# Patient Record
Sex: FEMALE | Race: BLACK OR AFRICAN AMERICAN | Employment: OTHER | URBAN - METROPOLITAN AREA
[De-identification: names, ages, dates, MRNs, and addresses within clinical notes are randomized per-mention and may not be internally consistent; named-entity substitution may affect disease eponyms.]

---

## 2018-12-20 ENCOUNTER — ANESTHESIA EVENT (OUTPATIENT)
Dept: SURGERY | Age: 53
DRG: 253 | End: 2018-12-20
Payer: MEDICARE

## 2018-12-20 ENCOUNTER — HOSPITAL ENCOUNTER (OUTPATIENT)
Dept: PREADMISSION TESTING | Age: 53
Discharge: HOME | DRG: 253 | End: 2018-12-20
Payer: MEDICARE

## 2018-12-20 ENCOUNTER — HOSPITAL ENCOUNTER (INPATIENT)
Age: 53
LOS: 12 days | Discharge: HOME HEALTH | DRG: 253 | End: 2019-01-01
Attending: EMERGENCY MEDICINE | Admitting: FAMILY MEDICINE
Payer: MEDICARE

## 2018-12-20 DIAGNOSIS — Z01.818 PREOP TESTING: ICD-10-CM

## 2018-12-20 DIAGNOSIS — T82.898A OCCLUSION OF FEMOROPOPLITEAL BYPASS GRAFT, INITIAL ENCOUNTER (HCC): Primary | ICD-10-CM

## 2018-12-20 PROBLEM — F32.A DEPRESSION: Chronic | Status: ACTIVE | Noted: 2018-12-20

## 2018-12-20 PROBLEM — I25.10 CAD (CORONARY ARTERY DISEASE): Chronic | Status: ACTIVE | Noted: 2018-12-20

## 2018-12-20 PROBLEM — I70.219 ATHEROSCLEROTIC PVD WITH INTERMITTENT CLAUDICATION (HCC): Status: ACTIVE | Noted: 2018-12-20

## 2018-12-20 PROBLEM — F17.200 CURRENT SMOKER: Status: ACTIVE | Noted: 2018-12-20

## 2018-12-20 PROBLEM — E11.9 DM (DIABETES MELLITUS) (HCC): Chronic | Status: ACTIVE | Noted: 2018-12-20

## 2018-12-20 PROBLEM — E87.6 HYPOKALEMIA: Status: ACTIVE | Noted: 2018-12-20

## 2018-12-20 PROBLEM — I10 HTN (HYPERTENSION): Chronic | Status: ACTIVE | Noted: 2018-12-20

## 2018-12-20 LAB
ALBUMIN SERPL-MCNC: 3.4 G/DL (ref 3.4–5)
ALBUMIN/GLOB SERPL: 0.7 {RATIO} (ref 0.8–1.7)
ALP SERPL-CCNC: 66 U/L (ref 45–117)
ALT SERPL-CCNC: 26 U/L (ref 13–56)
ANION GAP SERPL CALC-SCNC: 5 MMOL/L (ref 3–18)
ANION GAP SERPL CALC-SCNC: 8 MMOL/L (ref 3–18)
APTT PPP: 57.3 SEC (ref 23–36.4)
AST SERPL-CCNC: 37 U/L (ref 15–37)
BASOPHILS # BLD: 0 K/UL (ref 0–0.1)
BASOPHILS NFR BLD: 0 % (ref 0–2)
BILIRUB SERPL-MCNC: 0.8 MG/DL (ref 0.2–1)
BUN SERPL-MCNC: 10 MG/DL (ref 7–18)
BUN SERPL-MCNC: 10 MG/DL (ref 7–18)
BUN/CREAT SERPL: 13 (ref 12–20)
BUN/CREAT SERPL: 14 (ref 12–20)
CALCIUM SERPL-MCNC: 8.3 MG/DL (ref 8.5–10.1)
CALCIUM SERPL-MCNC: 8.4 MG/DL (ref 8.5–10.1)
CHLORIDE SERPL-SCNC: 101 MMOL/L (ref 100–108)
CHLORIDE SERPL-SCNC: 99 MMOL/L (ref 100–108)
CO2 SERPL-SCNC: 28 MMOL/L (ref 21–32)
CO2 SERPL-SCNC: 30 MMOL/L (ref 21–32)
CREAT SERPL-MCNC: 0.73 MG/DL (ref 0.6–1.3)
CREAT SERPL-MCNC: 0.75 MG/DL (ref 0.6–1.3)
DIFFERENTIAL METHOD BLD: ABNORMAL
EOSINOPHIL # BLD: 0.1 K/UL (ref 0–0.4)
EOSINOPHIL NFR BLD: 2 % (ref 0–5)
ERYTHROCYTE [DISTWIDTH] IN BLOOD BY AUTOMATED COUNT: 14.5 % (ref 11.6–14.5)
GLOBULIN SER CALC-MCNC: 5 G/DL (ref 2–4)
GLUCOSE BLD STRIP.AUTO-MCNC: 159 MG/DL (ref 70–110)
GLUCOSE SERPL-MCNC: 87 MG/DL (ref 74–99)
GLUCOSE SERPL-MCNC: 91 MG/DL (ref 74–99)
HCT VFR BLD AUTO: 37.9 % (ref 35–45)
HCT VFR BLD AUTO: 38.1 % (ref 35–45)
HGB BLD-MCNC: 11.7 G/DL (ref 12–16)
HGB BLD-MCNC: 11.7 G/DL (ref 12–16)
INR PPP: 1 (ref 0.8–1.2)
LYMPHOCYTES # BLD: 1.5 K/UL (ref 0.9–3.6)
LYMPHOCYTES NFR BLD: 24 % (ref 21–52)
MCH RBC QN AUTO: 21.4 PG (ref 24–34)
MCHC RBC AUTO-ENTMCNC: 30.9 G/DL (ref 31–37)
MCV RBC AUTO: 69.3 FL (ref 74–97)
MONOCYTES # BLD: 0.5 K/UL (ref 0.05–1.2)
MONOCYTES NFR BLD: 9 % (ref 3–10)
NEUTS SEG # BLD: 4 K/UL (ref 1.8–8)
NEUTS SEG NFR BLD: 65 % (ref 40–73)
PLATELET # BLD AUTO: 198 K/UL (ref 135–420)
PMV BLD AUTO: 11.2 FL (ref 9.2–11.8)
POTASSIUM SERPL-SCNC: 3.2 MMOL/L (ref 3.5–5.5)
POTASSIUM SERPL-SCNC: 3.4 MMOL/L (ref 3.5–5.5)
PROT SERPL-MCNC: 8.4 G/DL (ref 6.4–8.2)
PROTHROMBIN TIME: 13.2 SEC (ref 11.5–15.2)
RBC # BLD AUTO: 5.47 M/UL (ref 4.2–5.3)
SODIUM SERPL-SCNC: 135 MMOL/L (ref 136–145)
SODIUM SERPL-SCNC: 136 MMOL/L (ref 136–145)
WBC # BLD AUTO: 6.1 K/UL (ref 4.6–13.2)

## 2018-12-20 PROCEDURE — 85014 HEMATOCRIT: CPT

## 2018-12-20 PROCEDURE — 86923 COMPATIBILITY TEST ELECTRIC: CPT

## 2018-12-20 PROCEDURE — 74011250636 HC RX REV CODE- 250/636: Performed by: EMERGENCY MEDICINE

## 2018-12-20 PROCEDURE — 96365 THER/PROPH/DIAG IV INF INIT: CPT

## 2018-12-20 PROCEDURE — 74011250637 HC RX REV CODE- 250/637: Performed by: EMERGENCY MEDICINE

## 2018-12-20 PROCEDURE — 74011250637 HC RX REV CODE- 250/637: Performed by: FAMILY MEDICINE

## 2018-12-20 PROCEDURE — 65270000029 HC RM PRIVATE

## 2018-12-20 PROCEDURE — 74011250636 HC RX REV CODE- 250/636: Performed by: FAMILY MEDICINE

## 2018-12-20 PROCEDURE — 80053 COMPREHEN METABOLIC PANEL: CPT

## 2018-12-20 PROCEDURE — 36415 COLL VENOUS BLD VENIPUNCTURE: CPT

## 2018-12-20 PROCEDURE — 96375 TX/PRO/DX INJ NEW DRUG ADDON: CPT

## 2018-12-20 PROCEDURE — 74011636637 HC RX REV CODE- 636/637: Performed by: NURSE ANESTHETIST, CERTIFIED REGISTERED

## 2018-12-20 PROCEDURE — 82962 GLUCOSE BLOOD TEST: CPT

## 2018-12-20 PROCEDURE — 74011250637 HC RX REV CODE- 250/637: Performed by: NURSE ANESTHETIST, CERTIFIED REGISTERED

## 2018-12-20 PROCEDURE — 85025 COMPLETE CBC W/AUTO DIFF WBC: CPT

## 2018-12-20 PROCEDURE — 86901 BLOOD TYPING SEROLOGIC RH(D): CPT

## 2018-12-20 PROCEDURE — 85610 PROTHROMBIN TIME: CPT

## 2018-12-20 PROCEDURE — 99285 EMERGENCY DEPT VISIT HI MDM: CPT

## 2018-12-20 PROCEDURE — 85730 THROMBOPLASTIN TIME PARTIAL: CPT

## 2018-12-20 PROCEDURE — 77030021352 HC CBL LD SYS DISP COVD -B

## 2018-12-20 RX ORDER — HEPARIN SODIUM 10000 [USP'U]/100ML
18-36 INJECTION, SOLUTION INTRAVENOUS
Status: DISCONTINUED | OUTPATIENT
Start: 2018-12-20 | End: 2018-12-21

## 2018-12-20 RX ORDER — NITROGLYCERIN 0.4 MG/1
0.4 TABLET SUBLINGUAL
COMMUNITY
Start: 2016-12-09 | End: 2019-09-24 | Stop reason: SDUPTHER

## 2018-12-20 RX ORDER — HEPARIN SODIUM 1000 [USP'U]/ML
40 INJECTION, SOLUTION INTRAVENOUS; SUBCUTANEOUS ONCE
Status: COMPLETED | OUTPATIENT
Start: 2018-12-20 | End: 2018-12-20

## 2018-12-20 RX ORDER — SODIUM CHLORIDE, SODIUM LACTATE, POTASSIUM CHLORIDE, CALCIUM CHLORIDE 600; 310; 30; 20 MG/100ML; MG/100ML; MG/100ML; MG/100ML
50 INJECTION, SOLUTION INTRAVENOUS CONTINUOUS
Status: DISCONTINUED | OUTPATIENT
Start: 2018-12-20 | End: 2018-12-21 | Stop reason: HOSPADM

## 2018-12-20 RX ORDER — NALOXONE HYDROCHLORIDE 0.4 MG/ML
0.4 INJECTION, SOLUTION INTRAMUSCULAR; INTRAVENOUS; SUBCUTANEOUS AS NEEDED
Status: DISCONTINUED | OUTPATIENT
Start: 2018-12-20 | End: 2019-01-01 | Stop reason: HOSPADM

## 2018-12-20 RX ORDER — ALBUTEROL SULFATE 90 UG/1
2 AEROSOL, METERED RESPIRATORY (INHALATION)
Status: DISCONTINUED | OUTPATIENT
Start: 2018-12-20 | End: 2018-12-20 | Stop reason: CLARIF

## 2018-12-20 RX ORDER — CLONAZEPAM 0.5 MG/1
1 TABLET ORAL 2 TIMES DAILY
Status: DISCONTINUED | OUTPATIENT
Start: 2018-12-20 | End: 2018-12-26

## 2018-12-20 RX ORDER — DEXTROSE MONOHYDRATE 25 G/50ML
25-50 INJECTION, SOLUTION INTRAVENOUS AS NEEDED
Status: DISCONTINUED | OUTPATIENT
Start: 2018-12-20 | End: 2019-01-01 | Stop reason: HOSPADM

## 2018-12-20 RX ORDER — AMLODIPINE BESYLATE 2.5 MG/1
2.5 TABLET ORAL DAILY
COMMUNITY
Start: 2017-04-22

## 2018-12-20 RX ORDER — ATORVASTATIN CALCIUM 20 MG/1
80 TABLET, FILM COATED ORAL DAILY
COMMUNITY
End: 2019-08-06

## 2018-12-20 RX ORDER — LOSARTAN POTASSIUM 50 MG/1
50 TABLET ORAL DAILY
Status: DISCONTINUED | OUTPATIENT
Start: 2018-12-21 | End: 2018-12-25

## 2018-12-20 RX ORDER — SODIUM CHLORIDE AND POTASSIUM CHLORIDE .9; .15 G/100ML; G/100ML
SOLUTION INTRAVENOUS CONTINUOUS
Status: DISCONTINUED | OUTPATIENT
Start: 2018-12-20 | End: 2018-12-22

## 2018-12-20 RX ORDER — ACETAMINOPHEN 500 MG
1000 TABLET ORAL ONCE
Status: COMPLETED | OUTPATIENT
Start: 2018-12-20 | End: 2018-12-20

## 2018-12-20 RX ORDER — LINAGLIPTIN 5 MG/1
5 TABLET, FILM COATED ORAL DAILY
COMMUNITY
Start: 2017-03-08

## 2018-12-20 RX ORDER — POTASSIUM CHLORIDE 20 MEQ/1
40 TABLET, EXTENDED RELEASE ORAL DAILY
Status: DISCONTINUED | OUTPATIENT
Start: 2018-12-20 | End: 2019-01-01 | Stop reason: HOSPADM

## 2018-12-20 RX ORDER — GUAIFENESIN 100 MG/5ML
81 LIQUID (ML) ORAL DAILY
Status: DISCONTINUED | OUTPATIENT
Start: 2018-12-21 | End: 2018-12-22

## 2018-12-20 RX ORDER — MORPHINE SULFATE 4 MG/ML
4 INJECTION INTRAVENOUS
Status: DISCONTINUED | OUTPATIENT
Start: 2018-12-20 | End: 2018-12-21 | Stop reason: CLARIF

## 2018-12-20 RX ORDER — GABAPENTIN 300 MG/1
300 CAPSULE ORAL 3 TIMES DAILY
Status: DISCONTINUED | OUTPATIENT
Start: 2018-12-20 | End: 2018-12-26

## 2018-12-20 RX ORDER — CLOPIDOGREL BISULFATE 75 MG/1
75 TABLET ORAL DAILY
Status: DISCONTINUED | OUTPATIENT
Start: 2018-12-21 | End: 2019-01-01 | Stop reason: HOSPADM

## 2018-12-20 RX ORDER — MORPHINE SULFATE 10 MG/ML
4 INJECTION, SOLUTION INTRAMUSCULAR; INTRAVENOUS
Status: DISCONTINUED | OUTPATIENT
Start: 2018-12-20 | End: 2018-12-20

## 2018-12-20 RX ORDER — CLONAZEPAM 1 MG/1
1 TABLET ORAL 2 TIMES DAILY
COMMUNITY
End: 2020-06-24 | Stop reason: ALTCHOICE

## 2018-12-20 RX ORDER — AMLODIPINE BESYLATE 5 MG/1
2.5 TABLET ORAL DAILY
Status: DISCONTINUED | OUTPATIENT
Start: 2018-12-21 | End: 2018-12-26

## 2018-12-20 RX ORDER — ALBUTEROL SULFATE 0.83 MG/ML
2.5 SOLUTION RESPIRATORY (INHALATION)
Status: DISCONTINUED | OUTPATIENT
Start: 2018-12-20 | End: 2019-01-01 | Stop reason: HOSPADM

## 2018-12-20 RX ORDER — HEPARIN SODIUM 1000 [USP'U]/ML
80 INJECTION, SOLUTION INTRAVENOUS; SUBCUTANEOUS ONCE
Status: COMPLETED | OUTPATIENT
Start: 2018-12-20 | End: 2018-12-20

## 2018-12-20 RX ORDER — DEXTROSE 50 % IN WATER (D50W) INTRAVENOUS SYRINGE
25-50 AS NEEDED
Status: DISCONTINUED | OUTPATIENT
Start: 2018-12-20 | End: 2018-12-20

## 2018-12-20 RX ORDER — GUAIFENESIN 100 MG/5ML
81 LIQUID (ML) ORAL DAILY
COMMUNITY
Start: 2016-07-02 | End: 2021-07-29

## 2018-12-20 RX ORDER — CEFAZOLIN SODIUM 2 G/50ML
2 SOLUTION INTRAVENOUS
Status: COMPLETED | OUTPATIENT
Start: 2018-12-21 | End: 2018-12-21

## 2018-12-20 RX ORDER — INSULIN LISPRO 100 [IU]/ML
INJECTION, SOLUTION INTRAVENOUS; SUBCUTANEOUS ONCE
Status: COMPLETED | OUTPATIENT
Start: 2018-12-20 | End: 2018-12-20

## 2018-12-20 RX ORDER — INSULIN LISPRO 100 [IU]/ML
INJECTION, SOLUTION INTRAVENOUS; SUBCUTANEOUS EVERY 6 HOURS
Status: DISCONTINUED | OUTPATIENT
Start: 2018-12-20 | End: 2018-12-23

## 2018-12-20 RX ORDER — ATORVASTATIN CALCIUM 20 MG/1
80 TABLET, FILM COATED ORAL DAILY
Status: DISCONTINUED | OUTPATIENT
Start: 2018-12-21 | End: 2019-01-01 | Stop reason: HOSPADM

## 2018-12-20 RX ORDER — LOSARTAN POTASSIUM 50 MG/1
TABLET ORAL DAILY
COMMUNITY
End: 2019-09-24 | Stop reason: SDUPTHER

## 2018-12-20 RX ORDER — QUETIAPINE 300 MG/1
300 TABLET, FILM COATED, EXTENDED RELEASE ORAL
Status: DISCONTINUED | OUTPATIENT
Start: 2018-12-20 | End: 2019-01-01 | Stop reason: HOSPADM

## 2018-12-20 RX ORDER — FAMOTIDINE 20 MG/1
20 TABLET, FILM COATED ORAL ONCE
Status: COMPLETED | OUTPATIENT
Start: 2018-12-20 | End: 2018-12-20

## 2018-12-20 RX ORDER — CLOPIDOGREL BISULFATE 75 MG/1
75 TABLET ORAL
COMMUNITY
Start: 2016-12-09 | End: 2019-01-01

## 2018-12-20 RX ORDER — KETOROLAC TROMETHAMINE 30 MG/ML
30 INJECTION, SOLUTION INTRAMUSCULAR; INTRAVENOUS ONCE
Status: COMPLETED | OUTPATIENT
Start: 2018-12-20 | End: 2018-12-20

## 2018-12-20 RX ORDER — GABAPENTIN 300 MG/1
300 CAPSULE ORAL 3 TIMES DAILY
COMMUNITY
End: 2020-06-24 | Stop reason: ALTCHOICE

## 2018-12-20 RX ORDER — OXYCODONE AND ACETAMINOPHEN 5; 325 MG/1; MG/1
1-2 TABLET ORAL
Status: DISCONTINUED | OUTPATIENT
Start: 2018-12-20 | End: 2019-01-01 | Stop reason: HOSPADM

## 2018-12-20 RX ORDER — MORPHINE SULFATE 2 MG/ML
4 INJECTION, SOLUTION INTRAMUSCULAR; INTRAVENOUS
Status: COMPLETED | OUTPATIENT
Start: 2018-12-20 | End: 2018-12-20

## 2018-12-20 RX ORDER — MAGNESIUM SULFATE 100 %
4 CRYSTALS MISCELLANEOUS AS NEEDED
Status: DISCONTINUED | OUTPATIENT
Start: 2018-12-20 | End: 2019-01-01 | Stop reason: HOSPADM

## 2018-12-20 RX ADMIN — ACETAMINOPHEN 1000 MG: 500 TABLET, FILM COATED ORAL at 16:39

## 2018-12-20 RX ADMIN — POTASSIUM CHLORIDE 40 MEQ: 20 TABLET, EXTENDED RELEASE ORAL at 18:01

## 2018-12-20 RX ADMIN — CLONAZEPAM 1 MG: 0.5 TABLET ORAL at 20:59

## 2018-12-20 RX ADMIN — QUETIAPINE FUMARATE 300 MG: 300 TABLET, EXTENDED RELEASE ORAL at 23:02

## 2018-12-20 RX ADMIN — GABAPENTIN 300 MG: 300 CAPSULE ORAL at 23:02

## 2018-12-20 RX ADMIN — INSULIN LISPRO 3 UNITS: 100 INJECTION, SOLUTION INTRAVENOUS; SUBCUTANEOUS at 18:35

## 2018-12-20 RX ADMIN — HEPARIN SODIUM 5980 UNITS: 1000 INJECTION INTRAVENOUS; SUBCUTANEOUS at 15:37

## 2018-12-20 RX ADMIN — SODIUM CHLORIDE AND POTASSIUM CHLORIDE: .9; .15 SOLUTION INTRAVENOUS at 18:01

## 2018-12-20 RX ADMIN — FAMOTIDINE 20 MG: 20 TABLET ORAL at 18:01

## 2018-12-20 RX ADMIN — HEPARIN SODIUM 2990 UNITS: 1000 INJECTION, SOLUTION INTRAVENOUS; SUBCUTANEOUS at 23:03

## 2018-12-20 RX ADMIN — KETOROLAC TROMETHAMINE 30 MG: 30 INJECTION, SOLUTION INTRAMUSCULAR at 16:39

## 2018-12-20 RX ADMIN — MORPHINE SULFATE 4 MG: 2 INJECTION, SOLUTION INTRAMUSCULAR; INTRAVENOUS at 16:39

## 2018-12-20 RX ADMIN — HEPARIN SODIUM AND DEXTROSE 18 UNITS/KG/HR: 10000; 5 INJECTION INTRAVENOUS at 15:38

## 2018-12-20 NOTE — H&P
History and Physical    Patient: Veronika Lance               Sex: female          DOA: 2018       YOB: 1965      Age:  48 y.o.        LOS:  LOS: 0 days        Chief Complaint   Patient presents with    Abnormal Lab Results         HPI:     Veronika Lance is a 48 y.o. female  With pmhx CAD, Cardiac stent x 2, DM, pvd, depression , hld , TIA who presents with leg pain onset 2 month ago. Patient has failed fempop. She was seen by vascular yesterday and had a balloon angioplasty yesterday. Patient was told to come to hospital to be admitted for femoral popliteal bypass scheduled for tomorrow. In the ER she was started on Heparin drip per vascular surgery recommendation. She also has associated numbness and says her leg feels cold and heavy. She is an active smoker. Claims her last cigarette was 2 days ago. Past Medical History:   Diagnosis Date    Arthritis     Asthma     Depression     Hypertension     Unspecified sleep apnea    . Past Surgical History:   Procedure Laterality Date    HX  SECTION      HX HYSTERECTOMY      HX ORTHOPAEDIC      \"plate in R arm\"    HX SHOULDER ARTHROSCOPY Left        No current facility-administered medications on file prior to encounter. Current Outpatient Medications on File Prior to Encounter   Medication Sig Dispense Refill    gabapentin (NEURONTIN) 300 mg capsule Take 300 mg by mouth three (3) times daily.  buPROPion (WELLBUTRIN) 100 mg tablet Take  by mouth.  losartan (COZAAR) 50 mg tablet Take  by mouth daily.  atorvastatin (LIPITOR) 20 mg tablet Take  by mouth daily.  aspirin 81 mg chewable tablet 81 mg.      clopidogrel (PLAVIX) 75 mg tab 75 mg.      linagliptin (TRADJENTA) 5 mg tablet 5 mg.  amLODIPine (NORVASC) 2.5 mg tablet 2.5 mg.      nitroglycerin (NITROSTAT) 0.4 mg SL tablet 0.4 mg.      clonazePAM (KLONOPIN) 1 mg tablet 1 mg.       vortioxetine (TRINTELLIX) 10 mg tablet 10 mg.     [facility] QUEtiapine SR (SEROQUEL XR) 300 mg sr tablet Take 300 mg by mouth nightly.  oxycodone-acetaminophen (PERCOCET) 5-325 mg per tablet Take 1-2 tablets by mouth every six (6) hours as needed for Pain. 25 tablet 0    zolpidem (AMBIEN) 10 mg tablet Take 10 mg by mouth nightly as needed.  albuterol (PROVENTIL HFA, VENTOLIN HFA) 90 mcg/actuation inhaler Take  by inhalation. Social History     Socioeconomic History    Marital status: LEGALLY      Spouse name: Not on file    Number of children: Not on file    Years of education: Not on file    Highest education level: Not on file   Social Needs    Financial resource strain: Not on file    Food insecurity - worry: Not on file    Food insecurity - inability: Not on file    Transportation needs - medical: Not on file   [place] needs - non-medical: Not on file   Occupational History    Not on file   Tobacco Use    Smoking status: Current Every Day Smoker     Packs/day: 0.25   Substance and Sexual Activity    Alcohol use: No    Drug use: No    Sexual activity: Not on file   Other Topics Concern    Not on file   Social History Narrative    Not on file         Prior to Admission Medications   Prescriptions Last Dose Informant Patient Reported? Taking? QUEtiapine SR (SEROQUEL XR) 300 mg sr tablet   Yes No   Sig: Take 300 mg by mouth nightly. albuterol (PROVENTIL HFA, VENTOLIN HFA) 90 mcg/actuation inhaler   Yes No   Sig: Take  by inhalation. hydrochlorothiazide (HYDRODIURIL) 25 mg tablet   Yes No   Sig: Take 25 mg by mouth daily. oxycodone-acetaminophen (PERCOCET) 5-325 mg per tablet   No No   Sig: Take 1-2 tablets by mouth every six (6) hours as needed for Pain. venlafaxine-SR (EFFEXOR XR) 37.5 mg capsule   Yes No   Sig: Take 37.5 mg by mouth daily. venlafaxine-SR (EFFEXOR XR) 75 mg capsule   Yes No   Sig: Take 150 mg by mouth daily. zolpidem (AMBIEN) 10 mg tablet   Yes No   Sig: Take 10 mg by mouth nightly as needed. Facility-Administered Medications: None       Family History   Problem Relation Age of Onset    Diabetes Mother     Hypertension Mother     Heart Disease Mother        No Known Allergies    Review of Systems  Review of Systems   Constitutional: Negative. HENT: Negative. Eyes: Negative. Respiratory: Negative. Cardiovascular: Negative. Endocrine: Negative. Genitourinary: Negative. Musculoskeletal:        Left Leg pain    Skin: Negative. Allergic/Immunologic: Negative. Neurological: Positive for numbness (left leg). Hematological: Negative. Psychiatric/Behavioral: Negative. Physical Exam:       Visit Vitals  /83 (BP 1 Location: Left arm, BP Patient Position: At rest)   Pulse 95   Temp 98 °F (36.7 °C)   Resp 16   Ht 5' 5\" (1.651 m)   Wt 74.8 kg (165 lb)   SpO2 96%   BMI 27.46 kg/m²         Physical Exam   Constitutional: She is oriented to person, place, and time. She appears well-developed and well-nourished. No distress. HENT:   Head: Normocephalic and atraumatic. Eyes: EOM are normal. Pupils are equal, round, and reactive to light. No scleral icterus. Neck: Neck supple. Cardiovascular: Normal rate, regular rhythm and normal heart sounds. No murmur heard. Pulmonary/Chest: Breath sounds normal. No respiratory distress. Abdominal: Soft. Bowel sounds are normal. She exhibits no distension. There is no tenderness. Musculoskeletal: She exhibits no edema. Cold , decreased pulse in LLE   Neurological: She is alert and oriented to person, place, and time. Skin: She is not diaphoretic. Relatively cool LLE   Psychiatric: She has a normal mood and affect. Ancillary Studies: All lab and imaging reviewed for the past 24 hours.         Assessment/Plan     Active Problems:    Atherosclerotic PVD with intermittent claudication ([facility] [address] (12/20/2018)      Hypokalemia (12/20/2018)      CAD (coronary artery disease) (12/20/2018)      DM (diabetes mellitus) ([facility] [address] (12/20/2018)      HTN (hypertension) (12/20/2018)      Depression (12/20/2018)      Current smoker (12/20/2018)      Neuropathy     CARE PLAN:    PVD with Claudication   - Continue Heparin drip   - pain control as needed   - Patient scheduled for femoral popliteal bypass tomorrow AM  - Vascular Surgery consulting     CAD  - Plavix  - Losartan   - aspirin   - Lipitor     DM  - SSI   - A1c    HTN  - losartan , Norvasc     Depression   - Wellbutrin , Seroquel, clonepin     Smoker   - advise cessation     Hypokalemia   - replace     DM Neuropathy  - Neurontin     DVT prophylaxis     Full code     Bahman Madrid MD MPH  12/20/2018  4:24 PM

## 2018-12-20 NOTE — ED NOTES
TRANSFER - ED to INPATIENT REPORT:    SBAR report made available to receiving floor on this patient being transferred to [facility] (2100)  for routine progression of care       Admitting diagnosis Atherosclerotic PVD with intermittent claudication ([address]    Information from the following report(s) SBAR, ED Summary, MAR, Recent Results and Med Rec Status was made available to receiving floor. Lines:   Peripheral IV 12/20/18 Right Antecubital (Active)   Site Assessment Clean, dry, & intact 12/20/2018  2:54 PM   Phlebitis Assessment 0 12/20/2018  2:54 PM   Infiltration Assessment 0 12/20/2018  2:54 PM   Dressing Status Clean, dry, & intact 12/20/2018  2:54 PM   Action Taken Blood drawn 12/20/2018  2:54 PM        Medication list confirmed with patient    Opportunity for questions and clarification was provided.       Patient is oriented to time, place, person and situation High alert med  Patient is  continent and ambulatory with assist     Valuables transported with patient     Patient transported with:   Registered Nurse      Vitals w/ MEWS Score (last day)     Date/Time MEWS Score Pulse Resp Temp BP Level of Consciousness SpO2    12/20/18 1452  1  95  16  98 °F (36.7 °C)  121/83  Alert  96 %

## 2018-12-20 NOTE — ED TRIAGE NOTES
Pt sent by Dr. Jim Armenta after failed fem-pop. LLE. Pt to be admitted.  Orders to start Heparin asap

## 2018-12-20 NOTE — ED PROVIDER NOTES
EMERGENCY DEPARTMENT HISTORY AND PHYSICAL EXAM    2:53 PM      Date: 12/20/2018  Patient Name: Axel Pyle    History of Presenting Illness     Chief Complaint   Patient presents with    Abnormal Lab Results         History Provided By: Patient      Additional History (Context): Axel Pyle is a 48 y.o. female with stent placed in the left leg by Dr. Jefferson Mariee (Cardiology) in April 2018 and MI who presents with worsening moderate left leg pain for 2 months. The patient states her pain returned to her left leg and her toes are cramping. The patient had a balloon angioplasty yesterday. The patient is to have a femoral popliteal bypass graft tomorrow by Dr. Gloria Jaimes (Vascular). Dr. Gloria Jaimes wants Heparin started. Currently on Plavix and Aspirin. Patient reports a cough. Denies fevers and chills. Former smoker of 4 days. Denies alcohol and drug use. PCP: Angelica Morris MD    Current Facility-Administered Medications   Medication Dose Route Frequency Provider Last Rate Last Dose    heparin 25,000 units in D5W 250 ml infusion  18-36 Units/kg/hr IntraVENous TITRATE Enrique Ramsey MD 13.5 mL/hr at 12/20/18 1538 18 Units/kg/hr at 12/20/18 1538     Current Outpatient Medications   Medication Sig Dispense Refill    gabapentin (NEURONTIN) 300 mg capsule Take 300 mg by mouth three (3) times daily.  buPROPion (WELLBUTRIN) 100 mg tablet Take  by mouth.  losartan (COZAAR) 50 mg tablet Take  by mouth daily.  atorvastatin (LIPITOR) 20 mg tablet Take  by mouth daily.  aspirin 81 mg chewable tablet 81 mg.      clopidogrel (PLAVIX) 75 mg tab 75 mg.      linagliptin (TRADJENTA) 5 mg tablet 5 mg.  amLODIPine (NORVASC) 2.5 mg tablet 2.5 mg.      nitroglycerin (NITROSTAT) 0.4 mg SL tablet 0.4 mg.      clonazePAM (KLONOPIN) 1 mg tablet 1 mg.  vortioxetine (TRINTELLIX) 10 mg tablet 10 mg.      QUEtiapine SR (SEROQUEL XR) 300 mg sr tablet Take 300 mg by mouth nightly.       oxycodone-acetaminophen (PERCOCET) 5-325 mg per tablet Take 1-2 tablets by mouth every six (6) hours as needed for Pain. 25 tablet 0    zolpidem (AMBIEN) 10 mg tablet Take 10 mg by mouth nightly as needed.  albuterol (PROVENTIL HFA, VENTOLIN HFA) 90 mcg/actuation inhaler Take  by inhalation. Past History     Past Medical History:  Past Medical History:   Diagnosis Date    Arthritis     Asthma     Depression     Hypertension     Unspecified sleep apnea        Past Surgical History:  Past Surgical History:   Procedure Laterality Date    HX  SECTION      HX HYSTERECTOMY      HX ORTHOPAEDIC      \"plate in R arm\"    HX SHOULDER ARTHROSCOPY Left        Family History:  Family History   Problem Relation Age of Onset    Diabetes Mother     Hypertension Mother     Heart Disease Mother        Social History:  Social History     Tobacco Use    Smoking status: Current Every Day Smoker     Packs/day: 0.25   Substance Use Topics    Alcohol use: No    Drug use: No       Allergies:  No Known Allergies      Review of Systems       Review of Systems   Constitutional: Negative for chills and fever. Respiratory: Positive for cough. Musculoskeletal:        Positive for left leg pain. All other systems reviewed and are negative. Physical Exam     Visit Vitals  /88   Pulse 94   Temp 98 °F (36.7 °C)   Resp 24   Ht 5' 5\" (1.651 m)   Wt 74.8 kg (165 lb)   SpO2 94%   BMI 27.46 kg/m²         Physical Exam   Constitutional: She is oriented to person, place, and time. She appears well-developed and well-nourished. HENT:   Head: Normocephalic and atraumatic. Eyes: EOM are normal. Pupils are equal, round, and reactive to light. Right eye exhibits no discharge. Left eye exhibits no discharge. Neck: Normal range of motion. Neck supple. No JVD present. No tracheal deviation present. Cardiovascular: Normal rate, regular rhythm and normal heart sounds. No murmur heard.   Pulses: Radial pulses are 2+ on the right side, and 2+ on the left side. Unable to palpate left DP and PT pulse. Pulmonary/Chest: Effort normal and breath sounds normal. No respiratory distress. She has no rales. Scattered wheezes. Abdominal: Soft. Bowel sounds are normal. She exhibits no distension. There is no tenderness. There is no rebound. Abdomen soft in all four quadrants. Musculoskeletal: Normal range of motion. She exhibits no tenderness or deformity. Left foot warm and well perfused. Pain in all direction of foot and knee. Neurological: She is alert and oriented to person, place, and time. No cranial nerve deficit. 5/5 strength UE/LE, 5/5 sensation UE/LE     Skin: Skin is warm and dry. No rash noted. No erythema. Capillary refill less than 3 seconds. No skin color changes. Psychiatric: She has a normal mood and affect.  Her behavior is normal.         Diagnostic Study Results     Labs -  Recent Results (from the past 12 hour(s))   METABOLIC PANEL, BASIC    Collection Time: 12/20/18  1:39 PM   Result Value Ref Range    Sodium 135 (L) 136 - 145 mmol/L    Potassium 3.4 (L) 3.5 - 5.5 mmol/L    Chloride 99 (L) 100 - 108 mmol/L    CO2 28 21 - 32 mmol/L    Anion gap 8 3.0 - 18 mmol/L    Glucose 87 74 - 99 mg/dL    BUN 10 7.0 - 18 MG/DL    Creatinine 0.73 0.6 - 1.3 MG/DL    BUN/Creatinine ratio 14 12 - 20      GFR est AA >60 >60 ml/min/1.73m2    GFR est non-AA >60 >60 ml/min/1.73m2    Calcium 8.4 (L) 8.5 - 10.1 MG/DL   HGB & HCT    Collection Time: 12/20/18  1:39 PM   Result Value Ref Range    HGB 11.7 (L) 12.0 - 16.0 g/dL    HCT 38.1 35.0 - 45.0 %   TYPE & SCREEN    Collection Time: 12/20/18  1:40 PM   Result Value Ref Range    Crossmatch Expiration 12/24/2018     ABO/Rh(D) B POSITIVE     Antibody screen NEG    CBC WITH AUTOMATED DIFF    Collection Time: 12/20/18  2:56 PM   Result Value Ref Range    WBC 6.1 4.6 - 13.2 K/uL    RBC 5.47 (H) 4.20 - 5.30 M/uL    HGB 11.7 (L) 12.0 - 16.0 g/dL    HCT 37.9 35.0 - 45.0 %    MCV 69.3 (L) 74.0 - 97.0 FL    MCH 21.4 (L) 24.0 - 34.0 PG    MCHC 30.9 (L) 31.0 - 37.0 g/dL    RDW 14.5 11.6 - 14.5 %    PLATELET 848 187 - 758 K/uL    MPV 11.2 9.2 - 11.8 FL    NEUTROPHILS 65 40 - 73 %    LYMPHOCYTES 24 21 - 52 %    MONOCYTES 9 3 - 10 %    EOSINOPHILS 2 0 - 5 %    BASOPHILS 0 0 - 2 %    ABS. NEUTROPHILS 4.0 1.8 - 8.0 K/UL    ABS. LYMPHOCYTES 1.5 0.9 - 3.6 K/UL    ABS. MONOCYTES 0.5 0.05 - 1.2 K/UL    ABS. EOSINOPHILS 0.1 0.0 - 0.4 K/UL    ABS. BASOPHILS 0.0 0.0 - 0.1 K/UL    DF AUTOMATED     METABOLIC PANEL, COMPREHENSIVE    Collection Time: 12/20/18  2:56 PM   Result Value Ref Range    Sodium 136 136 - 145 mmol/L    Potassium 3.2 (L) 3.5 - 5.5 mmol/L    Chloride 101 100 - 108 mmol/L    CO2 30 21 - 32 mmol/L    Anion gap 5 3.0 - 18 mmol/L    Glucose 91 74 - 99 mg/dL    BUN 10 7.0 - 18 MG/DL    Creatinine 0.75 0.6 - 1.3 MG/DL    BUN/Creatinine ratio 13 12 - 20      GFR est AA >60 >60 ml/min/1.73m2    GFR est non-AA >60 >60 ml/min/1.73m2    Calcium 8.3 (L) 8.5 - 10.1 MG/DL    Bilirubin, total 0.8 0.2 - 1.0 MG/DL    ALT (SGPT) 26 13 - 56 U/L    AST (SGOT) 37 15 - 37 U/L    Alk. phosphatase 66 45 - 117 U/L    Protein, total 8.4 (H) 6.4 - 8.2 g/dL    Albumin 3.4 3.4 - 5.0 g/dL    Globulin 5.0 (H) 2.0 - 4.0 g/dL    A-G Ratio 0.7 (L) 0.8 - 1.7     PROTHROMBIN TIME + INR    Collection Time: 12/20/18  2:56 PM   Result Value Ref Range    Prothrombin time 13.2 11.5 - 15.2 sec    INR 1.0 0.8 - 1.2         Radiologic Studies -   No orders to display         Medical Decision Making   I am the first provider for this patient. I reviewed the vital signs, available nursing notes, past medical history, past surgical history, family history and social history. Vital Signs-Reviewed the patient's vital signs.       Records Reviewed: Nursing Notes and Old Medical Records      Provider Notes (Medical Decision Making):     MDM  Number of Diagnoses or Management Options  Occlusion of femoropopliteal bypass graft, initial encounter [facility]):   Diagnosis management comments: Diff dx: fem/pop occlusion, dvt, thrombosis    Pt has known PVD, had fem pop stent placed 6mo ago, worsening claudication even at rest now, sent in by jose for admission/heparin/OR. Pt in mild discomfort, unable to obtain pulses but foot warm, no necrosis. Heparin started, basic labs sent, will admit to hospitalist    Stanford Whitlock MD            For Hospitalized Patients:    Hospitalization Decision Time:  The decision to hospitalize the patient was made by Darwin Ruggiero MD at 5:05 PM on 12/20/2018    Diagnosis     Clinical Impression:   1. Occlusion of femoropopliteal bypass graft, initial encounter ([facility] [address]        Disposition: admit    Follow-up Information    None             Medication List      CONTINUE taking these medications    albuterol 90 mcg/actuation inhaler  Commonly known as:  PROVENTIL HFA, VENTOLIN HFA, PROAIR HFA     AMBIEN 10 mg tablet  Generic drug:  zolpidem     oxyCODONE-acetaminophen 5-325 mg per tablet  Commonly known as:  PERCOCET  Take 1-2 tablets by mouth every six (6) hours as needed for Pain.      SEROquel  mg sr tablet  Generic drug:  QUEtiapine SR        ASK your doctor about these medications    amLODIPine 2.5 mg tablet  Commonly known as:  NORVASC     aspirin 81 mg chewable tablet     clonazePAM 1 mg tablet  Commonly known as:  KlonoPIN     clopidogrel 75 mg Tab  Commonly known as:  PLAVIX     LIPITOR 20 mg tablet  Generic drug:  atorvastatin     losartan 50 mg tablet  Commonly known as:  COZAAR     NEURONTIN 300 mg capsule  Generic drug:  gabapentin     nitroglycerin 0.4 mg SL tablet  Commonly known as:  NITROSTAT     TRADJENTA 5 mg tablet  Generic drug:  linagliptin     vortioxetine 10 mg tablet  Commonly known as:  TRINTELLIX     WELLBUTRIN 100 mg tablet  Generic drug:  buPROPion          _______________________________    Attestations:  Zain[place] [id number] Gallo Crawford acting as a scribe for and in the presence of Margie Bass MD      December 20, 2018 at 5:36 PM       Provider Attestation:      I personally performed the services described in the documentation, reviewed the documentation, as recorded by the scribe in my presence, and it accurately and completely records my words and actions.  December 20, 2018 at 5:36 PM - Margie Bass MD    _______________________________

## 2018-12-20 NOTE — PROGRESS NOTES
1830 pt arrived to floor, oriented x4, settled in room by Daniella Banuelos RN. Heparin gtt verified and running at 13.5ml/hr, no baseline aptt drawn in ED, blood sugar taken (159) and insulin given, tele applied (box 58, NSR). Call bell in reach, mother at bedside. 1930 Bedside and Verbal shift change report given to cam (oncoming nurse) by Loc Goldman RN   (offgoing nurse). Report included the following information Kardex, MAR and Recent Results.

## 2018-12-20 NOTE — ANESTHESIA PREPROCEDURE EVALUATION
Anesthetic History   No history of anesthetic complications            Review of Systems / Medical History  Patient summary reviewed and pertinent labs reviewed    Pulmonary        Sleep apnea: No treatment    Asthma : well controlled       Neuro/Psych         Psychiatric history     Cardiovascular    Hypertension          CAD, PAD and cardiac stents         GI/Hepatic/Renal  Within defined limits              Endo/Other    Diabetes: type 2    Arthritis     Other Findings              Physical Exam    Airway  Mallampati: II  TM Distance: 4 - 6 cm  Neck ROM: normal range of motion   Mouth opening: Normal     Cardiovascular  Regular rate and rhythm,  S1 and S2 normal,  no murmur, click, rub, or gallop             Dental  No notable dental hx       Pulmonary  Breath sounds clear to auscultation               Abdominal  Abdominal exam normal       Other Findings            Anesthetic Plan    ASA: 3  Anesthesia type: general    Monitoring Plan: Arterial line      Induction: Intravenous  Anesthetic plan and risks discussed with: Patient

## 2018-12-21 ENCOUNTER — ANESTHESIA (OUTPATIENT)
Dept: SURGERY | Age: 53
DRG: 253 | End: 2018-12-21
Payer: MEDICARE

## 2018-12-21 LAB
ACT BLD: 186 SECS (ref 79–138)
ACT BLD: 202 SECS (ref 79–138)
ACT BLD: 213 SECS (ref 79–138)
ANION GAP SERPL CALC-SCNC: 8 MMOL/L (ref 3–18)
APTT PPP: 106.6 SEC (ref 23–36.4)
APTT PPP: 25.2 SEC (ref 23–36.4)
ATRIAL RATE: 65 BPM
BASOPHILS # BLD: 0 K/UL (ref 0–0.1)
BASOPHILS # BLD: 0 K/UL (ref 0–0.1)
BASOPHILS NFR BLD: 0 % (ref 0–2)
BASOPHILS NFR BLD: 1 % (ref 0–2)
BUN BLD-MCNC: 10 MG/DL (ref 7–18)
BUN SERPL-MCNC: 19 MG/DL (ref 7–18)
BUN/CREAT SERPL: 26 (ref 12–20)
CALCIUM SERPL-MCNC: 8 MG/DL (ref 8.5–10.1)
CALCULATED P AXIS, ECG09: 65 DEGREES
CALCULATED R AXIS, ECG10: 36 DEGREES
CALCULATED T AXIS, ECG11: -16 DEGREES
CHLORIDE BLD-SCNC: 103 MMOL/L (ref 100–108)
CHLORIDE SERPL-SCNC: 107 MMOL/L (ref 100–108)
CO2 SERPL-SCNC: 25 MMOL/L (ref 21–32)
CREAT SERPL-MCNC: 0.72 MG/DL (ref 0.6–1.3)
DIAGNOSIS, 93000: NORMAL
DIFFERENTIAL METHOD BLD: ABNORMAL
DIFFERENTIAL METHOD BLD: ABNORMAL
EOSINOPHIL # BLD: 0 K/UL (ref 0–0.4)
EOSINOPHIL # BLD: 0.2 K/UL (ref 0–0.4)
EOSINOPHIL NFR BLD: 0 % (ref 0–5)
EOSINOPHIL NFR BLD: 5 % (ref 0–5)
ERYTHROCYTE [DISTWIDTH] IN BLOOD BY AUTOMATED COUNT: 14.6 % (ref 11.6–14.5)
ERYTHROCYTE [DISTWIDTH] IN BLOOD BY AUTOMATED COUNT: 14.6 % (ref 11.6–14.5)
GLUCOSE BLD STRIP.AUTO-MCNC: 107 MG/DL (ref 70–110)
GLUCOSE BLD STRIP.AUTO-MCNC: 123 MG/DL (ref 70–110)
GLUCOSE BLD STRIP.AUTO-MCNC: 151 MG/DL (ref 70–110)
GLUCOSE BLD STRIP.AUTO-MCNC: 166 MG/DL (ref 70–110)
GLUCOSE BLD STRIP.AUTO-MCNC: 171 MG/DL (ref 74–106)
GLUCOSE BLD STRIP.AUTO-MCNC: 92 MG/DL (ref 70–110)
GLUCOSE SERPL-MCNC: 100 MG/DL (ref 74–99)
HCT VFR BLD AUTO: 28.4 % (ref 35–45)
HCT VFR BLD AUTO: 33.5 % (ref 35–45)
HCT VFR BLD AUTO: 34.6 % (ref 35–45)
HCT VFR BLD CALC: 28 % (ref 36–49)
HGB BLD-MCNC: 10.1 G/DL (ref 12–16)
HGB BLD-MCNC: 10.7 G/DL (ref 12–16)
HGB BLD-MCNC: 8.7 G/DL (ref 12–16)
HGB BLD-MCNC: 9.5 G/DL (ref 12–16)
INR PPP: 1.1 (ref 0.8–1.2)
LYMPHOCYTES # BLD: 1.1 K/UL (ref 0.9–3.6)
LYMPHOCYTES # BLD: 1.4 K/UL (ref 0.9–3.6)
LYMPHOCYTES NFR BLD: 17 % (ref 21–52)
LYMPHOCYTES NFR BLD: 39 % (ref 21–52)
MCH RBC QN AUTO: 20.8 PG (ref 24–34)
MCH RBC QN AUTO: 20.9 PG (ref 24–34)
MCHC RBC AUTO-ENTMCNC: 30.1 G/DL (ref 31–37)
MCHC RBC AUTO-ENTMCNC: 30.6 G/DL (ref 31–37)
MCV RBC AUTO: 67.9 FL (ref 74–97)
MCV RBC AUTO: 69.4 FL (ref 74–97)
MONOCYTES # BLD: 0.4 K/UL (ref 0.05–1.2)
MONOCYTES # BLD: 0.6 K/UL (ref 0.05–1.2)
MONOCYTES NFR BLD: 10 % (ref 3–10)
MONOCYTES NFR BLD: 9 % (ref 3–10)
NEUTS SEG # BLD: 1.6 K/UL (ref 1.8–8)
NEUTS SEG # BLD: 5 K/UL (ref 1.8–8)
NEUTS SEG NFR BLD: 45 % (ref 40–73)
NEUTS SEG NFR BLD: 74 % (ref 40–73)
P-R INTERVAL, ECG05: 138 MS
PLATELET # BLD AUTO: 166 K/UL (ref 135–420)
PLATELET # BLD AUTO: 201 K/UL (ref 135–420)
PMV BLD AUTO: 10.7 FL (ref 9.2–11.8)
POTASSIUM BLD-SCNC: 3.5 MMOL/L (ref 3.5–5.5)
POTASSIUM SERPL-SCNC: 3.6 MMOL/L (ref 3.5–5.5)
PROTHROMBIN TIME: 13.7 SEC (ref 11.5–15.2)
Q-T INTERVAL, ECG07: 458 MS
QRS DURATION, ECG06: 90 MS
QTC CALCULATION (BEZET), ECG08: 476 MS
RBC # BLD AUTO: 4.18 M/UL (ref 4.2–5.3)
RBC # BLD AUTO: 4.83 M/UL (ref 4.2–5.3)
SODIUM BLD-SCNC: 143 MMOL/L (ref 136–145)
SODIUM SERPL-SCNC: 140 MMOL/L (ref 136–145)
TROPONIN I SERPL-MCNC: 0.06 NG/ML (ref 0–0.04)
VENTRICULAR RATE, ECG03: 65 BPM
WBC # BLD AUTO: 3.6 K/UL (ref 4.6–13.2)
WBC # BLD AUTO: 6.7 K/UL (ref 4.6–13.2)

## 2018-12-21 PROCEDURE — 85347 COAGULATION TIME ACTIVATED: CPT

## 2018-12-21 PROCEDURE — 77030005401 HC CATH RAD ARRO -A: Performed by: ANESTHESIOLOGY

## 2018-12-21 PROCEDURE — 77030008477 HC STYL SATN SLP COVD -A: Performed by: ANESTHESIOLOGY

## 2018-12-21 PROCEDURE — 74011000250 HC RX REV CODE- 250: Performed by: FAMILY MEDICINE

## 2018-12-21 PROCEDURE — 77030010938 HC CLP LIG TELE -A: Performed by: SURGERY

## 2018-12-21 PROCEDURE — 74011000272 HC RX REV CODE- 272: Performed by: SURGERY

## 2018-12-21 PROCEDURE — 74011250636 HC RX REV CODE- 250/636

## 2018-12-21 PROCEDURE — 77010033678 HC OXYGEN DAILY

## 2018-12-21 PROCEDURE — 77030039266 HC ADH SKN EXOFIN S2SG -A: Performed by: SURGERY

## 2018-12-21 PROCEDURE — 74011000250 HC RX REV CODE- 250: Performed by: SURGERY

## 2018-12-21 PROCEDURE — 74011250636 HC RX REV CODE- 250/636: Performed by: FAMILY MEDICINE

## 2018-12-21 PROCEDURE — 77030008683 HC TU ET CUF COVD -A: Performed by: ANESTHESIOLOGY

## 2018-12-21 PROCEDURE — 77030005518 HC CATH URETH FOL 2W BARD -B: Performed by: SURGERY

## 2018-12-21 PROCEDURE — 82947 ASSAY GLUCOSE BLOOD QUANT: CPT

## 2018-12-21 PROCEDURE — 85730 THROMBOPLASTIN TIME PARTIAL: CPT

## 2018-12-21 PROCEDURE — 061N09Y BYPASS LEFT FEMORAL VEIN TO LOWER VEIN WITH AUTOLOGOUS VENOUS TISSUE, OPEN APPROACH: ICD-10-PCS | Performed by: SURGERY

## 2018-12-21 PROCEDURE — 77030031139 HC SUT VCRL2 J&J -A: Performed by: SURGERY

## 2018-12-21 PROCEDURE — 77030008463 HC STPLR SKN PROX J&J -B: Performed by: SURGERY

## 2018-12-21 PROCEDURE — 77030020256 HC SOL INJ NACL 0.9%  500ML: Performed by: SURGERY

## 2018-12-21 PROCEDURE — 04CU0Z6: ICD-10-PCS | Performed by: SURGERY

## 2018-12-21 PROCEDURE — P9047 ALBUMIN (HUMAN), 25%, 50ML: HCPCS

## 2018-12-21 PROCEDURE — C1768 GRAFT, VASCULAR: HCPCS | Performed by: SURGERY

## 2018-12-21 PROCEDURE — 77030013079 HC BLNKT BAIR HGGR 3M -A: Performed by: ANESTHESIOLOGY

## 2018-12-21 PROCEDURE — 74011250636 HC RX REV CODE- 250/636: Performed by: SURGERY

## 2018-12-21 PROCEDURE — 04UN07Z SUPPLEMENT LEFT POPLITEAL ARTERY WITH AUTOLOGOUS TISSUE SUBSTITUTE, OPEN APPROACH: ICD-10-PCS | Performed by: SURGERY

## 2018-12-21 PROCEDURE — 82962 GLUCOSE BLOOD TEST: CPT

## 2018-12-21 PROCEDURE — 76210000016 HC OR PH I REC 1 TO 1.5 HR: Performed by: SURGERY

## 2018-12-21 PROCEDURE — 85014 HEMATOCRIT: CPT

## 2018-12-21 PROCEDURE — 77030011267 HC ELECTRD BLD COVD -A: Performed by: SURGERY

## 2018-12-21 PROCEDURE — 84484 ASSAY OF TROPONIN QUANT: CPT

## 2018-12-21 PROCEDURE — 93005 ELECTROCARDIOGRAM TRACING: CPT

## 2018-12-21 PROCEDURE — 80048 BASIC METABOLIC PNL TOTAL CA: CPT

## 2018-12-21 PROCEDURE — 77030002987 HC SUT PROL J&J -B: Performed by: SURGERY

## 2018-12-21 PROCEDURE — 77030011391 HC HD CUT VEIN URAC -E: Performed by: SURGERY

## 2018-12-21 PROCEDURE — 74011000250 HC RX REV CODE- 250

## 2018-12-21 PROCEDURE — 77030002933 HC SUT MCRYL J&J -A: Performed by: SURGERY

## 2018-12-21 PROCEDURE — 74011250637 HC RX REV CODE- 250/637: Performed by: FAMILY MEDICINE

## 2018-12-21 PROCEDURE — 77030008462 HC STPLR SKN PROX J&J -A: Performed by: SURGERY

## 2018-12-21 PROCEDURE — 77030018836 HC SOL IRR NACL ICUM -A: Performed by: SURGERY

## 2018-12-21 PROCEDURE — 85610 PROTHROMBIN TIME: CPT

## 2018-12-21 PROCEDURE — 77030013797 HC KT TRNSDUC PRSSR EDWD -A: Performed by: ANESTHESIOLOGY

## 2018-12-21 PROCEDURE — 85025 COMPLETE CBC W/AUTO DIFF WBC: CPT

## 2018-12-21 PROCEDURE — 36600 WITHDRAWAL OF ARTERIAL BLOOD: CPT

## 2018-12-21 PROCEDURE — 77030012407 HC DRN WND BARD -B: Performed by: SURGERY

## 2018-12-21 PROCEDURE — 76060000047 HC ANESTHESIA 8 TO 8.5 HR: Performed by: SURGERY

## 2018-12-21 PROCEDURE — 04UU07Z SUPPLEMENT LEFT PERONEAL ARTERY WITH AUTOLOGOUS TISSUE SUBSTITUTE, OPEN APPROACH: ICD-10-PCS | Performed by: SURGERY

## 2018-12-21 PROCEDURE — 77030018673: Performed by: SURGERY

## 2018-12-21 PROCEDURE — 85018 HEMOGLOBIN: CPT

## 2018-12-21 PROCEDURE — 76010000211 HC CV SURG 8 TO 8.5 HR INTENSV-TIER 1: Performed by: SURGERY

## 2018-12-21 PROCEDURE — 77030014647 HC SEAL FBRN TISSL BAXT -D: Performed by: SURGERY

## 2018-12-21 PROCEDURE — 65610000006 HC RM INTENSIVE CARE

## 2018-12-21 PROCEDURE — 77030018702 HC CLP LIG TI TELE -A: Performed by: SURGERY

## 2018-12-21 PROCEDURE — 04CN0ZZ EXTIRPATION OF MATTER FROM LEFT POPLITEAL ARTERY, OPEN APPROACH: ICD-10-PCS | Performed by: SURGERY

## 2018-12-21 PROCEDURE — 77030010516 HC APPL HEMA CLP TELE -B: Performed by: SURGERY

## 2018-12-21 PROCEDURE — 36415 COLL VENOUS BLD VENIPUNCTURE: CPT

## 2018-12-21 PROCEDURE — 74011000258 HC RX REV CODE- 258

## 2018-12-21 DEVICE — XENOSURE BIOLOGIC PATCH, 1CM X 6CM
Type: IMPLANTABLE DEVICE | Site: LEG | Status: FUNCTIONAL
Brand: XENOSURE BIOLOGIC PATCH

## 2018-12-21 RX ORDER — DOPAMINE HYDROCHLORIDE 160 MG/100ML
0-10 INJECTION, SOLUTION INTRAVENOUS
Status: DISCONTINUED | OUTPATIENT
Start: 2018-12-21 | End: 2018-12-25

## 2018-12-21 RX ORDER — VASOPRESSIN 20 U/ML
INJECTION PARENTERAL AS NEEDED
Status: DISCONTINUED | OUTPATIENT
Start: 2018-12-21 | End: 2018-12-21 | Stop reason: HOSPADM

## 2018-12-21 RX ORDER — DEXTROSE MONOHYDRATE 25 G/50ML
25-50 INJECTION, SOLUTION INTRAVENOUS AS NEEDED
Status: DISCONTINUED | OUTPATIENT
Start: 2018-12-21 | End: 2018-12-21 | Stop reason: HOSPADM

## 2018-12-21 RX ORDER — FENTANYL CITRATE 50 UG/ML
INJECTION, SOLUTION INTRAMUSCULAR; INTRAVENOUS AS NEEDED
Status: DISCONTINUED | OUTPATIENT
Start: 2018-12-21 | End: 2018-12-21 | Stop reason: HOSPADM

## 2018-12-21 RX ORDER — CEFAZOLIN SODIUM 2 G/50ML
SOLUTION INTRAVENOUS
Status: COMPLETED
Start: 2018-12-21 | End: 2018-12-21

## 2018-12-21 RX ORDER — NOREPINEPHRINE BIT/0.9 % NACL 8 MG/250ML
2-16 INFUSION BOTTLE (ML) INTRAVENOUS
Status: DISCONTINUED | OUTPATIENT
Start: 2018-12-21 | End: 2018-12-25

## 2018-12-21 RX ORDER — MORPHINE SULFATE 4 MG/ML
INJECTION INTRAVENOUS
Status: DISCONTINUED
Start: 2018-12-21 | End: 2018-12-22

## 2018-12-21 RX ORDER — ONDANSETRON 2 MG/ML
INJECTION INTRAMUSCULAR; INTRAVENOUS AS NEEDED
Status: DISCONTINUED | OUTPATIENT
Start: 2018-12-21 | End: 2018-12-21 | Stop reason: HOSPADM

## 2018-12-21 RX ORDER — SODIUM CHLORIDE, SODIUM LACTATE, POTASSIUM CHLORIDE, CALCIUM CHLORIDE 600; 310; 30; 20 MG/100ML; MG/100ML; MG/100ML; MG/100ML
125 INJECTION, SOLUTION INTRAVENOUS CONTINUOUS
Status: DISCONTINUED | OUTPATIENT
Start: 2018-12-21 | End: 2018-12-21 | Stop reason: HOSPADM

## 2018-12-21 RX ORDER — HYDROMORPHONE HYDROCHLORIDE 1 MG/ML
INJECTION, SOLUTION INTRAMUSCULAR; INTRAVENOUS; SUBCUTANEOUS AS NEEDED
Status: DISCONTINUED | OUTPATIENT
Start: 2018-12-21 | End: 2018-12-21 | Stop reason: HOSPADM

## 2018-12-21 RX ORDER — MORPHINE SULFATE 2 MG/ML
4 INJECTION, SOLUTION INTRAMUSCULAR; INTRAVENOUS
Status: DISCONTINUED | OUTPATIENT
Start: 2018-12-21 | End: 2018-12-21

## 2018-12-21 RX ORDER — PROPOFOL 10 MG/ML
INJECTION, EMULSION INTRAVENOUS AS NEEDED
Status: DISCONTINUED | OUTPATIENT
Start: 2018-12-21 | End: 2018-12-21 | Stop reason: HOSPADM

## 2018-12-21 RX ORDER — MAGNESIUM SULFATE 100 %
4 CRYSTALS MISCELLANEOUS AS NEEDED
Status: DISCONTINUED | OUTPATIENT
Start: 2018-12-21 | End: 2018-12-21 | Stop reason: HOSPADM

## 2018-12-21 RX ORDER — HEPARIN SODIUM 1000 [USP'U]/ML
3000 INJECTION, SOLUTION INTRAVENOUS; SUBCUTANEOUS ONCE
Status: DISCONTINUED | OUTPATIENT
Start: 2018-12-21 | End: 2018-12-21 | Stop reason: SDUPTHER

## 2018-12-21 RX ORDER — HEPARIN SODIUM 1000 [USP'U]/ML
INJECTION, SOLUTION INTRAVENOUS; SUBCUTANEOUS AS NEEDED
Status: DISCONTINUED | OUTPATIENT
Start: 2018-12-21 | End: 2018-12-21 | Stop reason: HOSPADM

## 2018-12-21 RX ORDER — EPHEDRINE SULFATE 50 MG/ML
INJECTION, SOLUTION INTRAVENOUS AS NEEDED
Status: DISCONTINUED | OUTPATIENT
Start: 2018-12-21 | End: 2018-12-21 | Stop reason: HOSPADM

## 2018-12-21 RX ORDER — DOPAMINE HYDROCHLORIDE 160 MG/100ML
INJECTION, SOLUTION INTRAVENOUS
Status: DISCONTINUED | OUTPATIENT
Start: 2018-12-21 | End: 2018-12-21 | Stop reason: HOSPADM

## 2018-12-21 RX ORDER — MORPHINE SULFATE 4 MG/ML
4 INJECTION INTRAVENOUS
Status: DISCONTINUED | OUTPATIENT
Start: 2018-12-21 | End: 2018-12-29 | Stop reason: SDUPTHER

## 2018-12-21 RX ORDER — VECURONIUM BROMIDE FOR INJECTION 1 MG/ML
INJECTION, POWDER, LYOPHILIZED, FOR SOLUTION INTRAVENOUS AS NEEDED
Status: DISCONTINUED | OUTPATIENT
Start: 2018-12-21 | End: 2018-12-21 | Stop reason: HOSPADM

## 2018-12-21 RX ORDER — DEXAMETHASONE SODIUM PHOSPHATE 4 MG/ML
INJECTION, SOLUTION INTRA-ARTICULAR; INTRALESIONAL; INTRAMUSCULAR; INTRAVENOUS; SOFT TISSUE AS NEEDED
Status: DISCONTINUED | OUTPATIENT
Start: 2018-12-21 | End: 2018-12-21 | Stop reason: HOSPADM

## 2018-12-21 RX ORDER — ONDANSETRON 2 MG/ML
4 INJECTION INTRAMUSCULAR; INTRAVENOUS
Status: DISCONTINUED | OUTPATIENT
Start: 2018-12-21 | End: 2019-01-01 | Stop reason: HOSPADM

## 2018-12-21 RX ORDER — FENTANYL CITRATE 50 UG/ML
50 INJECTION, SOLUTION INTRAMUSCULAR; INTRAVENOUS AS NEEDED
Status: DISCONTINUED | OUTPATIENT
Start: 2018-12-21 | End: 2018-12-21 | Stop reason: HOSPADM

## 2018-12-21 RX ORDER — SODIUM CHLORIDE, SODIUM LACTATE, POTASSIUM CHLORIDE, CALCIUM CHLORIDE 600; 310; 30; 20 MG/100ML; MG/100ML; MG/100ML; MG/100ML
INJECTION, SOLUTION INTRAVENOUS
Status: DISCONTINUED | OUTPATIENT
Start: 2018-12-21 | End: 2018-12-21 | Stop reason: HOSPADM

## 2018-12-21 RX ORDER — LIDOCAINE HYDROCHLORIDE 20 MG/ML
INJECTION, SOLUTION EPIDURAL; INFILTRATION; INTRACAUDAL; PERINEURAL AS NEEDED
Status: DISCONTINUED | OUTPATIENT
Start: 2018-12-21 | End: 2018-12-21 | Stop reason: HOSPADM

## 2018-12-21 RX ORDER — HEPARIN SODIUM 10000 [USP'U]/100ML
12-25 INJECTION, SOLUTION INTRAVENOUS
Status: DISCONTINUED | OUTPATIENT
Start: 2018-12-21 | End: 2018-12-21 | Stop reason: DRUGHIGH

## 2018-12-21 RX ORDER — HEPARIN SODIUM 10000 [USP'U]/100ML
18-36 INJECTION, SOLUTION INTRAVENOUS
Status: DISCONTINUED | OUTPATIENT
Start: 2018-12-21 | End: 2018-12-24

## 2018-12-21 RX ORDER — INSULIN LISPRO 100 [IU]/ML
INJECTION, SOLUTION INTRAVENOUS; SUBCUTANEOUS ONCE
Status: DISCONTINUED | OUTPATIENT
Start: 2018-12-21 | End: 2018-12-21 | Stop reason: HOSPADM

## 2018-12-21 RX ORDER — HYDROMORPHONE HYDROCHLORIDE 2 MG/ML
0.5 INJECTION, SOLUTION INTRAMUSCULAR; INTRAVENOUS; SUBCUTANEOUS
Status: DISCONTINUED | OUTPATIENT
Start: 2018-12-21 | End: 2018-12-21 | Stop reason: HOSPADM

## 2018-12-21 RX ORDER — HEPARIN SODIUM 1000 [USP'U]/ML
5984 INJECTION, SOLUTION INTRAVENOUS; SUBCUTANEOUS ONCE
Status: COMPLETED | OUTPATIENT
Start: 2018-12-21 | End: 2018-12-21

## 2018-12-21 RX ORDER — PROTAMINE SULFATE 10 MG/ML
INJECTION, SOLUTION INTRAVENOUS AS NEEDED
Status: DISCONTINUED | OUTPATIENT
Start: 2018-12-21 | End: 2018-12-21 | Stop reason: HOSPADM

## 2018-12-21 RX ORDER — ALBUMIN HUMAN 250 G/1000ML
SOLUTION INTRAVENOUS AS NEEDED
Status: DISCONTINUED | OUTPATIENT
Start: 2018-12-21 | End: 2018-12-21 | Stop reason: HOSPADM

## 2018-12-21 RX ADMIN — HYDROMORPHONE HYDROCHLORIDE 0.5 MG: 1 INJECTION, SOLUTION INTRAMUSCULAR; INTRAVENOUS; SUBCUTANEOUS at 14:21

## 2018-12-21 RX ADMIN — SODIUM CHLORIDE, SODIUM LACTATE, POTASSIUM CHLORIDE, CALCIUM CHLORIDE: 600; 310; 30; 20 INJECTION, SOLUTION INTRAVENOUS at 14:07

## 2018-12-21 RX ADMIN — DEXAMETHASONE SODIUM PHOSPHATE 4 MG: 4 INJECTION, SOLUTION INTRA-ARTICULAR; INTRALESIONAL; INTRAMUSCULAR; INTRAVENOUS; SOFT TISSUE at 08:20

## 2018-12-21 RX ADMIN — SODIUM CHLORIDE AND POTASSIUM CHLORIDE: .9; .15 SOLUTION INTRAVENOUS at 17:14

## 2018-12-21 RX ADMIN — VASOPRESSIN 2 UNITS: 20 INJECTION PARENTERAL at 09:31

## 2018-12-21 RX ADMIN — ALBUTEROL SULFATE 2.5 MG: 2.5 SOLUTION RESPIRATORY (INHALATION) at 17:33

## 2018-12-21 RX ADMIN — VASOPRESSIN 1 UNITS: 20 INJECTION PARENTERAL at 12:19

## 2018-12-21 RX ADMIN — VASOPRESSIN 1 UNITS: 20 INJECTION PARENTERAL at 14:58

## 2018-12-21 RX ADMIN — HEPARIN SODIUM 2000 UNITS: 1000 INJECTION, SOLUTION INTRAVENOUS; SUBCUTANEOUS at 14:07

## 2018-12-21 RX ADMIN — VASOPRESSIN 1 UNITS: 20 INJECTION PARENTERAL at 13:14

## 2018-12-21 RX ADMIN — DOPAMINE HYDROCHLORIDE IN DEXTROSE 5 MCG/KG/MIN: 1.6 INJECTION, SOLUTION INTRAVENOUS at 18:24

## 2018-12-21 RX ADMIN — HEPARIN SODIUM AND DEXTROSE 18 UNITS/KG/HR: 10000; 5 INJECTION INTRAVENOUS at 17:29

## 2018-12-21 RX ADMIN — VASOPRESSIN 1 UNITS: 20 INJECTION PARENTERAL at 09:12

## 2018-12-21 RX ADMIN — VASOPRESSIN 1 UNITS: 20 INJECTION PARENTERAL at 13:03

## 2018-12-21 RX ADMIN — ONDANSETRON 4 MG: 2 INJECTION INTRAMUSCULAR; INTRAVENOUS at 18:26

## 2018-12-21 RX ADMIN — DOPAMINE HYDROCHLORIDE 10 MCG/KG/MIN: 160 INJECTION, SOLUTION INTRAVENOUS at 08:04

## 2018-12-21 RX ADMIN — VASOPRESSIN 2 UNITS: 20 INJECTION PARENTERAL at 10:30

## 2018-12-21 RX ADMIN — HEPARIN SODIUM 5984 UNITS: 1000 INJECTION INTRAVENOUS; SUBCUTANEOUS at 22:28

## 2018-12-21 RX ADMIN — NOREPINEPHRINE BITARTRATE 4 MCG/MIN: 1 INJECTION INTRAVENOUS at 18:55

## 2018-12-21 RX ADMIN — VECURONIUM BROMIDE FOR INJECTION 5 MG: 1 INJECTION, POWDER, LYOPHILIZED, FOR SOLUTION INTRAVENOUS at 07:32

## 2018-12-21 RX ADMIN — VASOPRESSIN 1 UNITS: 20 INJECTION PARENTERAL at 09:26

## 2018-12-21 RX ADMIN — PROTAMINE SULFATE 50 MG: 10 INJECTION, SOLUTION INTRAVENOUS at 15:00

## 2018-12-21 RX ADMIN — FENTANYL CITRATE 25 MCG: 50 INJECTION, SOLUTION INTRAMUSCULAR; INTRAVENOUS at 11:59

## 2018-12-21 RX ADMIN — HEPARIN SODIUM 2000 UNITS: 1000 INJECTION, SOLUTION INTRAVENOUS; SUBCUTANEOUS at 11:28

## 2018-12-21 RX ADMIN — VASOPRESSIN 4 UNITS: 20 INJECTION PARENTERAL at 07:50

## 2018-12-21 RX ADMIN — OXYCODONE AND ACETAMINOPHEN 2 TABLET: 5; 325 TABLET ORAL at 17:39

## 2018-12-21 RX ADMIN — SODIUM CHLORIDE, SODIUM LACTATE, POTASSIUM CHLORIDE, CALCIUM CHLORIDE: 600; 310; 30; 20 INJECTION, SOLUTION INTRAVENOUS at 10:57

## 2018-12-21 RX ADMIN — EPHEDRINE SULFATE 10 MG: 50 INJECTION, SOLUTION INTRAVENOUS at 07:39

## 2018-12-21 RX ADMIN — SODIUM CHLORIDE, SODIUM LACTATE, POTASSIUM CHLORIDE, CALCIUM CHLORIDE: 600; 310; 30; 20 INJECTION, SOLUTION INTRAVENOUS at 07:15

## 2018-12-21 RX ADMIN — FENTANYL CITRATE 50 MCG: 50 INJECTION, SOLUTION INTRAMUSCULAR; INTRAVENOUS at 07:32

## 2018-12-21 RX ADMIN — MORPHINE SULFATE 4 MG: 4 INJECTION INTRAVENOUS at 21:14

## 2018-12-21 RX ADMIN — LIDOCAINE HYDROCHLORIDE 50 MG: 20 INJECTION, SOLUTION EPIDURAL; INFILTRATION; INTRACAUDAL; PERINEURAL at 07:32

## 2018-12-21 RX ADMIN — VASOPRESSIN 2 UNITS: 20 INJECTION PARENTERAL at 08:47

## 2018-12-21 RX ADMIN — HEPARIN SODIUM 5000 UNITS: 1000 INJECTION, SOLUTION INTRAVENOUS; SUBCUTANEOUS at 13:09

## 2018-12-21 RX ADMIN — HEPARIN SODIUM 2000 UNITS: 1000 INJECTION, SOLUTION INTRAVENOUS; SUBCUTANEOUS at 12:10

## 2018-12-21 RX ADMIN — HEPARIN SODIUM 2000 UNITS: 1000 INJECTION, SOLUTION INTRAVENOUS; SUBCUTANEOUS at 10:17

## 2018-12-21 RX ADMIN — ONDANSETRON 4 MG: 2 INJECTION INTRAMUSCULAR; INTRAVENOUS at 08:20

## 2018-12-21 RX ADMIN — FENTANYL CITRATE 25 MCG: 50 INJECTION, SOLUTION INTRAMUSCULAR; INTRAVENOUS at 10:09

## 2018-12-21 RX ADMIN — HEPARIN SODIUM 3000 UNITS: 1000 INJECTION, SOLUTION INTRAVENOUS; SUBCUTANEOUS at 09:39

## 2018-12-21 RX ADMIN — PROPOFOL 150 MG: 10 INJECTION, EMULSION INTRAVENOUS at 07:32

## 2018-12-21 RX ADMIN — CEFAZOLIN SODIUM 2 G: 2 SOLUTION INTRAVENOUS at 07:34

## 2018-12-21 RX ADMIN — HEPARIN SODIUM 7500 UNITS: 1000 INJECTION, SOLUTION INTRAVENOUS; SUBCUTANEOUS at 09:19

## 2018-12-21 RX ADMIN — SODIUM CHLORIDE, SODIUM LACTATE, POTASSIUM CHLORIDE, CALCIUM CHLORIDE: 600; 310; 30; 20 INJECTION, SOLUTION INTRAVENOUS at 12:21

## 2018-12-21 RX ADMIN — HYDROMORPHONE HYDROCHLORIDE 0.5 MG: 1 INJECTION, SOLUTION INTRAMUSCULAR; INTRAVENOUS; SUBCUTANEOUS at 12:45

## 2018-12-21 RX ADMIN — ALBUMIN HUMAN 250 ML: 250 SOLUTION INTRAVENOUS at 12:56

## 2018-12-21 RX ADMIN — SODIUM CHLORIDE, SODIUM LACTATE, POTASSIUM CHLORIDE, CALCIUM CHLORIDE: 600; 310; 30; 20 INJECTION, SOLUTION INTRAVENOUS at 09:49

## 2018-12-21 RX ADMIN — VASOPRESSIN 2 UNITS: 20 INJECTION PARENTERAL at 07:58

## 2018-12-21 RX ADMIN — VASOPRESSIN 1 UNITS: 20 INJECTION PARENTERAL at 13:25

## 2018-12-21 RX ADMIN — PROTAMINE SULFATE 50 MG: 10 INJECTION, SOLUTION INTRAVENOUS at 15:10

## 2018-12-21 RX ADMIN — VASOPRESSIN 1 UNITS: 20 INJECTION PARENTERAL at 09:50

## 2018-12-21 RX ADMIN — VASOPRESSIN 2 UNITS: 20 INJECTION PARENTERAL at 08:06

## 2018-12-21 NOTE — PROGRESS NOTES
Problem: Falls - Risk of  Goal: *Absence of Falls  Document Kiko Fall Risk and appropriate interventions in the flowsheet.   Outcome: Progressing Towards Goal  Fall Risk Interventions:  Mobility Interventions: Communicate number of staff needed for ambulation/transfer         Medication Interventions: Patient to call before getting OOB, Teach patient to arise slowly    Elimination Interventions: Call light in reach, Patient to call for help with toileting needs

## 2018-12-21 NOTE — CONSULTS
[facility]  [address], [address] [facility]  Dr. Nathaly Norman, Dr. Bernadette Monreal, Dr. Jaspal Farnsworth Hem  [phone] FAX# [phone]    Consult    Patient: Giulia Martin MRN: [id number]  SSN: [id number]    YOB: 1965  Age: 48 y.o. Sex: female      Subjective:      Giulia Martin is a 48 y.o. female who is being seen for LLE ischemia. Patient has history of smoking recently quit, DM II, CAD s/p Stents and LLE PVD with long SFA/pop stents. She underwent LLE intervention with atherectomy and PTA with palpable DP at end of procedure. She then developed similar pain accompanied by numbness and tingling. She is here for LLE fem pop bypass.      Past Medical History:   Diagnosis Date    Arthritis     Asthma     Depression     Hypertension     Unspecified sleep apnea      Past Surgical History:   Procedure Laterality Date    HX  SECTION      HX HYSTERECTOMY      HX ORTHOPAEDIC      \"plate in R arm\"    HX SHOULDER ARTHROSCOPY Left       Family History   Problem Relation Age of Onset    Diabetes Mother     Hypertension Mother     Heart Disease Mother      Social History     Tobacco Use    Smoking status: Current Every Day Smoker     Packs/day: 0.25   Substance Use Topics    Alcohol use: No      Current Facility-Administered Medications   Medication Dose Route Frequency Provider Last Rate Last Dose    heparin 25,000 units in D5W 250 ml infusion  18-36 Units/kg/hr IntraVENous TITRATE Roxy, Chaz Crespo MD 15 mL/hr at 18 2259 20.05 Units/kg/hr at 18 225    0.9% sodium chloride with KCl 20 mEq/L infusion   IntraVENous CONTINUOUS Seema Miranda MD 75 mL/hr at 18 1801      naloxone (NARCAN) injection 0.4 mg  0.4 mg IntraVENous PRN Seema Miranda MD        potassium chloride (K-DUR, KLOR-CON) SR tablet 40 mEq  40 mEq Oral DAILY Seema Miranda MD   40 mEq at 12/20/18 1801    insulin lispro (HUMALOG) injection   SubCUTAneous Q6H Seema Miranda MD        glucose chewable tablet 16 g  4 Tab Oral PRN Seema Miranda MD        glucagon (GLUCAGEN) injection 1 mg  1 mg IntraMUSCular PRN Obie Miranda MD        lactated Ringers infusion  50 mL/hr IntraVENous CONTINUOUS [place] Davis, ROSITA   Stopped at 12/20/18 1836    ceFAZolin (ANCEF) 2g IVPB in 50 mL D5W  2 g IntraVENous ON CALL TO OR Catracho Coleman MD        morphine injection 4 mg  4 mg IntraVENous Q4H PRN Seema Miranda MD        dextrose (D50) infusion 12.5-25 g  25-50 mL IntraVENous PRN Seema Miranda MD        amLODIPine (NORVASC) tablet 2.5 mg  2.5 mg Oral DAILY Seema Miranda MD        aspirin chewable tablet 81 mg  81 mg Oral DAILY Seema Miranda MD        atorvastatin (LIPITOR) tablet 80 mg  80 mg Oral DAILY Seema Miranda MD        clonazePAM (KlonoPIN) tablet 1 mg  1 mg Oral BID Seema Miranda MD   1 mg at 12/20/18 2059    clopidogrel (PLAVIX) tablet 75 mg  75 mg Oral DAILY Seema Miranda MD        gabapentin (NEURONTIN) capsule 300 mg  300 mg Oral TID Seema Miranda MD   300 mg at 12/20/18 2302    losartan (COZAAR) tablet 50 mg  50 mg Oral DAILY Seema Miranda MD        oxyCODONE-acetaminophen (PERCOCET) 5-325 mg per tablet 1-2 Tab  1-2 Tab Oral Q6H PRN Seema Miranda MD        QUEtiapine SR (SEROquel XR) tablet 300 mg  300 mg Oral QHS Seema Miranda MD   300 mg at 12/20/18 2302    albuterol (PROVENTIL VENTOLIN) nebulizer solution 2.5 mg  2.5 mg Nebulization Q4H PRN Seema Miranda MD            No Known Allergies    Review of Systems:  A comprehensive review of systems was negative.     Objective:     Vitals:    12/20/18 1630 12/20/18 1700 12/20/18 1754 12/21/18 0030   BP: 149/73 130/88 105/71 102/68   Pulse: 90 94 74 74   Resp: 24 24 24 18   Temp:   97.4 °F (36.3 °C) 97.5 °F (36.4 °C)   SpO2: 97% 94% 93% 95%   Weight:       Height:            Physical Exam:  GENERAL: alert, cooperative, no distress, appears stated age  EYE: conjunctivae/corneas clear. PERRL, EOM's intact. Fundi benign  LYMPHATIC: Cervical, supraclavicular, and axillary nodes normal.   THROAT & NECK: normal and no erythema or exudates noted. LUNG: clear to auscultation bilaterally  HEART: regular rate and rhythm, S1, S2 normal, no murmur, click, rub or gallop  ABDOMEN: soft, non-tender. Bowel sounds normal. No masses,  no organomegaly  EXTREMITIES:  LLE with palpable femoral pulse, non palpable distal pulses. Warm. Sensation intact, motor intact. SKIN: Normal.  NEUROLOGIC: AOx3. Gait normal. Reflexes and motor strength normal and symmetric. Cranial nerves 2-12 and sensation grossly intact. PSYCHIATRIC: non focal      Assessment:     Hospital Problems  Date Reviewed: 11/4/2014          Codes Class Noted POA    Atherosclerotic PVD with intermittent claudication ([facility] ICD-10-CM: I70.219  ICD-9-CM: 440.21  12/20/2018 Unknown        Hypokalemia ICD-10-CM: E87.6  ICD-9-CM: 276.8  12/20/2018 Yes        CAD (coronary artery disease) (Chronic) ICD-10-CM: I25.10  ICD-9-CM: 414.00  12/20/2018 Unknown        DM (diabetes mellitus) ([facility] (Chronic) ICD-10-CM: E11.9  ICD-9-CM: 250.00  12/20/2018 Unknown        HTN (hypertension) (Chronic) ICD-10-CM: I10  ICD-9-CM: 401.9  12/20/2018 Unknown        Depression (Chronic) ICD-10-CM: F32.9  ICD-9-CM: 482  12/20/2018 Unknown        Current smoker ICD-10-CM: F17.200  ICD-9-CM: 305.1  12/20/2018 Yes              Plan:     Admit for fem pop tomorrow   NPO at [facility]  Heparin drip. All risk and benefits discussed with patient including but not limited to bleeding, infection, limb loss and kidney disease.      Signed By: Dick Lynch MD     December 21, 2018

## 2018-12-21 NOTE — PROGRESS NOTES
Chart reviewed and pt  verified demographics. She has [place] and [place]. Dr Kayla Almanza is PCP, seen on last Friday. Her Mom MPOA #1 Maged Villalba [phone]/ [phone]  is her first Contact and she will probably drive the patient home. MPOA #2  Is Juan Porter [phone]/ [phone] . Patient lives with her grandson. Patient is independent with ADL. She has a walker at home. She had home health come to her home after a surgery in the past couple of years. She dode not remember which company. Her plan is home, vs HH vs snf. Reason for Admission:   Arthroscopic PVD with Intermiittant claudication surgery               RRAT Score:   11                  Plan for utilizing home health:   TBD                       Likelihood of Readmission:  Low, this was planned procedure after her ER visit.                           Transition of Care Plan:   Home vs Home with HH vs snf

## 2018-12-21 NOTE — PERIOP NOTES
Patient transferred to pre-op unit via bed. Sleeping but easily aroused. No distress observed. Heparin gtt infusing. Sister Jason Freedman present.

## 2018-12-21 NOTE — PERIOP NOTES
1546 Pt received to PACU and connected to monitor. Bedside report given by Dayron Diaz RN. Vital signs stable. Nurse at bedside. Will continue to monitor. [id number] Dr. Riya Ambrosio at bedside to assess pt status. Graft site intact. Pedal/Post-Tibial pulses weak by doppler. 1635 TRANSFER - OUT REPORT:    PACU Summary  Patient arrived to PACU at 1546  Bedside/Verbal report received from Dayron Diaz RN  Vitals:    12/21/18 1610 12/21/18 1615 12/21/18 1630 12/21/18 1635   BP: 97/59 100/62 91/66 98/64   Pulse: 89 86 79 81   Resp: 14 14 14 15   Temp:    98.4 °F (36.9 °C)   SpO2: 100% 100% 100% 100%   Weight:       Height:         Cardiac rhythm: Normal Sinus Rhythm     Lines and Drains  Peripheral Intravenous Line:   Peripheral IV 12/20/18 Right Antecubital (Active)   Site Assessment Clean, dry, & intact 12/21/2018  3:46 PM   Phlebitis Assessment 0 12/21/2018  3:46 PM   Infiltration Assessment 0 12/21/2018  3:46 PM   Dressing Status Clean, dry, & intact 12/21/2018  3:46 PM   Dressing Type Transparent;Tape 12/21/2018  3:46 PM   Hub Color/Line Status Green; Infusing 12/21/2018  3:46 PM   Action Taken Open ports on tubing capped 12/21/2018  3:46 PM   Alcohol Cap Used Yes 12/21/2018  3:46 PM   , Arterial Line:   Arterial Line 12/21/18 Left Radial artery (Active)   Site Assessment Clean, dry, & intact 12/21/2018  3:46 PM   Dressing Status Clean, dry, & intact 12/21/2018  3:46 PM   Dressing Type Transparent;Tape 12/21/2018  3:46 PM   Line Status Intact and in place 12/21/2018  3:46 PM    and Drain(s):   Perez-Carrera Drain 12/21/18 Right Groin (Active)   Site Assessment Clean, dry, & intact 12/21/2018  3:46 PM   Dressing Status Clean, dry, & intact 12/21/2018  3:46 PM   Status Charged; Patent;Draining 12/21/2018  3:46 PM   Drainage Color Serosanguinous 12/21/2018  3:46 PM       Perez-Carrera Drain 12/21/18 Left Groin (Active)   Site Assessment Clean, dry, & intact 12/21/2018  3:46 PM   Dressing Status Clean, dry, & intact 12/21/2018  3:46 PM Status Patent;Draining; Charged 12/21/2018  3:46 PM   Drainage Color Serosanguinous 12/21/2018  3:46 PM       Wound  Wound Hand Left (Active)   Number of days: 1508       Wound Leg Upper Right (Active)   DRESSING STATUS Intact 12/21/2018  3:46 PM   DRESSING TYPE 4 x 4;Other (Comment) 12/21/2018  3:46 PM   Incision site well approximated? Yes 12/21/2018  2:20 PM   Number of days: 0       Wound Leg Upper Left (Active)   DRESSING STATUS Clean, dry, and intact 12/21/2018  3:46 PM   DRESSING TYPE 4 x 4;Staples; Sutures; Other (Comment) 12/21/2018  3:09 PM   Incision site well approximated? Yes 12/21/2018  3:09 PM   Number of days: 0       Wound Leg Lower Left (Active)   DRESSING STATUS Clean, dry, and intact 12/21/2018  3:46 PM   DRESSING TYPE 4 x 4;Other (Comment) 12/21/2018  3:46 PM   Incision site well approximated?  Yes 12/21/2018  3:09 PM   Number of days: 0        Intake and Output    Intake/Output Summary (Last 24 hours) at 12/21/2018 1723  Last data filed at 12/21/2018 1551  Gross per 24 hour   Intake 9472.5 ml   Output 3750 ml   Net 5722.5 ml         Report called to Paul Resendiz in [phone] at [phone]    Patient transported to [address] [place] [address] at [address] [facility] [address]

## 2018-12-21 NOTE — PROGRESS NOTES
conducted a pre-surgery visit with Yolis Perez, who is a 48 y.o.,female. The  provided the following Interventions:  Initiated a relationship of care and support. Offered prayer and assurance of continued prayers on patient's behalf. Plan:  Chaplains will continue to follow and will provide pastoral care on an as needed/requested basis.  recommends bedside caregivers page  on duty if patient shows signs of acute spiritual or emotional distress.     Forrest Ramírezford   Spiritual Care   [phone]

## 2018-12-21 NOTE — PROGRESS NOTES
Progress Note      Patient: Katelyn Arreola               Sex: female          DOA: 12/20/2018       YOB: 1965      Age:  48 y.o.        LOS:  LOS: 1 day               Subjective / Interval Hx  I       Katelyn Arreola is a 48 y.o. female  With pmhx CAD, Cardiac stent x 2, DM, pvd, depression , hld , TIA who presents with leg pain onset 2 month ago. Patient has failed fempop. She was seen by vascular yesterday and had a balloon angioplasty yesterday. Patient was told to come to hospital to be admitted for femoral popliteal bypass scheduled for tomorrow. In the ER she was started on Heparin drip per vascular surgery recommendation. She also has associated numbness and says her leg feels cold and heavy. She is an active smoker. Objective:      Visit Vitals  BP 98/64 (BP 1 Location: Right arm, BP Patient Position: At rest;Head of bed elevated (Comment degrees))   Pulse 81   Temp 98.4 °F (36.9 °C)   Resp 15   Ht 5' 5\" (1.651 m)   Wt 74.8 kg (165 lb)   SpO2 100%   BMI 27.46 kg/m²             Physical Exam   Constitutional: She is oriented to person, place, and time. She appears well-developed and well-nourished. HENT:   Head: Normocephalic and atraumatic. Eyes: Conjunctivae are normal.   Neck: Neck supple. Cardiovascular: Normal rate, regular rhythm and normal heart sounds. No murmur heard. Pulmonary/Chest: Effort normal and breath sounds normal.   Abdominal: Soft. Bowel sounds are normal.   Musculoskeletal: She exhibits no edema. Neurological: She is alert and oriented to person, place, and time. Skin: No rash noted. No erythema. No pallor. Psychiatric: She has a normal mood and affect.          Intake and Output:  Current Shift:  12/21 0701 - 12/21 1900  In: 2479 [I.V.:8500]  Out: 6136 [Urine:3000]  Last three shifts:  12/19 1901 - 12/21 0700  In: 972.5 [I.V.:972.5]  Out: -     Recent Results (from the past 48 hour(s))   METABOLIC PANEL, BASIC    Collection Time: 12/20/18  1:39 PM   Result Value Ref Range    Sodium 135 (L) 136 - 145 mmol/L    Potassium 3.4 (L) 3.5 - 5.5 mmol/L    Chloride 99 (L) 100 - 108 mmol/L    CO2 28 21 - 32 mmol/L    Anion gap 8 3.0 - 18 mmol/L    Glucose 87 74 - 99 mg/dL    BUN 10 7.0 - 18 MG/DL    Creatinine 0.73 0.6 - 1.3 MG/DL    BUN/Creatinine ratio 14 12 - 20      GFR est AA >60 >60 ml/min/1.73m2    GFR est non-AA >60 >60 ml/min/1.73m2    Calcium 8.4 (L) 8.5 - 10.1 MG/DL   HGB & HCT    Collection Time: 12/20/18  1:39 PM   Result Value Ref Range    HGB 11.7 (L) 12.0 - 16.0 g/dL    HCT 38.1 35.0 - 45.0 %   TYPE & SCREEN    Collection Time: 12/20/18  1:40 PM   Result Value Ref Range    Crossmatch Expiration 12/24/2018     ABO/Rh(D) B POSITIVE     Antibody screen NEG    CBC WITH AUTOMATED DIFF    Collection Time: 12/20/18  2:56 PM   Result Value Ref Range    WBC 6.1 4.6 - 13.2 K/uL    RBC 5.47 (H) 4.20 - 5.30 M/uL    HGB 11.7 (L) 12.0 - 16.0 g/dL    HCT 37.9 35.0 - 45.0 %    MCV 69.3 (L) 74.0 - 97.0 FL    MCH 21.4 (L) 24.0 - 34.0 PG    MCHC 30.9 (L) 31.0 - 37.0 g/dL    RDW 14.5 11.6 - 14.5 %    PLATELET 243 291 - 774 K/uL    MPV 11.2 9.2 - 11.8 FL    NEUTROPHILS 65 40 - 73 %    LYMPHOCYTES 24 21 - 52 %    MONOCYTES 9 3 - 10 %    EOSINOPHILS 2 0 - 5 %    BASOPHILS 0 0 - 2 %    ABS. NEUTROPHILS 4.0 1.8 - 8.0 K/UL    ABS. LYMPHOCYTES 1.5 0.9 - 3.6 K/UL    ABS. MONOCYTES 0.5 0.05 - 1.2 K/UL    ABS. EOSINOPHILS 0.1 0.0 - 0.4 K/UL    ABS.  BASOPHILS 0.0 0.0 - 0.1 K/UL    DF AUTOMATED     METABOLIC PANEL, COMPREHENSIVE    Collection Time: 12/20/18  2:56 PM   Result Value Ref Range    Sodium 136 136 - 145 mmol/L    Potassium 3.2 (L) 3.5 - 5.5 mmol/L    Chloride 101 100 - 108 mmol/L    CO2 30 21 - 32 mmol/L    Anion gap 5 3.0 - 18 mmol/L    Glucose 91 74 - 99 mg/dL    BUN 10 7.0 - 18 MG/DL    Creatinine 0.75 0.6 - 1.3 MG/DL    BUN/Creatinine ratio 13 12 - 20      GFR est AA >60 >60 ml/min/1.73m2    GFR est non-AA >60 >60 ml/min/1.73m2    Calcium 8.3 (L) 8.5 - 10.1 MG/DL Bilirubin, total 0.8 0.2 - 1.0 MG/DL    ALT (SGPT) 26 13 - 56 U/L    AST (SGOT) 37 15 - 37 U/L    Alk.  phosphatase 66 45 - 117 U/L    Protein, total 8.4 (H) 6.4 - 8.2 g/dL    Albumin 3.4 3.4 - 5.0 g/dL    Globulin 5.0 (H) 2.0 - 4.0 g/dL    A-G Ratio 0.7 (L) 0.8 - 1.7     PROTHROMBIN TIME + INR    Collection Time: 12/20/18  2:56 PM   Result Value Ref Range    Prothrombin time 13.2 11.5 - 15.2 sec    INR 1.0 0.8 - 1.2     GLUCOSE, POC    Collection Time: 12/20/18  6:21 PM   Result Value Ref Range    Glucose (POC) 159 (H) 70 - 110 mg/dL   PTT    Collection Time: 12/20/18  9:21 PM   Result Value Ref Range    aPTT 57.3 (H) 23.0 - 36.4 SEC   HEMATOCRIT    Collection Time: 12/21/18 12:00 AM   Result Value Ref Range    HCT 34.6 (L) 35.0 - 45.0 %   HEMOGLOBIN    Collection Time: 12/21/18 12:00 AM   Result Value Ref Range    HGB 10.7 (L) 12.0 - 16.0 g/dL   GLUCOSE, POC    Collection Time: 12/21/18 12:18 AM   Result Value Ref Range    Glucose (POC) 123 (H) 70 - 110 mg/dL   EKG, 12 LEAD, INITIAL    Collection Time: 12/21/18 12:21 AM   Result Value Ref Range    Ventricular Rate 65 BPM    Atrial Rate 65 BPM    P-R Interval 138 ms    QRS Duration 90 ms    Q-T Interval 458 ms    QTC Calculation (Bezet) 476 ms    Calculated P Axis 65 degrees    Calculated R Axis 36 degrees    Calculated T Axis -16 degrees    Diagnosis       Normal sinus rhythm  RSR' or QR pattern in V1 suggests right ventricular conduction delay  T wave abnormality, consider inferior ischemia  T wave abnormality, consider anterolateral ischemia  Prolonged QT  Abnormal ECG  When compared with ECG of 30-OCT-2014 12:20,  RSR' pattern in V1 is now present  Inverted T waves have replaced nonspecific T wave abnormality in Inferior   leads  QT has lengthened  Confirmed by Malcolm Guerrero [id number]) on 12/21/2018 11:38:07 AM     CBC WITH AUTOMATED DIFF    Collection Time: 12/21/18  5:00 AM   Result Value Ref Range    WBC 3.6 (L) 4.6 - 13.2 K/uL    RBC 4.83 4.20 - 5.30 M/uL    HGB 10.1 (L) 12.0 - 16.0 g/dL    HCT 33.5 (L) 35.0 - 45.0 %    MCV 69.4 (L) 74.0 - 97.0 FL    MCH 20.9 (L) 24.0 - 34.0 PG    MCHC 30.1 (L) 31.0 - 37.0 g/dL    RDW 14.6 (H) 11.6 - 14.5 %    PLATELET 201 999 - 275 K/uL    MPV 10.7 9.2 - 11.8 FL    NEUTROPHILS 45 40 - 73 %    LYMPHOCYTES 39 21 - 52 %    MONOCYTES 10 3 - 10 %    EOSINOPHILS 5 0 - 5 %    BASOPHILS 1 0 - 2 %    ABS. NEUTROPHILS 1.6 (L) 1.8 - 8.0 K/UL    ABS. LYMPHOCYTES 1.4 0.9 - 3.6 K/UL    ABS. MONOCYTES 0.4 0.05 - 1.2 K/UL    ABS. EOSINOPHILS 0.2 0.0 - 0.4 K/UL    ABS.  BASOPHILS 0.0 0.0 - 0.1 K/UL    DF AUTOMATED     PROTHROMBIN TIME + INR    Collection Time: 12/21/18  5:00 AM   Result Value Ref Range    Prothrombin time 13.7 11.5 - 15.2 sec    INR 1.1 0.8 - 1.2     METABOLIC PANEL, BASIC    Collection Time: 12/21/18  5:00 AM   Result Value Ref Range    Sodium 140 136 - 145 mmol/L    Potassium 3.6 3.5 - 5.5 mmol/L    Chloride 107 100 - 108 mmol/L    CO2 25 21 - 32 mmol/L    Anion gap 8 3.0 - 18 mmol/L    Glucose 100 (H) 74 - 99 mg/dL    BUN 19 (H) 7.0 - 18 MG/DL    Creatinine 0.72 0.6 - 1.3 MG/DL    BUN/Creatinine ratio 26 (H) 12 - 20      GFR est AA >60 >60 ml/min/1.73m2    GFR est non-AA >60 >60 ml/min/1.73m2    Calcium 8.0 (L) 8.5 - 10.1 MG/DL   PTT    Collection Time: 12/21/18  5:00 AM   Result Value Ref Range    aPTT 106.6 (H) 23.0 - 36.4 SEC   GLUCOSE, POC    Collection Time: 12/21/18  6:09 AM   Result Value Ref Range    Glucose (POC) 92 70 - 110 mg/dL   POC ACTIVATED CLOTTING TIME    Collection Time: 12/21/18  9:35 AM   Result Value Ref Range    Activated Clotting Time (POC) 213 (H) 79 - 138 SECS   POC ACTIVATED CLOTTING TIME    Collection Time: 12/21/18 12:05 PM   Result Value Ref Range    Activated Clotting Time (POC) 202 (H) 79 - 138 SECS   POC ACTIVATED CLOTTING TIME    Collection Time: 12/21/18  1:04 PM   Result Value Ref Range    Activated Clotting Time (POC) 186 (H) 79 - 138 SECS   POC 6 PLUS    Collection Time: 12/21/18  1:26 PM   Result Value Ref Range    Sodium,  136 - 145 MMOL/L    Potassium, POC 3.5 3.5 - 5.5 MMOL/L    Chloride,  100 - 108 MMOL/L    BUN, POC 10 7 - 18 MG/DL    Glucose,  (H) 74 - 106 MG/DL    Hematocrit, POC 28 (L) 36 - 49 %    Hemoglobin, POC 9.5 (L) 12 - 16 G/DL   GLUCOSE, POC    Collection Time: 12/21/18  3:50 PM   Result Value Ref Range    Glucose (POC) 151 (H) 70 - 110 mg/dL       Lab/Data Reviewed: All lab results for the last 24 hours reviewed.     XRays were reviewed in past 24 hours    Medications Reviewed            Assessment/Plan     Active Problems:    Atherosclerotic PVD with intermittent claudication (HCC) (12/20/2018)      Hypokalemia (12/20/2018)      CAD (coronary artery disease) (12/20/2018)      DM (diabetes mellitus) ([facility] (12/20/2018)      HTN (hypertension) (12/20/2018)      Depression (12/20/2018)      Current smoker (12/20/2018)          CARE PLAN:     PVD with Claudication   - Continue Heparin drip   - pain control as needed   - Patient scheduled for femoral popliteal bypass today AM  - Vascular Surgery consulting      CAD  - Plavix  - Losartan   - aspirin   - Lipitor      DM  - SSI   - A1c     HTN  - losartan , Norvasc      Depression   - Wellbutrin , Seroquel, clonepin      Smoker   - advise cessation      Hypokalemia   - replace      DM Neuropathy  - Neurontin      DVT prophylaxis      Full code       Carol Caldwell MD  December 21, 2018

## 2018-12-21 NOTE — PROGRESS NOTES
5 - notified Dr. Michael Maguire that ptt not drawn prior to heparin gtt starting. Orders to continue gtt & proceed based on protocol. Primary RN notified.

## 2018-12-21 NOTE — PROGRESS NOTES
Pt. Sent to OR in stable condition. BS=92. PTT is theraputic so no rate change & pt sent on heparin gtt. Pt's sister was escorted to waiting area in [facility] care.

## 2018-12-21 NOTE — ANESTHESIA PROCEDURE NOTES
Arterial Line Placement    Start time: 12/21/2018 7:43 AM  End time: 12/21/2018 7:46 AM  Performed by: Froylan Barajas CRNA  Authorized by: Theoplis Gowers, MD     Pre-Procedure  Indications:  Arterial pressure monitoring and blood sampling    Procedure:   Prep:  Chlorhexidine  Orientation:  Left  Location:  Radial artery  Catheter size:  18 G  Cont Cardiac Output Sensor: No      Assessment:   Post-procedure:  Line secured and sterile dressing applied  Patient Tolerance:  Patient tolerated the procedure well with no immediate complications

## 2018-12-22 ENCOUNTER — APPOINTMENT (OUTPATIENT)
Dept: GENERAL RADIOLOGY | Age: 53
DRG: 253 | End: 2018-12-22
Attending: FAMILY MEDICINE
Payer: MEDICARE

## 2018-12-22 ENCOUNTER — APPOINTMENT (OUTPATIENT)
Dept: NON INVASIVE DIAGNOSTICS | Age: 53
DRG: 253 | End: 2018-12-22
Attending: SURGERY
Payer: MEDICARE

## 2018-12-22 PROBLEM — D64.9 ANEMIA: Status: ACTIVE | Noted: 2018-12-22

## 2018-12-22 LAB
ANION GAP SERPL CALC-SCNC: 7 MMOL/L (ref 3–18)
APTT PPP: 113.3 SEC (ref 23–36.4)
APTT PPP: 78.1 SEC (ref 23–36.4)
APTT PPP: >180 SEC (ref 23–36.4)
ATRIAL RATE: 114 BPM
ATRIAL RATE: 77 BPM
BASOPHILS # BLD: 0 K/UL (ref 0–0.1)
BASOPHILS NFR BLD: 0 % (ref 0–2)
BUN SERPL-MCNC: 7 MG/DL (ref 7–18)
BUN/CREAT SERPL: 13 (ref 12–20)
CALCIUM SERPL-MCNC: 7.8 MG/DL (ref 8.5–10.1)
CALCULATED P AXIS, ECG09: 68 DEGREES
CALCULATED P AXIS, ECG09: 71 DEGREES
CALCULATED R AXIS, ECG10: 31 DEGREES
CALCULATED R AXIS, ECG10: 41 DEGREES
CALCULATED T AXIS, ECG11: 46 DEGREES
CALCULATED T AXIS, ECG11: 57 DEGREES
CHLORIDE SERPL-SCNC: 111 MMOL/L (ref 100–108)
CK MB CFR SERPL CALC: 0.9 % (ref 0–4)
CK MB SERPL-MCNC: 9.2 NG/ML (ref 5–25)
CK SERPL-CCNC: 1046 U/L (ref 26–192)
CO2 SERPL-SCNC: 28 MMOL/L (ref 21–32)
CREAT SERPL-MCNC: 0.55 MG/DL (ref 0.6–1.3)
DIAGNOSIS, 93000: NORMAL
DIAGNOSIS, 93000: NORMAL
DIFFERENTIAL METHOD BLD: ABNORMAL
EOSINOPHIL # BLD: 0 K/UL (ref 0–0.4)
EOSINOPHIL NFR BLD: 0 % (ref 0–5)
ERYTHROCYTE [DISTWIDTH] IN BLOOD BY AUTOMATED COUNT: 14.4 % (ref 11.6–14.5)
FERRITIN SERPL-MCNC: 235 NG/ML (ref 8–388)
GLUCOSE BLD STRIP.AUTO-MCNC: 109 MG/DL (ref 70–110)
GLUCOSE BLD STRIP.AUTO-MCNC: 113 MG/DL (ref 70–110)
GLUCOSE BLD STRIP.AUTO-MCNC: 122 MG/DL (ref 70–110)
GLUCOSE BLD STRIP.AUTO-MCNC: 145 MG/DL (ref 70–110)
GLUCOSE SERPL-MCNC: 145 MG/DL (ref 74–99)
HCT VFR BLD AUTO: 26.8 % (ref 35–45)
HGB BLD-MCNC: 8.3 G/DL (ref 12–16)
IRON SATN MFR SERPL: 18 %
IRON SERPL-MCNC: 32 UG/DL (ref 50–175)
LYMPHOCYTES # BLD: 1.5 K/UL (ref 0.9–3.6)
LYMPHOCYTES NFR BLD: 29 % (ref 21–52)
MAGNESIUM SERPL-MCNC: 2 MG/DL (ref 1.6–2.6)
MCH RBC QN AUTO: 21 PG (ref 24–34)
MCHC RBC AUTO-ENTMCNC: 31 G/DL (ref 31–37)
MCV RBC AUTO: 67.7 FL (ref 74–97)
MONOCYTES # BLD: 0.8 K/UL (ref 0.05–1.2)
MONOCYTES NFR BLD: 15 % (ref 3–10)
NEUTS SEG # BLD: 3 K/UL (ref 1.8–8)
NEUTS SEG NFR BLD: 56 % (ref 40–73)
P-R INTERVAL, ECG05: 118 MS
P-R INTERVAL, ECG05: 134 MS
PLATELET # BLD AUTO: 228 K/UL (ref 135–420)
PMV BLD AUTO: 11.3 FL (ref 9.2–11.8)
POTASSIUM SERPL-SCNC: 3.8 MMOL/L (ref 3.5–5.5)
Q-T INTERVAL, ECG07: 390 MS
Q-T INTERVAL, ECG07: 398 MS
QRS DURATION, ECG06: 78 MS
QRS DURATION, ECG06: 84 MS
QTC CALCULATION (BEZET), ECG08: 450 MS
QTC CALCULATION (BEZET), ECG08: 537 MS
RBC # BLD AUTO: 3.96 M/UL (ref 4.2–5.3)
SODIUM SERPL-SCNC: 146 MMOL/L (ref 136–145)
TIBC SERPL-MCNC: 178 UG/DL (ref 250–450)
TROPONIN I SERPL-MCNC: 0.03 NG/ML (ref 0–0.04)
TROPONIN I SERPL-MCNC: 0.05 NG/ML (ref 0–0.04)
VENTRICULAR RATE, ECG03: 114 BPM
VENTRICULAR RATE, ECG03: 77 BPM
WBC # BLD AUTO: 5.4 K/UL (ref 4.6–13.2)

## 2018-12-22 PROCEDURE — 80048 BASIC METABOLIC PNL TOTAL CA: CPT

## 2018-12-22 PROCEDURE — 82550 ASSAY OF CK (CPK): CPT

## 2018-12-22 PROCEDURE — 74011250636 HC RX REV CODE- 250/636: Performed by: FAMILY MEDICINE

## 2018-12-22 PROCEDURE — 93005 ELECTROCARDIOGRAM TRACING: CPT

## 2018-12-22 PROCEDURE — 83735 ASSAY OF MAGNESIUM: CPT

## 2018-12-22 PROCEDURE — 83540 ASSAY OF IRON: CPT

## 2018-12-22 PROCEDURE — 82962 GLUCOSE BLOOD TEST: CPT

## 2018-12-22 PROCEDURE — 77030037878 HC DRSG MEPILEX >48IN BORD MOLN -B

## 2018-12-22 PROCEDURE — 85730 THROMBOPLASTIN TIME PARTIAL: CPT

## 2018-12-22 PROCEDURE — 85025 COMPLETE CBC W/AUTO DIFF WBC: CPT

## 2018-12-22 PROCEDURE — 77010033678 HC OXYGEN DAILY

## 2018-12-22 PROCEDURE — 36600 WITHDRAWAL OF ARTERIAL BLOOD: CPT

## 2018-12-22 PROCEDURE — 74011000258 HC RX REV CODE- 258: Performed by: FAMILY MEDICINE

## 2018-12-22 PROCEDURE — 74011250637 HC RX REV CODE- 250/637: Performed by: INTERNAL MEDICINE

## 2018-12-22 PROCEDURE — 93306 TTE W/DOPPLER COMPLETE: CPT

## 2018-12-22 PROCEDURE — 74011000250 HC RX REV CODE- 250: Performed by: SURGERY

## 2018-12-22 PROCEDURE — 82728 ASSAY OF FERRITIN: CPT

## 2018-12-22 PROCEDURE — 71045 X-RAY EXAM CHEST 1 VIEW: CPT

## 2018-12-22 PROCEDURE — 65610000006 HC RM INTENSIVE CARE

## 2018-12-22 PROCEDURE — 74011250637 HC RX REV CODE- 250/637: Performed by: FAMILY MEDICINE

## 2018-12-22 PROCEDURE — 74011250636 HC RX REV CODE- 250/636: Performed by: SURGERY

## 2018-12-22 PROCEDURE — 36415 COLL VENOUS BLD VENIPUNCTURE: CPT

## 2018-12-22 RX ORDER — GUAIFENESIN 100 MG/5ML
81 LIQUID (ML) ORAL DAILY
Status: DISCONTINUED | OUTPATIENT
Start: 2018-12-22 | End: 2018-12-25

## 2018-12-22 RX ORDER — SODIUM CHLORIDE 450 MG/100ML
125 INJECTION, SOLUTION INTRAVENOUS ONCE
Status: COMPLETED | OUTPATIENT
Start: 2018-12-22 | End: 2018-12-22

## 2018-12-22 RX ORDER — DEXTROMETHORPHAN HYDROBROMIDE, GUAIFENESIN 5; 100 MG/5ML; MG/5ML
650 LIQUID ORAL EVERY 6 HOURS
Status: DISCONTINUED | OUTPATIENT
Start: 2018-12-22 | End: 2018-12-22

## 2018-12-22 RX ORDER — HYDROMORPHONE HYDROCHLORIDE 1 MG/ML
1 INJECTION, SOLUTION INTRAMUSCULAR; INTRAVENOUS; SUBCUTANEOUS
Status: DISCONTINUED | OUTPATIENT
Start: 2018-12-22 | End: 2018-12-29

## 2018-12-22 RX ORDER — METOPROLOL TARTRATE 25 MG/1
25 TABLET, FILM COATED ORAL EVERY 12 HOURS
Status: DISCONTINUED | OUTPATIENT
Start: 2018-12-22 | End: 2018-12-25

## 2018-12-22 RX ORDER — DEXTROMETHORPHAN HYDROBROMIDE, GUAIFENESIN 5; 100 MG/5ML; MG/5ML
650 LIQUID ORAL
Status: DISCONTINUED | OUTPATIENT
Start: 2018-12-22 | End: 2018-12-29

## 2018-12-22 RX ADMIN — POTASSIUM CHLORIDE 40 MEQ: 20 TABLET, EXTENDED RELEASE ORAL at 08:56

## 2018-12-22 RX ADMIN — GABAPENTIN 300 MG: 300 CAPSULE ORAL at 08:57

## 2018-12-22 RX ADMIN — MORPHINE SULFATE 4 MG: 4 INJECTION INTRAVENOUS at 06:18

## 2018-12-22 RX ADMIN — CLOPIDOGREL BISULFATE 75 MG: 75 TABLET, FILM COATED ORAL at 08:58

## 2018-12-22 RX ADMIN — MORPHINE SULFATE 4 MG: 4 INJECTION INTRAVENOUS at 02:38

## 2018-12-22 RX ADMIN — SODIUM CHLORIDE 125 ML/HR: 450 INJECTION, SOLUTION INTRAVENOUS at 09:02

## 2018-12-22 RX ADMIN — ACETAMINOPHEN 650 MG: 650 TABLET, FILM COATED, EXTENDED RELEASE ORAL at 18:08

## 2018-12-22 RX ADMIN — ATORVASTATIN CALCIUM 80 MG: 40 TABLET, FILM COATED ORAL at 08:58

## 2018-12-22 RX ADMIN — CLONAZEPAM 1 MG: 0.5 TABLET ORAL at 17:27

## 2018-12-22 RX ADMIN — ASPIRIN 81 MG 81 MG: 81 TABLET ORAL at 12:42

## 2018-12-22 RX ADMIN — HYDROMORPHONE HYDROCHLORIDE 1 MG: 1 INJECTION, SOLUTION INTRAMUSCULAR; INTRAVENOUS; SUBCUTANEOUS at 18:41

## 2018-12-22 RX ADMIN — CLONAZEPAM 1 MG: 0.5 TABLET ORAL at 08:56

## 2018-12-22 RX ADMIN — HYDROMORPHONE HYDROCHLORIDE 1 MG: 1 INJECTION, SOLUTION INTRAMUSCULAR; INTRAVENOUS; SUBCUTANEOUS at 10:51

## 2018-12-22 RX ADMIN — GABAPENTIN 300 MG: 300 CAPSULE ORAL at 15:46

## 2018-12-22 NOTE — CONSULTS
[facility] - Consult Note    Consultation request by Johny Tovar MD for advice/opinion related to evaluating Atherosclerotic PVD with intermittent claudication [facility])    Date of  Admission: 12/20/2018  2:42 PM   Primary Care Physician:  Eliseo Hernandez MD     Assessment:     Patient Active Problem List   Diagnosis Code    Atherosclerotic PVD with intermittent claudication ([facility] I70.219    Hypokalemia E87.6    CAD (coronary artery disease) I25.10    DM (diabetes mellitus) ([facility] E11.9    HTN (hypertension) I10    Depression F32.9    Current smoker F17.200    Anemia D64.9        Plan:     There is no clinical evidence of anterior STEMI. Nevertheless, with her history of coronary stents as well as PAD, she should be on baby aspirin as well as low-dose beta-blockers if her blood pressure tolerates. Her echocardiogram is pending at this time. This will be reviewed. No new cardiac recommendations at this time except for above. History of Present Illness: This is a 48 y.o. female admitted for Atherosclerotic PVD with intermittent claudication ([facility] Patient complains of: Hypotension    Patient is a 61-year-old female who presented to the emergency room with worsening left leg pain. She had left lower extremity stent performed in April 2018. Subsequently, she had femoropopliteal bypass surgery by vascular surgery. Apparently, because postoperatively, her blood pressure was low, EKG was checked. This showed J-point elevation in anteroapical segment. Were asked for input. Patient denies any symptoms of chest pains or shortness of breath. She denies any diaphoresis. She does have history of coronary stents back in November 2017 done at [facility].  She thinks that it was placed in her right coronary artery.     Cardiac risk factors: smoking/ tobacco exposure, dyslipidemia, diabetes mellitus, hypertension      Review of Symptoms:  Except as stated above include:  Constitutional:  negative  Respiratory:  negative  Cardiovascular:  negative  Gastrointestinal: negative  Genitourinary:  negative  Musculoskeletal:  Negative  Neurological:  Negative  Dermatological:  Negative  Endocrinological: Negative  Psychological:  Negative    A comprehensive review of systems was negative except for that written in the HPI.      Past Medical History:     Past Medical History:   Diagnosis Date    Arthritis     Asthma     Depression     Hypertension     Unspecified sleep apnea          Social History:     Social History     Socioeconomic History    Marital status: LEGALLY      Spouse name: Not on file    Number of children: Not on file    Years of education: Not on file    Highest education level: Not on file   Tobacco Use    Smoking status: Current Every Day Smoker     Packs/day: 0.25    Smokeless tobacco: Current User   Substance and Sexual Activity    Alcohol use: No    Drug use: No        Family History:     Family History   Problem Relation Age of Onset    Diabetes Mother     Hypertension Mother     Heart Disease Mother         Medications:   No Known Allergies     Current Facility-Administered Medications   Medication Dose Route Frequency    HYDROmorphone (DILAUDID) syringe 1 mg  1 mg IntraVENous Q2H PRN    DOPamine (INTROPIN) 400 mg in dextrose 5% 250 mL infusion  0-10 mcg/kg/min IntraVENous TITRATE    heparin 25,000 units in D5W 250 ml infusion  18-36 Units/kg/hr IntraVENous TITRATE    ondansetron (ZOFRAN) injection 4 mg  4 mg IntraVENous Q4H PRN    NOREPINephrine (LEVOPHED) 8 mg in 0.9% NS 250ml infusion  2-16 mcg/min IntraVENous TITRATE    morphine injection 4 mg  4 mg IntraVENous Q4H PRN    naloxone (NARCAN) injection 0.4 mg  0.4 mg IntraVENous PRN    potassium chloride (K-DUR, KLOR-CON) SR tablet 40 mEq  40 mEq Oral DAILY    insulin lispro (HUMALOG) injection   SubCUTAneous Q6H    glucose chewable tablet 16 g  4 Tab Oral PRN  glucagon (GLUCAGEN) injection 1 mg  1 mg IntraMUSCular PRN    dextrose (D50) infusion 12.5-25 g  25-50 mL IntraVENous PRN    amLODIPine (NORVASC) tablet 2.5 mg  2.5 mg Oral DAILY    atorvastatin (LIPITOR) tablet 80 mg  80 mg Oral DAILY    clonazePAM (KlonoPIN) tablet 1 mg  1 mg Oral BID    clopidogrel (PLAVIX) tablet 75 mg  75 mg Oral DAILY    gabapentin (NEURONTIN) capsule 300 mg  300 mg Oral TID    losartan (COZAAR) tablet 50 mg  50 mg Oral DAILY    oxyCODONE-acetaminophen (PERCOCET) 5-325 mg per tablet 1-2 Tab  1-2 Tab Oral Q6H PRN    QUEtiapine SR (SEROquel XR) tablet 300 mg  300 mg Oral QHS    albuterol (PROVENTIL VENTOLIN) nebulizer solution 2.5 mg  2.5 mg Nebulization Q4H PRN         Physical Exam:     Visit Vitals  BP 92/76   Pulse 100   Temp 99.7 °F (37.6 °C)   Resp 23   Ht 5' 5\" (1.651 m)   Wt 74.8 kg (164 lb 14.5 oz)   SpO2 99%   BMI 27.44 kg/m²     BP Readings from Last 3 Encounters:   12/22/18 92/76   11/04/14 138/70   03/12/13 150/82     Pulse Readings from Last 3 Encounters:   12/22/18 100   11/04/14 77   03/12/13 77     Wt Readings from Last 3 Encounters:   12/21/18 74.8 kg (164 lb 14.5 oz)   11/04/14 76.2 kg (168 lb)   03/12/13 73.9 kg (163 lb)       General:  alert, cooperative, no distress, appears stated age  Neck:  no JVD  Lungs:  clear to auscultation bilaterally  Heart:  regular rate and rhythm  Abdomen:  no guarding or rigidity  Extremities:  no edema  Skin:  warm and dry  Neuro: alert, oriented x3, affect appropriate, no focal neurological deficits, moves all extremities well, no involuntary movements  Psych: non focal     Data Review:     Recent Labs     12/22/18  0255 12/21/18  1950 12/21/18  0500   WBC 5.4 6.7 3.6*   HGB 8.3* 8.7* 10.1*   HCT 26.8* 28.4* 33.5*    201 166     Recent Labs     12/22/18  0255 12/21/18  0500 12/20/18  1456   * 140 136   K 3.8 3.6 3.2*   * 107 101   CO2 28 25 30   * 100* 91   BUN 7 19* 10   CREA 0.55* 0.72 0.75   CA 7.8* 8. 0* 8.3*   MG 2.0  --   --    ALB  --   --  3.4   SGOT  --   --  37   ALT  --   --  26   INR  --  1.1 1.0       Results for orders placed or performed during the hospital encounter of 12/20/18   EKG, 12 LEAD, INITIAL   Result Value Ref Range    Ventricular Rate 65 BPM    Atrial Rate 65 BPM    P-R Interval 138 ms    QRS Duration 90 ms    Q-T Interval 458 ms    QTC Calculation (Bezet) 476 ms    Calculated P Axis 65 degrees    Calculated R Axis 36 degrees    Calculated T Axis -16 degrees    Diagnosis       Normal sinus rhythm  RSR' or QR pattern in V1 suggests right ventricular conduction delay  T wave abnormality, consider inferior ischemia  T wave abnormality, consider anterolateral ischemia  Prolonged QT  Abnormal ECG  When compared with ECG of 30-OCT-2014 12:20,  RSR' pattern in V1 is now present  Inverted T waves have replaced nonspecific T wave abnormality in Inferior   leads  QT has lengthened  Confirmed by Javad Garcia ([id number]) on 12/21/2018 11:38:07 AM         All Cardiac Markers in the last 24 hours:    Lab Results   Component Value Date/Time    CPK 1,046 (H) 12/22/2018 02:55 AM    CKMB 9.2 (H) 12/22/2018 02:55 AM    CKND1 0.9 12/22/2018 02:55 AM    TROIQ 0.05 (H) 12/22/2018 10:00 AM    TROIQ 0.03 12/22/2018 02:55 AM    TROIQ 0.06 (H) 12/21/2018 05:52 PM       Last Lipid:  No results found for: CHOL, CHOLX, CHLST, CHOLV, HDL, LDL, LDLC, DLDLP, TGLX, TRIGL, TRIGP, CHHD, CHHDX    Signed By: Reji Calle MD     December 22, 2018

## 2018-12-22 NOTE — PROGRESS NOTES
TRANSFER - IN REPORT:    Verbal report received from [facility] (name) on Shakira Rodriguez  being received from PACU (unit) for routine post - op      Report consisted of patients Situation, Background, Assessment and   Recommendations(SBAR). Information from the following report(s) SBAR, OR Summary, Procedure Summary, Intake/Output, MAR and Cardiac Rhythm sinus rhythm  was reviewed with the receiving nurse. Opportunity for questions and clarification was provided. Assessment completed upon patients arrival to unit and care assumed. 1802: TO order received from Dr. Jose Nunez for Zofran 4 mg [id number] PRN for complaints of nausea. Dr. Suki Delgadillo at bedside, orders given to maintain SBP > 100. Dopamine started anfd quickly maxed out, awaiting orders for an additional pressor. Will continue to monitor. Bedside and Verbal shift change report given to James Pulido RN (oncoming nurse) by Arthur Cortes RN   (offgoing nurse). Report included the following information SBAR, OR Summary, Procedure Summary, Intake/Output, MAR, Recent Results, Cardiac Rhythm sinus tach and Alarm Parameters .

## 2018-12-22 NOTE — PROGRESS NOTES
Physical Exam   Skin:             Rec'd pt resting in bed awake and alert, drowsy occasionally. Oriented x 4, moves all ext's with generalized weaknesss throughout. Pulses easily audible with doppler to right and left lower extremity. Feet warm /dressing dry and intact with STACI bulbs in place/ both activated. Continuing to monitor pain level, currently about a \"5\" as stated by the pt.     2100  Currently denies pain after rec'g morphine 4mg IV.denies chest pain. Pt rec'd few ice chips for dry mouth, no nausea reported. 2200  Continue to wean off pressers as tolerated by pt. EKG completed and placed on chart, no other changes. 0200  Assisted turning, Homer remain in place to left radial artery. Neuro vascular checks remain unchanged, feet warm. 5623 Updated Dr. Carlos A Anaya updated on pt status/vitals, general. Second EKG completed and placed on chart. Pt denies pain currently. Dopamine off / pt pressures continue to stabilize, will continue to monitor. Problem: Pressure Injury, Risk for  Intervention: Initiate/maintain additional skin protection/pressure relieving interventions for at risk patients with potential/actual tissue perfusion compromise  Initiate fluid immersion therapy bed, prophylactic sacral tissue compression prevention dressing (Mepilex), oxygen tubing protector, mobility assist device, and protection/pressure/off-loading devices during high risk periods incuding prevention prior to a procedure/surgery.  Transition to other suitable mattress as soon as able to mobilize patient.   12/18/18 1610   OTHER   Skin Protection/Pressure Redistribution Devices Chair cushion - foam/gel;Heel off loading with pillow(s)   Devices and Equipment   Specialty Mattress/Bed Mattress-Stage IV equivalent non-powered (e.g. Accumax without pump)         Problem: Activity Intolerance  Intervention: # Implement early mobilization  Upright posture (HOB greater than 30 degrees or dangle) for 20 minutes in the first 24 hours; Up 2-4x/day; If Bedrest: Provide passive &/or active ROM, turn Q 2 hours, facilitate upright positioning (e.g. HOB elevated)   12/18/18 1610   Activity and Safety   Activity Resting in bed   Positioning Head of bed elevated;Independent   Mobility   Level of Assistance Supervision   Head of Bed Elevation 30 degrees   Range of Motion Active;All extremities         Problem: Impaired Physical Mobility  Intervention: # Implement early mobilization  Upright posture (HOB greater than 30 degrees or dangle) for 20 minutes in the first 24 hours; Up 2-4x/day; If Bedrest: Provide passive &/or active ROM, turn Q 2 hours, facilitate upright positioning (e.g. HOB elevated)   12/18/18 1610   Activity and Safety   Activity Resting in bed   Positioning Head of bed elevated;Independent   Mobility   Level of Assistance Supervision   Head of Bed Elevation 30 degrees   Range of Motion Active;All extremities     Intervention: # Encourage patient to perform active ROM at least 2  times per day   12/18/18 1610   Mobility   Range of Motion Active;All extremities         Problem: VTE, Risk for  Intervention: # Implement/maintain early mobilization  Early mobilization is recommended including HOB up or dangle for 20 minutes during the first 24 hours; Progress activity 2-4 times/day each subsequent day, unless contraindicated.   12/18/18 1610   Activity and Safety   Activity Resting in bed   Positioning Head of bed elevated;Independent   Mobility   Head of Bed Elevation 30 degrees   Range of Motion Active;All extremities   Up with supervision of one with walker.      Problem: At Risk for Falls  Intervention: Risk for Fall Interventions (specify)  Selectively initiate interventions based on patient-specific fall risks. Document in  Associated Rows on Daily Cares.   12/18/18 1610   OTHER   Elimination Risk Interventions Urinal at bedside;Offer toileting with every interaction (patient able to identify need)   Risk for Falls Interventions   Patient's Personal Risk Factors History of falling ([place]);Problems with walking or moving;Memory loss or confusion;Potential for overestimating ability   Mobility and Gait Measures Collaborate with PT;Encourage personal mobility support item use;Mobilize (Early and progressive ambulation unless contraindicated);Use gait belt   Altered Mental Status/Unable to Participate Measures Bed/chair exit alert;Family with patient;Monitor for needs frequently;Use low height bed;Minimize distractions during ambulation;Hourly or more frequent monitoring;Supervise during toileting   Toileting Schedule Q2 hours while awake;Toileting during night as needed   Safety Measures   Environmental Safety Measures Maintained Done   Patient Specific Safety Measures in Use Encourage personal sensory support item use;Safe lighting as appropriate       Goal: # Takes action to control fall-related risks  Outcome: Outcome Met, Continue evaluating goal progress toward completion   12/18/18 3564    [place] Fall Scale   Participates in Fall Prevention Activities Yes   Family also at bedside who assists patient.    Problem: At Risk for Injury Due to Fall  Intervention: Risk for Fall-related Injury Interventions (specify)  Document in  Associated Rows or Daily Cares   12/18/18 1610   Risk for Fall-Related Injury Interventions   Patient-specific Special Conditions Anticoagulant therapy   Interventions to Prevent Special Condition Falling Inform/remind patient/family about special conditions/risks;Supervision during activities;Assistance during activities;Bed/Chair exit alarms (patient overestimates ability)         Problem: Urinary Incontinence  Intervention: # Utilize incontinence management strategies to achieve incontinence goal   12/18/18 1610   OTHER   Elimination Risk Interventions Urinal at bedside;Offer toileting with every interaction (patient able to identify need)   Risk for Falls Interventions   Toileting Schedule Q2 hours while awake;Toileting during night as needed       Goal: # Fewer urinary incontinence episodes per day  Select this goal if  goal is reduced incontinence (patient unable to control).  Outcome: Outcome Not Met, Continue to Monitor   12/18/18 1610   --GENITOURINARY--   Alterations in Voiding Incontinence-functional   Wears depends.     Problem: Delirium, Risk for  Intervention: # Implement early mobilization  Upright posture (HOB greater than 30 degrees or dangle) for 20 minutes in the first 24 hours; Up 2-4x/day; If Bedrest: Provide passive &/or active ROM, turn Q 2 hours, facilitate upright positioning (e.g. HOB elevated)   12/18/18 1610   Activity and Safety   Activity Resting in bed   Positioning Head of bed elevated;Independent   Mobility   Level of Assistance Supervision   Head of Bed Elevation 30 degrees   Range of Motion Active;All extremities     Intervention: # Implement delirium prevention strategies every shift   12/18/18 1610   Safety Measures   Patient Specific Safety  Measures in Use Encourage personal sensory support item use;Safe lighting as appropriate   Strategies (Interventions) to Prevent Delirium/Altered Mental Status   Enhance Reality Orientation Encourage participation in self care;Facilitate meaningful conversation;Promote reality orientation;Reinforce day, date, and time if disoriented   Promote Adequate Sleep  Normalize pattern of day and night lighting;Provide regular uninterrupted sleep at night;Schedule activities around sleep   Promote Adequate Hydration Offer food and fluid alternatives;Promote food and fluid intake;Provide fluids within reach

## 2018-12-22 NOTE — PROGRESS NOTES
Progress Note      Patient: Lissett Sierra               Sex: female          DOA: 12/20/2018       YOB: 1965      Age:  48 y.o.        LOS:  LOS: 2 days               Subjective / Interval Hx  I       Patient post Left popliteal and tibial endarterectomy, patch angioplasty. left FEMORAL below knee to POPLITEAL BYPASS yesterday PM. No fever. C/o pain in the middle toe . On Levophed per Vascular surgery . EKG was noted to have EKG abnormality. Patient denies CP. Cardiology consulted               Objective:      Visit Vitals  /67   Pulse 93   Temp 99.9 °F (37.7 °C)   Resp 16   Ht 5' 5\" (1.651 m)   Wt 74.8 kg (164 lb 14.5 oz)   SpO2 100%   BMI 27.44 kg/m²             Physical Exam   Constitutional: She is oriented to person, place, and time. She appears well-developed and well-nourished. HENT:   Head: Normocephalic and atraumatic. Eyes: Conjunctivae are normal.   Neck: Neck supple. Cardiovascular: Normal rate, regular rhythm and normal heart sounds. No murmur heard. Pulmonary/Chest: Effort normal and breath sounds normal.   Abdominal: Soft. Bowel sounds are normal.   Musculoskeletal: She exhibits no edema. Neurological: She is alert and oriented to person, place, and time. Skin: No rash noted. No erythema. No pallor. Psychiatric: She has a normal mood and affect. Intake and Output:  Current Shift:  No intake/output data recorded.   Last three shifts:  12/20 1901 - 12/22 0700  In: 34231.8 [I.V.:61159.8]  Out: 5273 [Urine:5395; Drains:38]    Recent Results (from the past 48 hour(s))   METABOLIC PANEL, BASIC    Collection Time: 12/20/18  1:39 PM   Result Value Ref Range    Sodium 135 (L) 136 - 145 mmol/L    Potassium 3.4 (L) 3.5 - 5.5 mmol/L    Chloride 99 (L) 100 - 108 mmol/L    CO2 28 21 - 32 mmol/L    Anion gap 8 3.0 - 18 mmol/L    Glucose 87 74 - 99 mg/dL    BUN 10 7.0 - 18 MG/DL    Creatinine 0.73 0.6 - 1.3 MG/DL    BUN/Creatinine ratio 14 12 - 20      GFR est AA >60 >60 ml/min/1.73m2    GFR est non-AA >60 >60 ml/min/1.73m2    Calcium 8.4 (L) 8.5 - 10.1 MG/DL   HGB & HCT    Collection Time: 12/20/18  1:39 PM   Result Value Ref Range    HGB 11.7 (L) 12.0 - 16.0 g/dL    HCT 38.1 35.0 - 45.0 %   TYPE & SCREEN    Collection Time: 12/20/18  1:40 PM   Result Value Ref Range    Crossmatch Expiration 12/24/2018     ABO/Rh(D) B POSITIVE     Antibody screen NEG    CBC WITH AUTOMATED DIFF    Collection Time: 12/20/18  2:56 PM   Result Value Ref Range    WBC 6.1 4.6 - 13.2 K/uL    RBC 5.47 (H) 4.20 - 5.30 M/uL    HGB 11.7 (L) 12.0 - 16.0 g/dL    HCT 37.9 35.0 - 45.0 %    MCV 69.3 (L) 74.0 - 97.0 FL    MCH 21.4 (L) 24.0 - 34.0 PG    MCHC 30.9 (L) 31.0 - 37.0 g/dL    RDW 14.5 11.6 - 14.5 %    PLATELET 695 325 - 646 K/uL    MPV 11.2 9.2 - 11.8 FL    NEUTROPHILS 65 40 - 73 %    LYMPHOCYTES 24 21 - 52 %    MONOCYTES 9 3 - 10 %    EOSINOPHILS 2 0 - 5 %    BASOPHILS 0 0 - 2 %    ABS. NEUTROPHILS 4.0 1.8 - 8.0 K/UL    ABS. LYMPHOCYTES 1.5 0.9 - 3.6 K/UL    ABS. MONOCYTES 0.5 0.05 - 1.2 K/UL    ABS. EOSINOPHILS 0.1 0.0 - 0.4 K/UL    ABS. BASOPHILS 0.0 0.0 - 0.1 K/UL    DF AUTOMATED     METABOLIC PANEL, COMPREHENSIVE    Collection Time: 12/20/18  2:56 PM   Result Value Ref Range    Sodium 136 136 - 145 mmol/L    Potassium 3.2 (L) 3.5 - 5.5 mmol/L    Chloride 101 100 - 108 mmol/L    CO2 30 21 - 32 mmol/L    Anion gap 5 3.0 - 18 mmol/L    Glucose 91 74 - 99 mg/dL    BUN 10 7.0 - 18 MG/DL    Creatinine 0.75 0.6 - 1.3 MG/DL    BUN/Creatinine ratio 13 12 - 20      GFR est AA >60 >60 ml/min/1.73m2    GFR est non-AA >60 >60 ml/min/1.73m2    Calcium 8.3 (L) 8.5 - 10.1 MG/DL    Bilirubin, total 0.8 0.2 - 1.0 MG/DL    ALT (SGPT) 26 13 - 56 U/L    AST (SGOT) 37 15 - 37 U/L    Alk.  phosphatase 66 45 - 117 U/L    Protein, total 8.4 (H) 6.4 - 8.2 g/dL    Albumin 3.4 3.4 - 5.0 g/dL    Globulin 5.0 (H) 2.0 - 4.0 g/dL    A-G Ratio 0.7 (L) 0.8 - 1.7     PROTHROMBIN TIME + INR    Collection Time: 12/20/18  2:56 PM Result Value Ref Range    Prothrombin time 13.2 11.5 - 15.2 sec    INR 1.0 0.8 - 1.2     GLUCOSE, POC    Collection Time: 12/20/18  6:21 PM   Result Value Ref Range    Glucose (POC) 159 (H) 70 - 110 mg/dL   PTT    Collection Time: 12/20/18  9:21 PM   Result Value Ref Range    aPTT 57.3 (H) 23.0 - 36.4 SEC   HEMATOCRIT    Collection Time: 12/21/18 12:00 AM   Result Value Ref Range    HCT 34.6 (L) 35.0 - 45.0 %   HEMOGLOBIN    Collection Time: 12/21/18 12:00 AM   Result Value Ref Range    HGB 10.7 (L) 12.0 - 16.0 g/dL   GLUCOSE, POC    Collection Time: 12/21/18 12:18 AM   Result Value Ref Range    Glucose (POC) 123 (H) 70 - 110 mg/dL   EKG, 12 LEAD, INITIAL    Collection Time: 12/21/18 12:21 AM   Result Value Ref Range    Ventricular Rate 65 BPM    Atrial Rate 65 BPM    P-R Interval 138 ms    QRS Duration 90 ms    Q-T Interval 458 ms    QTC Calculation (Bezet) 476 ms    Calculated P Axis 65 degrees    Calculated R Axis 36 degrees    Calculated T Axis -16 degrees    Diagnosis       Normal sinus rhythm  RSR' or QR pattern in V1 suggests right ventricular conduction delay  T wave abnormality, consider inferior ischemia  T wave abnormality, consider anterolateral ischemia  Prolonged QT  Abnormal ECG  When compared with ECG of 30-OCT-2014 12:20,  RSR' pattern in V1 is now present  Inverted T waves have replaced nonspecific T wave abnormality in Inferior   leads  QT has lengthened  Confirmed by Marissa Morris ([id number]) on 12/21/2018 11:38:07 AM     CBC WITH AUTOMATED DIFF    Collection Time: 12/21/18  5:00 AM   Result Value Ref Range    WBC 3.6 (L) 4.6 - 13.2 K/uL    RBC 4.83 4.20 - 5.30 M/uL    HGB 10.1 (L) 12.0 - 16.0 g/dL    HCT 33.5 (L) 35.0 - 45.0 %    MCV 69.4 (L) 74.0 - 97.0 FL    MCH 20.9 (L) 24.0 - 34.0 PG    MCHC 30.1 (L) 31.0 - 37.0 g/dL    RDW 14.6 (H) 11.6 - 14.5 %    PLATELET 726 895 - 188 K/uL    MPV 10.7 9.2 - 11.8 FL    NEUTROPHILS 45 40 - 73 %    LYMPHOCYTES 39 21 - 52 %    MONOCYTES 10 3 - 10 % EOSINOPHILS 5 0 - 5 %    BASOPHILS 1 0 - 2 %    ABS. NEUTROPHILS 1.6 (L) 1.8 - 8.0 K/UL    ABS. LYMPHOCYTES 1.4 0.9 - 3.6 K/UL    ABS. MONOCYTES 0.4 0.05 - 1.2 K/UL    ABS. EOSINOPHILS 0.2 0.0 - 0.4 K/UL    ABS.  BASOPHILS 0.0 0.0 - 0.1 K/UL    DF AUTOMATED     PROTHROMBIN TIME + INR    Collection Time: 12/21/18  5:00 AM   Result Value Ref Range    Prothrombin time 13.7 11.5 - 15.2 sec    INR 1.1 0.8 - 1.2     METABOLIC PANEL, BASIC    Collection Time: 12/21/18  5:00 AM   Result Value Ref Range    Sodium 140 136 - 145 mmol/L    Potassium 3.6 3.5 - 5.5 mmol/L    Chloride 107 100 - 108 mmol/L    CO2 25 21 - 32 mmol/L    Anion gap 8 3.0 - 18 mmol/L    Glucose 100 (H) 74 - 99 mg/dL    BUN 19 (H) 7.0 - 18 MG/DL    Creatinine 0.72 0.6 - 1.3 MG/DL    BUN/Creatinine ratio 26 (H) 12 - 20      GFR est AA >60 >60 ml/min/1.73m2    GFR est non-AA >60 >60 ml/min/1.73m2    Calcium 8.0 (L) 8.5 - 10.1 MG/DL   PTT    Collection Time: 12/21/18  5:00 AM   Result Value Ref Range    aPTT 106.6 (H) 23.0 - 36.4 SEC   GLUCOSE, POC    Collection Time: 12/21/18  6:09 AM   Result Value Ref Range    Glucose (POC) 92 70 - 110 mg/dL   POC ACTIVATED CLOTTING TIME    Collection Time: 12/21/18  9:35 AM   Result Value Ref Range    Activated Clotting Time (POC) 213 (H) 79 - 138 SECS   POC ACTIVATED CLOTTING TIME    Collection Time: 12/21/18 12:05 PM   Result Value Ref Range    Activated Clotting Time (POC) 202 (H) 79 - 138 SECS   POC ACTIVATED CLOTTING TIME    Collection Time: 12/21/18  1:04 PM   Result Value Ref Range    Activated Clotting Time (POC) 186 (H) 79 - 138 SECS   POC 6 PLUS    Collection Time: 12/21/18  1:26 PM   Result Value Ref Range    Sodium,  136 - 145 MMOL/L    Potassium, POC 3.5 3.5 - 5.5 MMOL/L    Chloride,  100 - 108 MMOL/L    BUN, POC 10 7 - 18 MG/DL    Glucose,  (H) 74 - 106 MG/DL    Hematocrit, POC 28 (L) 36 - 49 %    Hemoglobin, POC 9.5 (L) 12 - 16 G/DL   GLUCOSE, POC    Collection Time: 12/21/18  3:50 PM Result Value Ref Range    Glucose (POC) 151 (H) 70 - 110 mg/dL   TROPONIN I    Collection Time: 12/21/18  5:52 PM   Result Value Ref Range    Troponin-I, QT 0.06 (H) 0.0 - 0.045 NG/ML   PTT    Collection Time: 12/21/18  5:52 PM   Result Value Ref Range    aPTT 25.2 23.0 - 36.4 SEC   GLUCOSE, POC    Collection Time: 12/21/18  6:05 PM   Result Value Ref Range    Glucose (POC) 107 70 - 110 mg/dL   CBC WITH AUTOMATED DIFF    Collection Time: 12/21/18  7:50 PM   Result Value Ref Range    WBC 6.7 4.6 - 13.2 K/uL    RBC 4.18 (L) 4.20 - 5.30 M/uL    HGB 8.7 (L) 12.0 - 16.0 g/dL    HCT 28.4 (L) 35.0 - 45.0 %    MCV 67.9 (L) 74.0 - 97.0 FL    MCH 20.8 (L) 24.0 - 34.0 PG    MCHC 30.6 (L) 31.0 - 37.0 g/dL    RDW 14.6 (H) 11.6 - 14.5 %    PLATELET 973 096 - 586 K/uL    NEUTROPHILS 74 (H) 40 - 73 %    LYMPHOCYTES 17 (L) 21 - 52 %    MONOCYTES 9 3 - 10 %    EOSINOPHILS 0 0 - 5 %    BASOPHILS 0 0 - 2 %    ABS. NEUTROPHILS 5.0 1.8 - 8.0 K/UL    ABS. LYMPHOCYTES 1.1 0.9 - 3.6 K/UL    ABS. MONOCYTES 0.6 0.05 - 1.2 K/UL    ABS. EOSINOPHILS 0.0 0.0 - 0.4 K/UL    ABS. BASOPHILS 0.0 0.0 - 0.1 K/UL    DF AUTOMATED     GLUCOSE, POC    Collection Time: 12/21/18  8:32 PM   Result Value Ref Range    Glucose (POC) 166 (H) 70 - 110 mg/dL   EKG, 12 LEAD, SUBSEQUENT    Collection Time: 12/21/18  9:43 PM   Result Value Ref Range    Ventricular Rate 114 BPM    Atrial Rate 114 BPM    P-R Interval 118 ms    QRS Duration 78 ms    Q-T Interval 390 ms    QTC Calculation (Bezet) 537 ms    Calculated P Axis 71 degrees    Calculated R Axis 41 degrees    Calculated T Axis 57 degrees    Diagnosis       Sinus tachycardia  Otherwise normal ECG  When compared with ECG of 21-DEC-2018 00:21,  Vent.  rate has increased BY  49 BPM  ST elevation now present in Anterior leads  T wave inversion no longer evident in Inferior leads  T wave inversion no longer evident in Anterolateral leads     CBC WITH AUTOMATED DIFF    Collection Time: 12/22/18  2:55 AM   Result Value Ref Range    WBC 5.4 4.6 - 13.2 K/uL    RBC 3.96 (L) 4.20 - 5.30 M/uL    HGB 8.3 (L) 12.0 - 16.0 g/dL    HCT 26.8 (L) 35.0 - 45.0 %    MCV 67.7 (L) 74.0 - 97.0 FL    MCH 21.0 (L) 24.0 - 34.0 PG    MCHC 31.0 31.0 - 37.0 g/dL    RDW 14.4 11.6 - 14.5 %    PLATELET 430 275 - 213 K/uL    MPV 11.3 9.2 - 11.8 FL    NEUTROPHILS 56 40 - 73 %    LYMPHOCYTES 29 21 - 52 %    MONOCYTES 15 (H) 3 - 10 %    EOSINOPHILS 0 0 - 5 %    BASOPHILS 0 0 - 2 %    ABS. NEUTROPHILS 3.0 1.8 - 8.0 K/UL    ABS. LYMPHOCYTES 1.5 0.9 - 3.6 K/UL    ABS. MONOCYTES 0.8 0.05 - 1.2 K/UL    ABS. EOSINOPHILS 0.0 0.0 - 0.4 K/UL    ABS. BASOPHILS 0.0 0.0 - 0.1 K/UL    DF AUTOMATED     PTT    Collection Time: 12/22/18  2:55 AM   Result Value Ref Range    aPTT >180.0 (HH) 23.0 - 05.3 SEC   METABOLIC PANEL, BASIC    Collection Time: 12/22/18  2:55 AM   Result Value Ref Range    Sodium 146 (H) 136 - 145 mmol/L    Potassium 3.8 3.5 - 5.5 mmol/L    Chloride 111 (H) 100 - 108 mmol/L    CO2 28 21 - 32 mmol/L    Anion gap 7 3.0 - 18 mmol/L    Glucose 145 (H) 74 - 99 mg/dL    BUN 7 7.0 - 18 MG/DL    Creatinine 0.55 (L) 0.6 - 1.3 MG/DL    BUN/Creatinine ratio 13 12 - 20      GFR est AA >60 >60 ml/min/1.73m2    GFR est non-AA >60 >60 ml/min/1.73m2    Calcium 7.8 (L) 8.5 - 10.1 MG/DL   CARDIAC PANEL,(CK, CKMB & TROPONIN)    Collection Time: 12/22/18  2:55 AM   Result Value Ref Range    CK 1,046 (H) 26 - 192 U/L    CK - MB 9.2 (H) <3.6 ng/ml    CK-MB Index 0.9 0.0 - 4.0 %    Troponin-I, QT 0.03 0.0 - 0.045 NG/ML   MAGNESIUM    Collection Time: 12/22/18  2:55 AM   Result Value Ref Range    Magnesium 2.0 1.6 - 2.6 mg/dL   GLUCOSE, POC    Collection Time: 12/22/18  6:37 AM   Result Value Ref Range    Glucose (POC) 109 70 - 110 mg/dL       Lab/Data Reviewed: All lab results for the last 24 hours reviewed.     XRays were reviewed in past 24 hours    Medications Reviewed            Assessment/Plan     Active Problems:    Atherosclerotic PVD with intermittent claudication (HCC) (12/20/2018)      Hypokalemia (12/20/2018)      CAD (coronary artery disease) (12/20/2018)      DM (diabetes mellitus) ([facility] [address] (12/20/2018)      HTN (hypertension) (12/20/2018)      Depression (12/20/2018)      Current smoker (12/20/2018)      Anemia (12/22/2018)        Hypernatremia   Rhabdomyolysis       CARE PLAN:     PVD with Claudication   - Continue Heparin drip   - pain control as needed   - s/p Left popliteal and tibial endarterectomy, patch angioplasty.  left FEMORAL below knee to POPLITEAL BYPASS  - Patient on Levophed to keep SBP > 100 per vascular surgery  - home BP med on hold for now   - Vascular Surgery on board      CAD  - Plavix  - Losartan   - aspirin   - Lipitor      DM  - SSI   - A1c     HTN  - losartan , Norvasc , now held     Depression   - Wellbutrin , Seroquel, clonepin      Smoker   - advise cessation   - Nicotine Patch      Hypokalemia   - replace   - resolved     Hypernatremia   - Na 146 today   - switch to 1/2NS    Rhabdomyolysis   - non traumatic   - Aggressive IVF  - Trend CK     Anemia   - microcytic   - anaemia work up pending      DM Neuropathy  - Neurontin      DVT prophylaxis      Full code       Audrey Parham MD  December 22, 2018

## 2018-12-22 NOTE — OP NOTES
[facility]  ANGIOGRAPHY OPERATIVE NOTE    Date: 12/21/2018    Hospital:  [facility]  Pre-Op Dx : Bilateral lower extremity arterial insufficiency with  rest pain. Procedure :  left popliteal and tibial endarterectomy with patch angioplasty. Femoral to below knee bypass with composite vein and ptfe graft. .   Surgeon : Sussy Woody MD  Assistant: None  Anesthesia : General  Findings :  1) Patent SFA artery. Patent IVETH. Occluded TPT. 2) Patent, graft with palpable IVETH pulse at end of procedure. Complications: None  EBL : 750mL      Procedure:  After informed consent was obtained, the patient was taken to the OR room and laidsupine on the operating table. After the placement of monitoring devices, conscious sedation was administered. Both groins were prepped and draped in normal sterile fashion. A surgical time-out was performed. The patient was identified and the procedure verified. An incision was made on left groin and soft tissue dissected down to femoral sheath. The left femoral artery was circumferentially dissected from surrounding tissue and vessel loops were placed for proximal and distal control. The below knee popliteal artery was then dissected after an incision was made in the proximal calf at the posterior compartment. Surrounding veins were ligated or clipped and proximal and distal control was obtained with vessel loops. I dissected down to the IVETH and TPT. I noted this was all occluded. I got proximal and distal control with vessel loops and open the popliteal artery. I then extended my arteriotomy to the TPT where I found good lumen. We then performed focal endarterectomy and used a 1 x 6 cm patch for the patch angioplasty of popliteal and Tibio peroneal trunk into PTA. At the same time the right GSV was dissected from all surrounding tissue and removed from SFJ to mid thigh due to small caliber.  I then noted that half of vein had come sclerotic areas and was stenotic. A gore tunneler was used to create a tunnel from the femoral artery and popliteal artery. below the skin. IV heparin  was administered at a dose of  10 thousand units. Proximal and distal control of the femoral artery was obtained. An arteriotomy was made and extended superiorly  with Briscoe scissors. The proximal vein was spatulated to fit arteriotomy. Using 6-0 prolene in the usual parachute technique the proximal anastomosis was performed. Distal and proximal clamps were release confirming strong pulse in vein graft. All bleeding areas were sutured with 6-0 prolene repair in single fashion. I then used the urosil valvulotomy to remove all valves from vein. I the spatulated a 6 mm ptfe graft and created and anastomosis with the GSV. The anterior aspect vein was marked for tunneling. The vein was advance through the tunneler watching for twisting or kinking. Tunneler was removed and vein confirmed to have strong pulse without signs of kinking or twisting. Thepopliteal artery was controlled with vessel loops and the graft was clamped with a bulldog clamp. An arteriotomy was made in the popliteal vessel and extended superiorly  with Briscoe scissors. Using 6-0 prolene in the usual parachute technique the proximal anastomosis was performed. Distal and proximal clamps were release confirming strong pulse in vein graft. All bleeding areas were sutured with 7-0 prolene repair in single fashion. Strong pulse was appreciated and doppler signal augmentation was observed after vein was clamped and released. All incision were irrigated and thrombin gel foam was used for hemostasis. Incision were approximated in multiple layers in the usual fashion and skin closed with staples and then covered with glue. The patient was sent to the recovery area in stable condition and no immediate complications to the procedure were observed.     Krishan Poole MD

## 2018-12-22 NOTE — PROGRESS NOTES
[facility]  [address], [address] [facility]   Dr. Lori Brewster, Dr. Lorenzo Gary, Dr. Jeff Shahid, & Dr. Yogi Chan  [phone] FAX# [phone]    Progress Note    Patient: Gavin Mitchell MRN: [id number]  SSN: [id number]    YOB: 1965  Age: 48 y.o. Sex: female      Admit Date: 12/20/2018    LOS: 2 days     Subjective:     POD 1 from left Popliteal/tibial endarterectomy with patch angioplasty and Fem pop bypass. Objective:     Vitals:    12/22/18 0813 12/22/18 0814 12/22/18 0815 12/22/18 0816   BP:       Pulse: 90 89 89 93   Resp: 17 16 17 16   Temp:       SpO2: 100% 100% 100% 100%   Weight:       Height:            Intake and Output:  Current Shift: No intake/output data recorded. Last three shifts: 12/20 1901 - 12/22 0700  In: 10227.5 [I.V.:19735.5]  Out: 6183 [Urine:5395; Drains:38]    Physical Exam:   GENERAL: alert, cooperative, no distress, appears stated age  EYE: conjunctivae/corneas clear. PERRL, EOM's intact. Fundi benign  LYMPHATIC: Cervical, supraclavicular, and axillary nodes normal.   THROAT & NECK: normal and no erythema or exudates noted. LUNG: clear to auscultation bilaterally  HEART: regular rate and rhythm, S1, S2 normal, no murmur, click, rub or gallop  ABDOMEN: soft, non-tender. Bowel sounds normal. No masses,  no organomegaly  EXTREMITIES:  RLE c/d/i wound. LLE palpable DP/PT. Doppler graft pulse. Decrease sensation at toes. Motor intact. NEUROLOGIC: AOx3. Gait normal. Reflexes and motor strength normal and symmetric. Cranial nerves 2-12 and sensation grossly intact.   PSYCHIATRIC: non focal    Lab/Data Review:  BMP:   Lab Results   Component Value Date/Time     (H) 12/22/2018 02:55 AM    K 3.8 12/22/2018 02:55 AM     (H) 12/22/2018 02:55 AM    CO2 28 12/22/2018 02:55 AM    AGAP 7 12/22/2018 02:55 AM     (H) 12/22/2018 02:55 AM    BUN 7 12/22/2018 02:55 AM    CREA 0.55 (L) 12/22/2018 02:55 AM    GFRAA >60 12/22/2018 02:55 AM    GFRNA >60 12/22/2018 02:55 AM     CBC:   Lab Results   Component Value Date/Time    WBC 5.4 12/22/2018 02:55 AM    HGB 8.3 (L) 12/22/2018 02:55 AM    HCT 26.8 (L) 12/22/2018 02:55 AM     12/22/2018 02:55 AM            Assessment:     Active Problems:    Atherosclerotic PVD with intermittent claudication ([facility] [address] (12/20/2018)      Hypokalemia (12/20/2018)      CAD (coronary artery disease) (12/20/2018)      DM (diabetes mellitus) ([facility] [address] (12/20/2018)      HTN (hypertension) (12/20/2018)      Depression (12/20/2018)      Current smoker (12/20/2018)      Anemia (12/22/2018)        Plan:     Progress diet  Echo today. OOB to chair. PT/OT. Plavix ad heparin for Cardiac and bypass.     Signed By: Linh Carrillo MD     December 22, 2018

## 2018-12-22 NOTE — ANESTHESIA POSTPROCEDURE EVALUATION
Procedure(s):  left FEMORAL below knee to POPLITEAL BYPASS.     Anesthesia Post Evaluation      Multimodal analgesia: multimodal analgesia used between 6 hours prior to anesthesia start to PACU discharge  Patient location during evaluation: bedside  Patient participation: complete - patient participated  Level of consciousness: awake  Pain score: 4  Pain management: adequate  Airway patency: patent  Anesthetic complications: no  Cardiovascular status: stable  Respiratory status: acceptable  Hydration status: acceptable  Post anesthesia nausea and vomiting:  controlled      Visit Vitals  BP (!) 88/52   Pulse (!) 105   Temp 37.1 °C (98.8 °F)   Resp 13   Ht 5' 5\" (1.651 m)   Wt 74.8 kg (165 lb)   SpO2 100%   BMI 27.46 kg/m²

## 2018-12-22 NOTE — BRIEF OP NOTE
BRIEF OPERATIVE NOTE    Date of Procedure: 12/21/2018   Preoperative Diagnosis: i73.9  Postoperative Diagnosis: i73.9    Procedure(s):  Left popliteal and tibial endarterectomy, patch angioplasty. left FEMORAL below knee to POPLITEAL BYPASS  Surgeon(s) and Role:     * Venita Rutherford MD - Primary          Surgical Staff:  Circ-1: Willem Camara RN  Circ-Relief: Ricki Keys RN; Gallo Pratt  Scrub Tech-1: Alessandra Ramachandran  Scrub Tech-Relief: Sandra León  Surg Asst-1: Phyllis Estrada  Surg Asst-Relief: Parry Spatz  Event Time In Time Out   Incision Start 12/21/2018 0829    Incision Close 12/21/2018 1535      Anesthesia: General   Estimated Blood Loss: 750mL  Specimens: * No specimens in log *   Findings: patent bypass with DOppler DP/PT pulse. Complications: none  Implants:   Implant Name Type Inv.  Item Serial No.  Lot No. LRB No. Used Action   PATCH VASC BIO XENOSURE 1X6CM --  -   PATCH VASC BIO XENOSURE 1X6CM --  0000 [facility] VASCULAR [facility] [id number] Right 1 Implanted   Flixene Vascular Graft 6mm x50cm   [id number] [email or web address] VASC REF# [id number] Right 1 Implanted

## 2018-12-22 NOTE — PROGRESS NOTES
46 - [facility] dual skin assessment performed and double RN verified, VSS on Levophed and Heparin gtt, will continue to monitor    1000 - Sitting up in bed, eating breakfast, tolerating well    1200 - Resting in bed quietly, VSS on Levophed and Heparin gtt    1300 - Eating lunch in bed, tolerating well, VSS on Levophed, Heparin, and 3L NC, will continue to monitor    1600 - Elevated temperature noted, ice packs applied to under arms, Dr. Shoaib Osullivan pagebonifacio for PRN Tylenol order, awaiting call back, family present at bedside    1910 - Bedside and Verbal shift change report given to [facility] (oncoming nurse) by Reinier Lozano RN (offgoing nurse). Report included the following information SBAR, Kardex, Intake/Output, MAR, Recent Results and Cardiac Rhythm NSR.

## 2018-12-23 LAB
ANION GAP SERPL CALC-SCNC: 6 MMOL/L (ref 3–18)
APTT PPP: 56.9 SEC (ref 23–36.4)
APTT PPP: 68.7 SEC (ref 23–36.4)
APTT PPP: 80.5 SEC (ref 23–36.4)
BASOPHILS # BLD: 0 K/UL (ref 0–0.1)
BASOPHILS NFR BLD: 0 % (ref 0–2)
BUN SERPL-MCNC: 7 MG/DL (ref 7–18)
BUN/CREAT SERPL: 13 (ref 12–20)
CALCIUM SERPL-MCNC: 7.6 MG/DL (ref 8.5–10.1)
CHLORIDE SERPL-SCNC: 106 MMOL/L (ref 100–108)
CK SERPL-CCNC: 668 U/L (ref 26–192)
CO2 SERPL-SCNC: 28 MMOL/L (ref 21–32)
CREAT SERPL-MCNC: 0.55 MG/DL (ref 0.6–1.3)
DIFFERENTIAL METHOD BLD: ABNORMAL
ECHO AV PEAK GRADIENT: 0 MMHG
ECHO AV PEAK VELOCITY: 0 CM/S
ECHO LA VOL 2C: 65.52 ML (ref 22–52)
ECHO LA VOL 4C: 53.28 ML (ref 22–52)
ECHO LA VOLUME INDEX A2C: 36.04 ML/M2 (ref 16–28)
ECHO LA VOLUME INDEX A4C: 29.3 ML/M2 (ref 16–28)
ECHO LV EDV A2C: 105.3 ML
ECHO LV EDV A4C: 84.9 ML
ECHO LV EDV BP: 99.9 ML (ref 56–104)
ECHO LV EDV INDEX A4C: 46.7 ML/M2
ECHO LV EDV INDEX BP: 54.9 ML/M2
ECHO LV EDV NDEX A2C: 57.9 ML/M2
ECHO LV EJECTION FRACTION A2C: 46 %
ECHO LV EJECTION FRACTION A4C: 57 %
ECHO LV EJECTION FRACTION BIPLANE: 52.8 % (ref 55–100)
ECHO LV ESV A2C: 57.3 ML
ECHO LV ESV A4C: 36.2 ML
ECHO LV ESV BP: 47.2 ML (ref 19–49)
ECHO LV ESV INDEX A2C: 31.5 ML/M2
ECHO LV ESV INDEX A4C: 19.9 ML/M2
ECHO LV ESV INDEX BP: 26 ML/M2
ECHO LV INTERNAL DIMENSION DIASTOLIC: 4.07 CM (ref 3.9–5.3)
ECHO LV INTERNAL DIMENSION SYSTOLIC: 3.08 CM
ECHO LV IVSD: 1.05 CM (ref 0.6–0.9)
ECHO LV MASS 2D: 160 G (ref 67–162)
ECHO LV MASS INDEX 2D: 88 G/M2 (ref 43–95)
ECHO LV POSTERIOR WALL DIASTOLIC: 1.04 CM (ref 0.6–0.9)
ECHO LVOT DIAM: 1.95 CM
ECHO LVOT PEAK GRADIENT: 5.8 MMHG
ECHO LVOT PEAK VELOCITY: 120.65 CM/S
ECHO MV A VELOCITY: 114.91 CM/S
ECHO MV AREA PHT: 5.9 CM2
ECHO MV E DECELERATION TIME (DT): 128 MS
ECHO MV E VELOCITY: 0.85 CM/S
ECHO MV E/A RATIO: 0.01
ECHO MV PRESSURE HALF TIME (PHT): 37.1 MS
EOSINOPHIL # BLD: 0.1 K/UL (ref 0–0.4)
EOSINOPHIL NFR BLD: 1 % (ref 0–5)
ERYTHROCYTE [DISTWIDTH] IN BLOOD BY AUTOMATED COUNT: 15 % (ref 11.6–14.5)
GLUCOSE BLD STRIP.AUTO-MCNC: 101 MG/DL (ref 70–110)
GLUCOSE BLD STRIP.AUTO-MCNC: 115 MG/DL (ref 70–110)
GLUCOSE BLD STRIP.AUTO-MCNC: 126 MG/DL (ref 70–110)
GLUCOSE BLD STRIP.AUTO-MCNC: 129 MG/DL (ref 70–110)
GLUCOSE SERPL-MCNC: 112 MG/DL (ref 74–99)
HCT VFR BLD AUTO: 24.3 % (ref 35–45)
HCT VFR BLD AUTO: 33 % (ref 35–45)
HGB BLD-MCNC: 10.2 G/DL (ref 12–16)
HGB BLD-MCNC: 7.2 G/DL (ref 12–16)
LACTATE SERPL-SCNC: 0.8 MMOL/L (ref 0.4–2)
LYMPHOCYTES # BLD: 2 K/UL (ref 0.9–3.6)
LYMPHOCYTES NFR BLD: 26 % (ref 21–52)
MCH RBC QN AUTO: 20.6 PG (ref 24–34)
MCHC RBC AUTO-ENTMCNC: 29.6 G/DL (ref 31–37)
MCV RBC AUTO: 69.6 FL (ref 74–97)
MONOCYTES # BLD: 0.8 K/UL (ref 0.05–1.2)
MONOCYTES NFR BLD: 10 % (ref 3–10)
NEUTS SEG # BLD: 4.8 K/UL (ref 1.8–8)
NEUTS SEG NFR BLD: 63 % (ref 40–73)
PLATELET # BLD AUTO: 271 K/UL (ref 135–420)
PMV BLD AUTO: 11.1 FL (ref 9.2–11.8)
POTASSIUM SERPL-SCNC: 3.8 MMOL/L (ref 3.5–5.5)
RBC # BLD AUTO: 3.49 M/UL (ref 4.2–5.3)
SODIUM SERPL-SCNC: 140 MMOL/L (ref 136–145)
WBC # BLD AUTO: 7.6 K/UL (ref 4.6–13.2)

## 2018-12-23 PROCEDURE — 82962 GLUCOSE BLOOD TEST: CPT

## 2018-12-23 PROCEDURE — 85025 COMPLETE CBC W/AUTO DIFF WBC: CPT

## 2018-12-23 PROCEDURE — 80048 BASIC METABOLIC PNL TOTAL CA: CPT

## 2018-12-23 PROCEDURE — 77010033678 HC OXYGEN DAILY

## 2018-12-23 PROCEDURE — 94640 AIRWAY INHALATION TREATMENT: CPT

## 2018-12-23 PROCEDURE — 85730 THROMBOPLASTIN TIME PARTIAL: CPT

## 2018-12-23 PROCEDURE — 82550 ASSAY OF CK (CPK): CPT

## 2018-12-23 PROCEDURE — 36600 WITHDRAWAL OF ARTERIAL BLOOD: CPT

## 2018-12-23 PROCEDURE — 74011250637 HC RX REV CODE- 250/637: Performed by: INTERNAL MEDICINE

## 2018-12-23 PROCEDURE — 87040 BLOOD CULTURE FOR BACTERIA: CPT

## 2018-12-23 PROCEDURE — 74011250636 HC RX REV CODE- 250/636: Performed by: FAMILY MEDICINE

## 2018-12-23 PROCEDURE — 74011000250 HC RX REV CODE- 250: Performed by: SURGERY

## 2018-12-23 PROCEDURE — P9016 RBC LEUKOCYTES REDUCED: HCPCS

## 2018-12-23 PROCEDURE — 74011250636 HC RX REV CODE- 250/636: Performed by: SURGERY

## 2018-12-23 PROCEDURE — 74011000258 HC RX REV CODE- 258: Performed by: FAMILY MEDICINE

## 2018-12-23 PROCEDURE — 85018 HEMOGLOBIN: CPT

## 2018-12-23 PROCEDURE — 36430 TRANSFUSION BLD/BLD COMPNT: CPT

## 2018-12-23 PROCEDURE — 74011250637 HC RX REV CODE- 250/637: Performed by: FAMILY MEDICINE

## 2018-12-23 PROCEDURE — 83605 ASSAY OF LACTIC ACID: CPT

## 2018-12-23 PROCEDURE — 74011000250 HC RX REV CODE- 250: Performed by: FAMILY MEDICINE

## 2018-12-23 PROCEDURE — 36415 COLL VENOUS BLD VENIPUNCTURE: CPT

## 2018-12-23 PROCEDURE — 65610000006 HC RM INTENSIVE CARE

## 2018-12-23 RX ORDER — INSULIN LISPRO 100 [IU]/ML
INJECTION, SOLUTION INTRAVENOUS; SUBCUTANEOUS
Status: DISCONTINUED | OUTPATIENT
Start: 2018-12-23 | End: 2019-01-01 | Stop reason: HOSPADM

## 2018-12-23 RX ORDER — LEVOFLOXACIN 5 MG/ML
750 INJECTION, SOLUTION INTRAVENOUS EVERY 24 HOURS
Status: DISCONTINUED | OUTPATIENT
Start: 2018-12-23 | End: 2018-12-26 | Stop reason: SDUPTHER

## 2018-12-23 RX ORDER — FUROSEMIDE 10 MG/ML
20 INJECTION INTRAMUSCULAR; INTRAVENOUS ONCE
Status: COMPLETED | OUTPATIENT
Start: 2018-12-23 | End: 2018-12-23

## 2018-12-23 RX ORDER — ALBUTEROL SULFATE 0.83 MG/ML
2.5 SOLUTION RESPIRATORY (INHALATION)
Status: DISCONTINUED | OUTPATIENT
Start: 2018-12-23 | End: 2018-12-23 | Stop reason: SDUPTHER

## 2018-12-23 RX ORDER — SODIUM CHLORIDE 9 MG/ML
250 INJECTION, SOLUTION INTRAVENOUS AS NEEDED
Status: DISCONTINUED | OUTPATIENT
Start: 2018-12-23 | End: 2018-12-30

## 2018-12-23 RX ADMIN — CLOPIDOGREL BISULFATE 75 MG: 75 TABLET, FILM COATED ORAL at 10:06

## 2018-12-23 RX ADMIN — ATORVASTATIN CALCIUM 80 MG: 40 TABLET, FILM COATED ORAL at 10:06

## 2018-12-23 RX ADMIN — OXYCODONE AND ACETAMINOPHEN 2 TABLET: 5; 325 TABLET ORAL at 17:34

## 2018-12-23 RX ADMIN — HEPARIN SODIUM AND DEXTROSE 19 UNITS/KG/HR: 10000; 5 INJECTION INTRAVENOUS at 17:33

## 2018-12-23 RX ADMIN — HYDROMORPHONE HYDROCHLORIDE 1 MG: 1 INJECTION, SOLUTION INTRAMUSCULAR; INTRAVENOUS; SUBCUTANEOUS at 01:16

## 2018-12-23 RX ADMIN — ACETAMINOPHEN 650 MG: 650 TABLET, FILM COATED, EXTENDED RELEASE ORAL at 06:29

## 2018-12-23 RX ADMIN — NOREPINEPHRINE BITARTRATE 6 MCG/MIN: 1 INJECTION INTRAVENOUS at 23:29

## 2018-12-23 RX ADMIN — GABAPENTIN 300 MG: 300 CAPSULE ORAL at 22:17

## 2018-12-23 RX ADMIN — HEPARIN SODIUM AND DEXTROSE 21 UNITS/KG/HR: 10000; 5 INJECTION INTRAVENOUS at 18:03

## 2018-12-23 RX ADMIN — ALBUTEROL SULFATE 2.5 MG: 2.5 SOLUTION RESPIRATORY (INHALATION) at 17:27

## 2018-12-23 RX ADMIN — POTASSIUM CHLORIDE 40 MEQ: 20 TABLET, EXTENDED RELEASE ORAL at 10:06

## 2018-12-23 RX ADMIN — ASPIRIN 81 MG 81 MG: 81 TABLET ORAL at 10:07

## 2018-12-23 RX ADMIN — CLONAZEPAM 1 MG: 0.5 TABLET ORAL at 17:34

## 2018-12-23 RX ADMIN — CEFTRIAXONE 1 G: 1 INJECTION, POWDER, FOR SOLUTION INTRAMUSCULAR; INTRAVENOUS at 22:07

## 2018-12-23 RX ADMIN — LEVOFLOXACIN 750 MG: 5 INJECTION, SOLUTION INTRAVENOUS at 23:34

## 2018-12-23 RX ADMIN — FUROSEMIDE 20 MG: 10 INJECTION, SOLUTION INTRAMUSCULAR; INTRAVENOUS at 12:28

## 2018-12-23 RX ADMIN — CLONAZEPAM 1 MG: 0.5 TABLET ORAL at 10:06

## 2018-12-23 RX ADMIN — GABAPENTIN 300 MG: 300 CAPSULE ORAL at 15:17

## 2018-12-23 RX ADMIN — HYDROMORPHONE HYDROCHLORIDE 1 MG: 1 INJECTION, SOLUTION INTRAMUSCULAR; INTRAVENOUS; SUBCUTANEOUS at 15:17

## 2018-12-23 RX ADMIN — GABAPENTIN 300 MG: 300 CAPSULE ORAL at 10:06

## 2018-12-23 NOTE — PROGRESS NOTES
[address] [facility] Dr. Tammi Savage by to evaluate patient. Aware that patient sounds coarse. Updated on patient status. Awaiting CXR results. Orders received for Lasix 20mg IVP x 1 now---see MAR for administration details. Orders received for FOBT and PRN albuterol. 1430 Dr. Prieto Drafts by to evaluate patient. Updated on patient status. Neurovascular check done. Orders received to reinforce dressing. Plans to take down tomorrow. 1808 Pt. Tachycardic (sinus) 100s-110s, SBP 100s on 2 of Levo, tachypneic 25-40s, temp 38.2C, diaphoretic. Breathing tx. Done by RT. Notified Dr. Tammi Savage of possible sepsis. Orders received for blood cultures x 2, lactacte, rocephin 1gm Qday 1st dose now, Levaquin Qday 1st dose now. CXR orders reviewed.

## 2018-12-23 NOTE — PROGRESS NOTES
Problem: Falls - Risk of  Goal: *Absence of Falls  Document Kiko Fall Risk and appropriate interventions in the flowsheet. Outcome: Progressing Towards Goal  Fall Risk Interventions:  Mobility Interventions: Bed/chair exit alarm, Patient to call before getting OOB, Strengthening exercises (ROM-active/passive)         Medication Interventions: Bed/chair exit alarm, Assess postural VS orthostatic hypotension, Evaluate medications/consider consulting pharmacy, Teach patient to arise slowly    Elimination Interventions: Bed/chair exit alarm, Call light in reach, Patient to call for help with toileting needs, Toileting schedule/hourly rounds             Problem: Pressure Injury - Risk of  Goal: *Prevention of pressure injury  Document Carlo Scale and appropriate interventions in the flowsheet. Outcome: Progressing Towards Goal  Pressure Injury Interventions:  Sensory Interventions: Check visual cues for pain, Float heels, Keep linens dry and wrinkle-free, Maintain/enhance activity level, Minimize linen layers, Monitor skin under medical devices, Pad between skin to skin, Pressure redistribution bed/mattress (bed type), Turn and reposition approx. every two hours (pillows and wedges if needed)    Moisture Interventions: Absorbent underpads, Minimize layers, Offer toileting Q_hr(2)    Activity Interventions: Assess need for specialty bed, Pressure redistribution bed/mattress(bed type)    Mobility Interventions: HOB 30 degrees or less, Turn and reposition approx.  every two hours(pillow and wedges)    Nutrition Interventions: Document food/fluid/supplement intake, Offer support with meals,snacks and hydration    Friction and Shear Interventions: Feet elevated on foot rest, HOB 30 degrees or less, Lift sheet, Lift team/patient mobility team, Minimize layers, Transferring/repositioning devices

## 2018-12-23 NOTE — PROGRESS NOTES
Verbal report given to Trell Prince RN who will assume care of Alex Wilson . Report consisted of patients Situation, Background, Assessment and   Recommendations(SBAR). Information from the following report(s) SBAR, Kardex, Procedure Summary, Intake/Output, MAR, Recent Results and Cardiac Rhythm ST  was reviewed with the receiving nurse. Opportunity for questions and clarification was provided.

## 2018-12-23 NOTE — PROGRESS NOTES
[facility]  -  Progress Note      Patient: Rahat Sierra MRN: [id number]  SSN: [id number]    YOB: 1965  Age: 48 y.o. Sex: female      Admit Date: 12/20/2018    Assessment:     Hospital Problems  Date Reviewed: 11/4/2014          Codes Class Noted POA    Anemia ICD-10-CM: D64.9  ICD-9-CM: 285.9  12/22/2018 Unknown        Atherosclerotic PVD with intermittent claudication ([facility] ICD-10-CM: I70.219  ICD-9-CM: 440.21  12/20/2018 Unknown        Hypokalemia ICD-10-CM: E87.6  ICD-9-CM: 276.8  12/20/2018 Yes        CAD (coronary artery disease) (Chronic) ICD-10-CM: I25.10  ICD-9-CM: 414.00  12/20/2018 Unknown        DM (diabetes mellitus) ([facility] (Chronic) ICD-10-CM: E11.9  ICD-9-CM: 250.00  12/20/2018 Unknown        HTN (hypertension) (Chronic) ICD-10-CM: I10  ICD-9-CM: 401.9  12/20/2018 Unknown        Depression (Chronic) ICD-10-CM: F32.9  ICD-9-CM: 323  12/20/2018 Unknown        Current smoker ICD-10-CM: F17.200  ICD-9-CM: 305.1  12/20/2018 Yes              Plan:     Echocardiogram shows normal LV function without regional wall motion abnormality. No evidence of ACS. Would resume outpatient cardiac medication as tolerated. Increase activity as tolerated. Initial hypotension was likely due to volume contraction. With hydration, her blood pressure is better. Of note, her hemoglobin has fallen from 11.7 now down to 7.5 with volume expansion partly playing a role. Subjective:     No new complaints.      Objective:      Patient Vitals for the past 8 hrs:   Temp Pulse Resp BP SpO2   12/23/18 0906 99 °F (37.2 °C) 81 19 106/62 100 %   12/23/18 0843 99.1 °F (37.3 °C) 78 20 129/67 100 %   12/23/18 0823 99.1 °F (37.3 °C) 79 16 119/73 100 %   12/23/18 0730  (!) 109 25 (!) 83/62 100 %   12/23/18 0700  96 24 103/64 99 %   12/23/18 0630  94 20 103/63 98 %   12/23/18 0600  91 24 115/65 99 %   12/23/18 0530  87 20 125/69 99 %   12/23/18 0500  85 19 111/65 100 %   12/23/18 0430  89 19 124/61 99 %   12/23/18 0400 98.9 °F (37.2 °C) 82 18 97/60 98 %   12/23/18 0330  82 17 109/57 95 %   12/23/18 0300  81 22 (!) 80/47 98 %   12/23/18 0230  79 16 100/51 98 %         Patient Vitals for the past 96 hrs:   Weight   12/22/18 1131 74.4 kg (164 lb)   12/21/18 2000 74.8 kg (164 lb 14.5 oz)   12/20/18 1512 74.8 kg (165 lb)         Intake/Output Summary (Last 24 hours) at 12/23/2018 1028  Last data filed at 12/23/2018 0700  Gross per 24 hour   Intake 840.32 ml   Output 1320 ml   Net -479.68 ml       Physical Exam:  General:  alert, cooperative, no distress, appears stated age  Neck:  no JVD  Lungs:  clear to auscultation bilaterally  Heart:  regular rate and rhythm  Abdomen:  no guarding or rigidity  Extremities:  no edema    Data Review:     Labs: Results:       Chemistry Recent Labs     12/23/18  0300 12/22/18  0255 12/21/18  0500 12/20/18  1456   * 145* 100* 91    146* 140 136   K 3.8 3.8 3.6 3.2*    111* 107 101   CO2 28 28 25 30   BUN 7 7 19* 10   CREA 0.55* 0.55* 0.72 0.75   CA 7.6* 7.8* 8.0* 8.3*   MG  --  2.0  --   --    AGAP 6 7 8 5   BUCR 13 13 26* 13   AP  --   --   --  66   TP  --   --   --  8.4*   ALB  --   --   --  3.4   GLOB  --   --   --  5.0*   AGRAT  --   --   --  0.7*      CBC w/Diff Recent Labs     12/23/18  0300 12/22/18  0255 12/21/18  1950   WBC 7.6 5.4 6.7   RBC 3.49* 3.96* 4.18*   HGB 7.2* 8.3* 8.7*   HCT 24.3* 26.8* 28.4*    228 201   GRANS 63 56 74*   LYMPH 26 29 17*   EOS 1 0 0      Cardiac Enzymes Lab Results   Component Value Date/Time     (H) 12/23/2018 03:00 AM      Coagulation Recent Labs     12/23/18  0300 12/22/18  2300  12/21/18  0500  12/20/18  1456   PTP  --   --   --  13.7  --  13.2   INR  --   --   --  1.1  --  1.0   APTT 80.5* 113.3*   < > 106.6*   < >  --     < > = values in this interval not displayed.        Lipid Panel No results found for: CHOL, CHOLPOCT, CHOLX, CHLST, CHOLV, 157478, HDL, LDL, LDLC, DLDLP, 171006, VLDLC, VLDL, TGLX, TRIGL, TRIGP, TGLPOCT, CHHD, CHHDX   BNP No results found for: BNP, BNPP, XBNPT   Liver Enzymes Recent Labs     12/20/18  1456   TP 8.4*   ALB 3.4   AP 66   SGOT 37      Digoxin    Thyroid Studies No results found for: T4, T3U, TSH, TSHEXT

## 2018-12-23 NOTE — PROGRESS NOTES
Pt resting, easily awakened, denies pain. VSS. Assisted turning, encouraged pt for TCDB. Has productive cough, coarse breath sounds. 02 sats 97 range. [address] placed to right lower arm without difficulty. Current IV dc'd was in pt bend of arm, continuously saying blocked. Vascular checks to lower ext's - both with positive dp and pt. Feet warm. 0100  Updated Dr. Lala Rosa of pt oozing at surgical site to left leg groin site. New orders rec'd. 0200  Dressing removed, has small oozing by staples to anterior thigh near groin site. Replaced with 4x4's from ant to post site, then silk tape. Will continue to monitor. 0645  Left dressomg site appear to be oozing from groin area. Not draining beyong new dressing, though can see through silk tape is becoming damp. Reinforced dressing with abd gauze and silk tape. Site appear now dry intact, good pulses remain bilaterally to upper and lower ext's. 0730  Updated Dr. Mirian Zimmerman on pt status/ no new orders rec'd at this time.

## 2018-12-23 NOTE — PROGRESS NOTES
Progress Note      Patient: Yamel Carias               Sex: female          DOA: 12/20/2018       YOB: 1965      Age:  48 y.o.        LOS:  LOS: 3 days               Subjective / Interval Hx  I       Patient post Left popliteal and tibial endarterectomy, patch angioplasty. left FEMORAL below knee to POPLITEAL BYPASS yesterday PM. No fever. C/o pain in the middle toe . On Levophed per Vascular surgery . EKG was noted to have EKG abnormality. Patient denies CP.    12/23 : Episodes of hypotension noted , now resolved . On pressors Levophed. Continue to hold BP medications               Objective:      Visit Vitals  /85   Pulse 89   Temp 99 °F (37.2 °C)   Resp 22   Ht 5' 5\" (1.651 m)   Wt 74.4 kg (164 lb)   SpO2 99%   BMI 27.29 kg/m²             Physical Exam   Constitutional: She is oriented to person, place, and time. She appears well-developed and well-nourished. HENT:   Head: Normocephalic and atraumatic. Eyes: Conjunctivae are normal.   Neck: Neck supple. Cardiovascular: Normal rate, regular rhythm and normal heart sounds. No murmur heard. Pulmonary/Chest: Effort normal. She has rales. Abdominal: Soft. Bowel sounds are normal.   Musculoskeletal: She exhibits no edema. Neurological: She is alert and oriented to person, place, and time. Skin: No rash noted. No erythema. No pallor. Psychiatric: She has a normal mood and affect. Intake and Output:  Current Shift:  12/23 0701 - 12/23 1900  In: 379.1 [I.V.:47.8]  Out: -   Last three shifts:  12/21 1901 - 12/23 0700  In: 2199.9 [P.O.:350;  I.V.:1849.9]  Out: 0472 [DFWIA:5789; Drains:63]    Recent Results (from the past 48 hour(s))   POC ACTIVATED CLOTTING TIME    Collection Time: 12/21/18 12:05 PM   Result Value Ref Range    Activated Clotting Time (POC) 202 (H) 79 - 138 SECS   POC ACTIVATED CLOTTING TIME    Collection Time: 12/21/18  1:04 PM   Result Value Ref Range    Activated Clotting Time (POC) 186 (H) 79 - 138 SECS POC 6 PLUS    Collection Time: 12/21/18  1:26 PM   Result Value Ref Range    Sodium,  136 - 145 MMOL/L    Potassium, POC 3.5 3.5 - 5.5 MMOL/L    Chloride,  100 - 108 MMOL/L    BUN, POC 10 7 - 18 MG/DL    Glucose,  (H) 74 - 106 MG/DL    Hematocrit, POC 28 (L) 36 - 49 %    Hemoglobin, POC 9.5 (L) 12 - 16 G/DL   GLUCOSE, POC    Collection Time: 12/21/18  3:50 PM   Result Value Ref Range    Glucose (POC) 151 (H) 70 - 110 mg/dL   TROPONIN I    Collection Time: 12/21/18  5:52 PM   Result Value Ref Range    Troponin-I, QT 0.06 (H) 0.0 - 0.045 NG/ML   PTT    Collection Time: 12/21/18  5:52 PM   Result Value Ref Range    aPTT 25.2 23.0 - 36.4 SEC   GLUCOSE, POC    Collection Time: 12/21/18  6:05 PM   Result Value Ref Range    Glucose (POC) 107 70 - 110 mg/dL   CBC WITH AUTOMATED DIFF    Collection Time: 12/21/18  7:50 PM   Result Value Ref Range    WBC 6.7 4.6 - 13.2 K/uL    RBC 4.18 (L) 4.20 - 5.30 M/uL    HGB 8.7 (L) 12.0 - 16.0 g/dL    HCT 28.4 (L) 35.0 - 45.0 %    MCV 67.9 (L) 74.0 - 97.0 FL    MCH 20.8 (L) 24.0 - 34.0 PG    MCHC 30.6 (L) 31.0 - 37.0 g/dL    RDW 14.6 (H) 11.6 - 14.5 %    PLATELET 099 997 - 630 K/uL    NEUTROPHILS 74 (H) 40 - 73 %    LYMPHOCYTES 17 (L) 21 - 52 %    MONOCYTES 9 3 - 10 %    EOSINOPHILS 0 0 - 5 %    BASOPHILS 0 0 - 2 %    ABS. NEUTROPHILS 5.0 1.8 - 8.0 K/UL    ABS. LYMPHOCYTES 1.1 0.9 - 3.6 K/UL    ABS. MONOCYTES 0.6 0.05 - 1.2 K/UL    ABS. EOSINOPHILS 0.0 0.0 - 0.4 K/UL    ABS.  BASOPHILS 0.0 0.0 - 0.1 K/UL    DF AUTOMATED     GLUCOSE, POC    Collection Time: 12/21/18  8:32 PM   Result Value Ref Range    Glucose (POC) 166 (H) 70 - 110 mg/dL   EKG, 12 LEAD, SUBSEQUENT    Collection Time: 12/21/18  9:43 PM   Result Value Ref Range    Ventricular Rate 114 BPM    Atrial Rate 114 BPM    P-R Interval 118 ms    QRS Duration 78 ms    Q-T Interval 390 ms    QTC Calculation (Bezet) 537 ms    Calculated P Axis 71 degrees    Calculated R Axis 41 degrees    Calculated T Axis 57 degrees    Diagnosis       Sinus tachycardia  Otherwise normal ECG  When compared with ECG of 21-DEC-2018 00:21,  Vent. rate has increased BY  49 BPM  T wave inversion no longer evident in Inferior leads  T wave inversion no longer evident in Anterolateral leads  Confirmed by Edgar Jennings MD, --- ([id number]) on 12/22/2018 11:15:55 AM     CBC WITH AUTOMATED DIFF    Collection Time: 12/22/18  2:55 AM   Result Value Ref Range    WBC 5.4 4.6 - 13.2 K/uL    RBC 3.96 (L) 4.20 - 5.30 M/uL    HGB 8.3 (L) 12.0 - 16.0 g/dL    HCT 26.8 (L) 35.0 - 45.0 %    MCV 67.7 (L) 74.0 - 97.0 FL    MCH 21.0 (L) 24.0 - 34.0 PG    MCHC 31.0 31.0 - 37.0 g/dL    RDW 14.4 11.6 - 14.5 %    PLATELET 763 989 - 182 K/uL    MPV 11.3 9.2 - 11.8 FL    NEUTROPHILS 56 40 - 73 %    LYMPHOCYTES 29 21 - 52 %    MONOCYTES 15 (H) 3 - 10 %    EOSINOPHILS 0 0 - 5 %    BASOPHILS 0 0 - 2 %    ABS. NEUTROPHILS 3.0 1.8 - 8.0 K/UL    ABS. LYMPHOCYTES 1.5 0.9 - 3.6 K/UL    ABS. MONOCYTES 0.8 0.05 - 1.2 K/UL    ABS. EOSINOPHILS 0.0 0.0 - 0.4 K/UL    ABS.  BASOPHILS 0.0 0.0 - 0.1 K/UL    DF AUTOMATED     PTT    Collection Time: 12/22/18  2:55 AM   Result Value Ref Range    aPTT >180.0 (HH) 23.0 - 75.2 SEC   METABOLIC PANEL, BASIC    Collection Time: 12/22/18  2:55 AM   Result Value Ref Range    Sodium 146 (H) 136 - 145 mmol/L    Potassium 3.8 3.5 - 5.5 mmol/L    Chloride 111 (H) 100 - 108 mmol/L    CO2 28 21 - 32 mmol/L    Anion gap 7 3.0 - 18 mmol/L    Glucose 145 (H) 74 - 99 mg/dL    BUN 7 7.0 - 18 MG/DL    Creatinine 0.55 (L) 0.6 - 1.3 MG/DL    BUN/Creatinine ratio 13 12 - 20      GFR est AA >60 >60 ml/min/1.73m2    GFR est non-AA >60 >60 ml/min/1.73m2    Calcium 7.8 (L) 8.5 - 10.1 MG/DL   CARDIAC PANEL,(CK, CKMB & TROPONIN)    Collection Time: 12/22/18  2:55 AM   Result Value Ref Range    CK 1,046 (H) 26 - 192 U/L    CK - MB 9.2 (H) <3.6 ng/ml    CK-MB Index 0.9 0.0 - 4.0 %    Troponin-I, QT 0.03 0.0 - 0.045 NG/ML   MAGNESIUM    Collection Time: 12/22/18  2:55 AM Result Value Ref Range    Magnesium 2.0 1.6 - 2.6 mg/dL   FERRITIN    Collection Time: 12/22/18  2:55 AM   Result Value Ref Range    Ferritin 235 8 - 388 NG/ML   IRON PROFILE    Collection Time: 12/22/18  2:55 AM   Result Value Ref Range    Iron 32 (L) 50 - 175 ug/dL    TIBC 178 (L) 250 - 450 ug/dL    Iron % saturation 18 %   EKG, 12 LEAD, SUBSEQUENT    Collection Time: 12/22/18  3:05 AM   Result Value Ref Range    Ventricular Rate 77 BPM    Atrial Rate 77 BPM    P-R Interval 134 ms    QRS Duration 84 ms    Q-T Interval 398 ms    QTC Calculation (Bezet) 450 ms    Calculated P Axis 68 degrees    Calculated R Axis 31 degrees    Calculated T Axis 46 degrees    Diagnosis       Normal sinus rhythm  Normal ECG  When compared with ECG of 21-DEC-2018 21:43,  T wave amplitude has decreased in Anterior leads  Confirmed by Yosef Izaguirre MD, --- ([id number]) on 12/22/2018 11:16:13 AM     GLUCOSE, POC    Collection Time: 12/22/18  6:37 AM   Result Value Ref Range    Glucose (POC) 109 70 - 110 mg/dL   TROPONIN I    Collection Time: 12/22/18 10:00 AM   Result Value Ref Range    Troponin-I, QT 0.05 (H) 0.0 - 0.045 NG/ML   ECHO ADULT COMPLETE    Collection Time: 12/22/18 11:31 AM   Result Value Ref Range    Aortic Valve Systolic Peak Velocity 9.70 cm/s    AoV PG 0.0 mmHg    LVIDd 4.07 3.9 - 5.3 cm    LVPWd 1.04 (A) 0.6 - 0.9 cm    LVIDs 3.08 cm    IVSd 1.05 (A) 0.6 - 0.9 cm    LV ED Vol A2C 105.3 mL    LV ES Vol A4C 36.2 mL    LV ES Vol BP 47.2 19 - 49 mL    LVOT d 1.95 cm    LVOT Peak Velocity 120.65 cm/s    LVOT Peak Gradient 5.8 mmHg    MVA (PHT) 5.9 cm2    MV A Sai 114.91 cm/s    MV E Sai 0.85 cm/s    MV E/A 0.01     BP EF 52.8 (A) 55 - 100 %    LV Ejection Fraction MOD 4C 57 %    LV Ejection Fraction MOD 2C 46 %    LA Vol 4C 53.28 (A) 22 - 52 mL    LA Vol 2C 65.52 (A) 22 - 52 mL    LV Mass .0 67 - 162 g    LV Mass AL Index 88.0 43 - 95 g/m2    LV ES Vol A2C 57.3 mL    LVES Vol Index BP 26.0 mL/m2    LV ED Vol A4C 84.9 mL    LVED Vol Index BP 54.9 mL/m2    Mitral Valve E Wave Deceleration Time 128.0 ms    Mitral Valve Pressure Half-time 37.1 ms    LV ED Vol BP 99.9 56 - 104 ml    LA Vol Index 36.04 16 - 28 ml/m2    LA Vol Index 29.30 16 - 28 ml/m2    LVED Vol Index A4C 46.7 mL/m2    LVED Vol Index A2C 57.9 mL/m2    LVES Vol Index A4C 19.9 mL/m2    LVES Vol Index A2C 31.5 mL/m2   GLUCOSE, POC    Collection Time: 12/22/18 12:46 PM   Result Value Ref Range    Glucose (POC) 113 (H) 70 - 110 mg/dL   PTT    Collection Time: 12/22/18  3:30 PM   Result Value Ref Range    aPTT 78.1 (H) 23.0 - 36.4 SEC   GLUCOSE, POC    Collection Time: 12/22/18  5:50 PM   Result Value Ref Range    Glucose (POC) 122 (H) 70 - 110 mg/dL   GLUCOSE, POC    Collection Time: 12/22/18  9:27 PM   Result Value Ref Range    Glucose (POC) 145 (H) 70 - 110 mg/dL   PTT    Collection Time: 12/22/18 11:00 PM   Result Value Ref Range    aPTT 113.3 (H) 23.0 - 36.4 SEC   CBC WITH AUTOMATED DIFF    Collection Time: 12/23/18  3:00 AM   Result Value Ref Range    WBC 7.6 4.6 - 13.2 K/uL    RBC 3.49 (L) 4.20 - 5.30 M/uL    HGB 7.2 (L) 12.0 - 16.0 g/dL    HCT 24.3 (L) 35.0 - 45.0 %    MCV 69.6 (L) 74.0 - 97.0 FL    MCH 20.6 (L) 24.0 - 34.0 PG    MCHC 29.6 (L) 31.0 - 37.0 g/dL    RDW 15.0 (H) 11.6 - 14.5 %    PLATELET 823 190 - 478 K/uL    MPV 11.1 9.2 - 11.8 FL    NEUTROPHILS 63 40 - 73 %    LYMPHOCYTES 26 21 - 52 %    MONOCYTES 10 3 - 10 %    EOSINOPHILS 1 0 - 5 %    BASOPHILS 0 0 - 2 %    ABS. NEUTROPHILS 4.8 1.8 - 8.0 K/UL    ABS. LYMPHOCYTES 2.0 0.9 - 3.6 K/UL    ABS. MONOCYTES 0.8 0.05 - 1.2 K/UL    ABS. EOSINOPHILS 0.1 0.0 - 0.4 K/UL    ABS.  BASOPHILS 0.0 0.0 - 0.1 K/UL    DF AUTOMATED     METABOLIC PANEL, BASIC    Collection Time: 12/23/18  3:00 AM   Result Value Ref Range    Sodium 140 136 - 145 mmol/L    Potassium 3.8 3.5 - 5.5 mmol/L    Chloride 106 100 - 108 mmol/L    CO2 28 21 - 32 mmol/L    Anion gap 6 3.0 - 18 mmol/L    Glucose 112 (H) 74 - 99 mg/dL    BUN 7 7.0 - 18 MG/DL Creatinine 0.55 (L) 0.6 - 1.3 MG/DL    BUN/Creatinine ratio 13 12 - 20      GFR est AA >60 >60 ml/min/1.73m2    GFR est non-AA >60 >60 ml/min/1.73m2    Calcium 7.6 (L) 8.5 - 10.1 MG/DL   CK    Collection Time: 12/23/18  3:00 AM   Result Value Ref Range     (H) 26 - 192 U/L   PTT    Collection Time: 12/23/18  3:00 AM   Result Value Ref Range    aPTT 80.5 (H) 23.0 - 36.4 SEC   GLUCOSE, POC    Collection Time: 12/23/18  9:17 AM   Result Value Ref Range    Glucose (POC) 101 70 - 110 mg/dL   GLUCOSE, POC    Collection Time: 12/23/18 11:38 AM   Result Value Ref Range    Glucose (POC) 126 (H) 70 - 110 mg/dL       Lab/Data Reviewed: All lab results for the last 24 hours reviewed. XRays were reviewed in past 24 hours    Medications Reviewed            Assessment/Plan     Active Problems:    Atherosclerotic PVD with intermittent claudication (HCC) (12/20/2018)      Hypokalemia (12/20/2018)      CAD (coronary artery disease) (12/20/2018)      DM (diabetes mellitus) ([facility] (12/20/2018)      HTN (hypertension) (12/20/2018)      Depression (12/20/2018)      Current smoker (12/20/2018)      Anemia (12/22/2018)        Hypernatremia   Rhabdomyolysis       CARE PLAN:     PVD with Claudication   - Continue Heparin drip   - pain control as needed   - s/p Left popliteal and tibial endarterectomy, patch angioplasty.  left FEMORAL below knee to POPLITEAL BYPASS  - Patient on Levophed to keep SBP > 100 per vascular surgery  - Vascular Surgery on board      CAD  - Plavix  - Losartan   - aspirin   - Lipitor      DM  - SSI   - A1c     HTN  - hold  losartan , Norvasc  due to hypotension on pressor      Depression   - Wellbutrin , Seroquel, clonepin      Smoker   - advise cessation   - Nicotine Patch      Hypokalemia   - replace   - resolved     Hypernatremia   - Na 146 today   - switch to 1/2NS  - resolved     Rhabdomyolysis   - non traumatic   - Aggressive IVF  - Trend CK     Anemia   - microcytic   - transfuse if < 7   - stool occult test pending     Cough   - cxr 12/22 result pending however per my report appears to have pulm congestion  - lasix 20 mg IV X 1   - albuterol as needed      DM Neuropathy  - Neurontin      DVT prophylaxis        Full code       Lily Barron MD  December 23, 2018

## 2018-12-23 NOTE — PROGRESS NOTES
[facility]  [address] [place], [address] [facility]  Dr. Maria Antonia Cadena, Dr. Glenn Greene , Dr. Clarissa Mitchell  [phone] FAX# [phone]  PROGRESS NOTE    Patient: Remi Silveira MRN: [id number]  SSN: [id number]    YOB: 1965  Age: 48 y.o. Sex: female      Date: 12/23/2018    Hospital: [address] [place] [address] Day: 2    Plan:   Weaning Levofed. Advance diet. Dressing reinforcement as necessary with plan change tomorrow. Monitor vitals/ intake/output and labs    Assessment:   POD # 2 s/p left femoral to below knee popliteal artery bypass. Some anemia overnight with oozing from her proximal groin incision. Received one unit pRBCs  Subjective: Tolerating diet Moderate pain  Not required    Objective:   Admit weight: Weight: 74.8 kg (165 lb)  Last recorded weight: Weight: 74.4 kg (164 lb)    Visit Vitals  /70   Pulse (!) 101   Temp 98.2 °F (36.8 °C)   Resp 16   Ht 5' 5\" (1.651 m)   Wt 74.4 kg (164 lb)   SpO2 100%   BMI 27.29 kg/m²       Intake/Output Summary (Last 24 hours) at 12/23/2018 1437  Last data filed at 12/23/2018 1300  Gross per 24 hour   Intake 1139.01 ml   Output 2250 ml   Net -1110.99 ml       Physical exam was negative except for: Left leg dressings currently clean and dry. Right leg dressing dry.        Labs:     Recent Labs     12/23/18  0300 12/22/18  0255 12/21/18  1950   WBC 7.6 5.4 6.7   HGB 7.2* 8.3* 8.7*   HCT 24.3* 26.8* 28.4*    228 201   RDW 15.0* 14.4 14.6*     Recent Labs     12/23/18  0300 12/22/18  0255 12/21/18  0500 12/20/18  1456    146* 140 136   K 3.8 3.8 3.6 3.2*    111* 107 101   CO2 28 28 25 30   * 145* 100* 91   BUN 7 7 19* 10   CREA 0.55* 0.55* 0.72 0.75   CA 7.6* 7.8* 8.0* 8.3*   MG  --  2.0  --   --    ALB  --   --   --  3.4   SGOT  --   --   --  37   ALT  --   --   --  26   INR  --   --  1.1 1.0     No results for input(s): PH, PCO2, PO2, HCO3, FIO2 in the last 72 hours.     Kate Macias MD, FACS

## 2018-12-24 LAB
ABO + RH BLD: NORMAL
ANION GAP SERPL CALC-SCNC: 6 MMOL/L (ref 3–18)
APTT PPP: 103.1 SEC (ref 23–36.4)
APTT PPP: 80.9 SEC (ref 23–36.4)
APTT PPP: 87.3 SEC (ref 23–36.4)
BASOPHILS # BLD: 0 K/UL (ref 0–0.1)
BASOPHILS NFR BLD: 0 % (ref 0–2)
BLD PROD TYP BPU: NORMAL
BLOOD GROUP ANTIBODIES SERPL: NORMAL
BPU ID: NORMAL
BUN SERPL-MCNC: 7 MG/DL (ref 7–18)
BUN/CREAT SERPL: 14 (ref 12–20)
CALCIUM SERPL-MCNC: 7.7 MG/DL (ref 8.5–10.1)
CALLED TO:,BCALL1: NORMAL
CHLORIDE SERPL-SCNC: 103 MMOL/L (ref 100–108)
CK SERPL-CCNC: 367 U/L (ref 26–192)
CO2 SERPL-SCNC: 30 MMOL/L (ref 21–32)
CREAT SERPL-MCNC: 0.51 MG/DL (ref 0.6–1.3)
CROSSMATCH RESULT,%XM: NORMAL
DIFFERENTIAL METHOD BLD: ABNORMAL
EOSINOPHIL # BLD: 0.1 K/UL (ref 0–0.4)
EOSINOPHIL NFR BLD: 3 % (ref 0–5)
ERYTHROCYTE [DISTWIDTH] IN BLOOD BY AUTOMATED COUNT: 16.3 % (ref 11.6–14.5)
GLUCOSE BLD STRIP.AUTO-MCNC: 100 MG/DL (ref 70–110)
GLUCOSE BLD STRIP.AUTO-MCNC: 107 MG/DL (ref 70–110)
GLUCOSE BLD STRIP.AUTO-MCNC: 82 MG/DL (ref 70–110)
GLUCOSE BLD STRIP.AUTO-MCNC: 90 MG/DL (ref 70–110)
GLUCOSE SERPL-MCNC: 105 MG/DL (ref 74–99)
HCT VFR BLD AUTO: 25.4 % (ref 35–45)
HCT VFR BLD AUTO: 27.3 % (ref 35–45)
HCT VFR BLD AUTO: 28.3 % (ref 35–45)
HGB BLD-MCNC: 7.9 G/DL (ref 12–16)
HGB BLD-MCNC: 8.5 G/DL (ref 12–16)
HGB BLD-MCNC: 8.5 G/DL (ref 12–16)
LYMPHOCYTES # BLD: 1 K/UL (ref 0.9–3.6)
LYMPHOCYTES NFR BLD: 19 % (ref 21–52)
MCH RBC QN AUTO: 22.1 PG (ref 24–34)
MCHC RBC AUTO-ENTMCNC: 31.1 G/DL (ref 31–37)
MCV RBC AUTO: 70.9 FL (ref 74–97)
MONOCYTES # BLD: 0.6 K/UL (ref 0.05–1.2)
MONOCYTES NFR BLD: 12 % (ref 3–10)
NEUTS SEG # BLD: 3.5 K/UL (ref 1.8–8)
NEUTS SEG NFR BLD: 66 % (ref 40–73)
PLATELET # BLD AUTO: 242 K/UL (ref 135–420)
PMV BLD AUTO: 10.7 FL (ref 9.2–11.8)
POTASSIUM SERPL-SCNC: 4 MMOL/L (ref 3.5–5.5)
RBC # BLD AUTO: 3.58 M/UL (ref 4.2–5.3)
SODIUM SERPL-SCNC: 139 MMOL/L (ref 136–145)
SPECIMEN EXP DATE BLD: NORMAL
STATUS OF UNIT,%ST: NORMAL
UNIT DIVISION, %UDIV: 0
WBC # BLD AUTO: 5.3 K/UL (ref 4.6–13.2)

## 2018-12-24 PROCEDURE — 74011250637 HC RX REV CODE- 250/637: Performed by: FAMILY MEDICINE

## 2018-12-24 PROCEDURE — 85730 THROMBOPLASTIN TIME PARTIAL: CPT

## 2018-12-24 PROCEDURE — 65610000006 HC RM INTENSIVE CARE

## 2018-12-24 PROCEDURE — 74011250637 HC RX REV CODE- 250/637: Performed by: INTERNAL MEDICINE

## 2018-12-24 PROCEDURE — 36415 COLL VENOUS BLD VENIPUNCTURE: CPT

## 2018-12-24 PROCEDURE — 80048 BASIC METABOLIC PNL TOTAL CA: CPT

## 2018-12-24 PROCEDURE — 74011250636 HC RX REV CODE- 250/636: Performed by: SURGERY

## 2018-12-24 PROCEDURE — 74011000258 HC RX REV CODE- 258: Performed by: FAMILY MEDICINE

## 2018-12-24 PROCEDURE — 82550 ASSAY OF CK (CPK): CPT

## 2018-12-24 PROCEDURE — 77030027138 HC INCENT SPIROMETER -A

## 2018-12-24 PROCEDURE — 77030013079 HC BLNKT BAIR HGGR 3M -A

## 2018-12-24 PROCEDURE — 85018 HEMOGLOBIN: CPT

## 2018-12-24 PROCEDURE — 82962 GLUCOSE BLOOD TEST: CPT

## 2018-12-24 PROCEDURE — 74011250636 HC RX REV CODE- 250/636: Performed by: FAMILY MEDICINE

## 2018-12-24 PROCEDURE — 36600 WITHDRAWAL OF ARTERIAL BLOOD: CPT

## 2018-12-24 PROCEDURE — 85025 COMPLETE CBC W/AUTO DIFF WBC: CPT

## 2018-12-24 RX ADMIN — GABAPENTIN 300 MG: 300 CAPSULE ORAL at 15:46

## 2018-12-24 RX ADMIN — LEVOFLOXACIN 750 MG: 5 INJECTION, SOLUTION INTRAVENOUS at 19:27

## 2018-12-24 RX ADMIN — CLONAZEPAM 1 MG: 0.5 TABLET ORAL at 18:37

## 2018-12-24 RX ADMIN — MORPHINE SULFATE 4 MG: 4 INJECTION INTRAVENOUS at 21:31

## 2018-12-24 RX ADMIN — POTASSIUM CHLORIDE 40 MEQ: 20 TABLET, EXTENDED RELEASE ORAL at 08:23

## 2018-12-24 RX ADMIN — CLONAZEPAM 1 MG: 0.5 TABLET ORAL at 08:16

## 2018-12-24 RX ADMIN — CLOPIDOGREL BISULFATE 75 MG: 75 TABLET, FILM COATED ORAL at 09:00

## 2018-12-24 RX ADMIN — ACETAMINOPHEN 650 MG: 650 TABLET, FILM COATED, EXTENDED RELEASE ORAL at 20:22

## 2018-12-24 RX ADMIN — METOPROLOL TARTRATE 25 MG: 25 TABLET ORAL at 21:30

## 2018-12-24 RX ADMIN — ASPIRIN 81 MG 81 MG: 81 TABLET ORAL at 08:24

## 2018-12-24 RX ADMIN — HEPARIN SODIUM AND DEXTROSE 21 UNITS/KG/HR: 10000; 5 INJECTION INTRAVENOUS at 07:30

## 2018-12-24 RX ADMIN — ACETAMINOPHEN 650 MG: 650 TABLET, FILM COATED, EXTENDED RELEASE ORAL at 08:23

## 2018-12-24 RX ADMIN — GABAPENTIN 300 MG: 300 CAPSULE ORAL at 21:30

## 2018-12-24 RX ADMIN — ATORVASTATIN CALCIUM 80 MG: 40 TABLET, FILM COATED ORAL at 09:00

## 2018-12-24 RX ADMIN — CEFTRIAXONE 1 G: 1 INJECTION, POWDER, FOR SOLUTION INTRAMUSCULAR; INTRAVENOUS at 18:36

## 2018-12-24 RX ADMIN — QUETIAPINE FUMARATE 300 MG: 300 TABLET, EXTENDED RELEASE ORAL at 22:30

## 2018-12-24 RX ADMIN — GABAPENTIN 300 MG: 300 CAPSULE ORAL at 08:23

## 2018-12-24 NOTE — PROGRESS NOTES
0730 Report at the bedside from off going RN, covering lines and drains and skin. Patient cont on heparin and presser for blood pressure support. Patient has course wet cough producing large to medium amount tan/yellow thick sputum. Patient introduced to incentive spirometer to increase lung capacity and improve function. . Patient not cooperative with teaching. Patient requiring frequent intervention due to very poor impulse control, attempting to get out of bed in an unsafe manner over the rales. Patient also pulling on tubes and lines, will stop when requested, yet as soon as staff leaves area patient moving around. Patient pulled A-line as she states it was in the way of using her phone. A line removed and site held for 20 minutes due to heparin. Pressure dressing applied when no s/s bleeding. Patient tolerating diet well, requiring encouragement to take food, mother present and fed patient. Patient bleeding from surgical site serosanguinous especially When patient moving around too much. MD Ritter He has seen and is aware. Complete bath given to patient. Heparin DC'D at 1600 as per orders.

## 2018-12-24 NOTE — PROGRESS NOTES
[facility]  [address] [facility]. [address] [facility], [address] [facility]   Dr. Chun Oden, Dr. Mariza Mcclellan, Dr. Estephania Maguire, & Dr. Katlyn Macias  [phone] FAX# [phone]    Progress Note    Patient: Jael Car MRN: [id number]  SSN: [id number]    YOB: 1965  Age: 48 y.o. Sex: female      Admit Date: 12/20/2018    LOS: 4 days     Subjective:     POD 3 from left Popliteal/tibial endarterectomy with patch angioplasty and Fem pop bypass. Pain controlled. Foot warm. Objective:     Vitals:    12/24/18 0630 12/24/18 0700 12/24/18 0730 12/24/18 0800   BP: 115/66 116/74 103/68 122/66   Pulse: 91 93 94 (!) 101   Resp: 25 24 24 24   Temp:    (!) 102.2 °F (39 °C)   SpO2: 99%  100% 98%   Weight:       Height:            Intake and Output:  Current Shift: No intake/output data recorded. Last three shifts: 12/22 1901 - 12/24 0700  In: 2622 [P.O.:1070; I.V.:915.7]  Out: 2932 [Urine:2560; Drains:55]    Physical Exam:   GENERAL: alert, cooperative, no distress, appears stated age  EYE: conjunctivae/corneas clear. PERRL, EOM's intact. Fundi benign  LYMPHATIC: Cervical, supraclavicular, and axillary nodes normal.   THROAT & NECK: normal and no erythema or exudates noted. LUNG: clear to auscultation bilaterally  HEART: regular rate and rhythm, S1, S2 normal, no murmur, click, rub or gallop  ABDOMEN: soft, non-tender. Bowel sounds normal. No masses,  no organomegaly  EXTREMITIES:  RLE c/d/i wound. LLE palpable DP, doppler PT. Doppler graft pulse. Decrease sensation at toes. Motor intact. NEUROLOGIC: AOx3. Gait normal. Reflexes and motor strength normal and symmetric. Cranial nerves 2-12 and sensation grossly intact.   PSYCHIATRIC: non focal    Lab/Data Review:  BMP:   Lab Results   Component Value Date/Time     12/24/2018 05:10 AM    K 4.0 12/24/2018 05:10 AM     12/24/2018 05:10 AM    CO2 30 12/24/2018 05:10 AM    AGAP 6 12/24/2018 05:10 AM  (H) 12/24/2018 05:10 AM    BUN 7 12/24/2018 05:10 AM    CREA 0.51 (L) 12/24/2018 05:10 AM    GFRAA >60 12/24/2018 05:10 AM    GFRNA >60 12/24/2018 05:10 AM     CBC:   Lab Results   Component Value Date/Time    WBC 5.3 12/24/2018 05:10 AM    HGB 7.9 (L) 12/24/2018 05:10 AM    HCT 25.4 (L) 12/24/2018 05:10 AM     12/24/2018 05:10 AM            Assessment:     Active Problems:    Atherosclerotic PVD with intermittent claudication ([facility] [address] (12/20/2018)      Hypokalemia (12/20/2018)      CAD (coronary artery disease) (12/20/2018)      DM (diabetes mellitus) ([facility] [address] (12/20/2018)      HTN (hypertension) (12/20/2018)      Depression (12/20/2018)      Current smoker (12/20/2018)      Anemia (12/22/2018)        Plan:     OOB to chiar  Transfuse 1 PRBC's  Transition to po AC. Plavix ad heparin for Cardiac and bypass. D/D STACI drains later today after more mobile.      Signed By: Libia Sanders MD     December 24, 2018

## 2018-12-24 NOTE — PROGRESS NOTES
[phone]  Shift summary    Assumed care of pt asleep, easily arousable. AOx4. Heparin and levophed gtt check done with off going RN. Pulse check done with off going RN  During the shift, APTT stayed therapeutic and there was no change in heparin gtt. Levophed was titrated down from 6 mcg/min to 3 mcg/min. Lt leg incision oozing blood. drsg removed and new dressing applied and secured. partial bath given and gown and linen changed. Bedside and Verbal shift change report given to Demi Pinto and Sher SALAZAR  (oncoming nurse) by Oswald Siu RN   (offgoing nurse). Report included the following information SBAR, Kardex, Intake/Output, MAR, Recent Results and Cardiac Rhythm NSR with inverted T wave.

## 2018-12-24 NOTE — PROGRESS NOTES
200 Dr. Kathleen Harper informed of patient's temp of 102.2. Blood cultures drawn last night. Order to dangle at side of bed after fever treated received and order to check h/h with next ptt.

## 2018-12-24 NOTE — PROGRESS NOTES
Progress Note      Patient: Fredis Blackmon               Sex: female          DOA: 12/20/2018       YOB: 1965      Age:  48 y.o.        LOS:  LOS: 4 days               Subjective / Interval Hx  I       Patient post Left popliteal and tibial endarterectomy, patch angioplasty. left FEMORAL below knee to POPLITEAL BYPASS yesterday PM. No fever. C/o pain in the middle toe . On Levophed per Vascular surgery . EKG was noted to have EKG abnormality. Patient denies CP.    12/23 : Episodes of hypotension noted , now resolved . On pressors Levophed. Continue to hold BP medications     12/24:Pt saying that she wants to go home    Objective:      Visit Vitals  /66   Pulse 85   Temp 98.5 °F (36.9 °C)   Resp 25   Ht 5' 5\" (1.651 m)   Wt 76.2 kg (167 lb 15.9 oz)   SpO2 99%   BMI 27.96 kg/m²     Physical Exam   Constitutional: She is oriented to person, place, and time. She appears well-developed and well-nourished. HENT:   Head: Normocephalic and atraumatic. Eyes: Conjunctivae are normal.   Neck: Neck supple. Cardiovascular: Normal rate, regular rhythm and normal heart sounds. No murmur heard. Pulmonary/Chest: Effort normal. She has rales. Abdominal: Soft. Bowel sounds are normal.   Musculoskeletal: She exhibits no edema. Neurological: She is alert and oriented to person, place, and time. Skin: No rash noted. No erythema. No pallor. Psychiatric: She has a normal mood and affect. Intake and Output:  Current Shift:  12/24 0701 - 12/24 1900  In: 161.3 [P.O.:140; I.V.:21.3]  Out: 48 [Urine:40; Drains:13]  Last three shifts:  12/22 1901 - 12/24 0700  In: 2425.9 [P.O.:1070;  I.V.:1024.6]  Out: 9781 [Urine:3005; Drains:65]    Recent Results (from the past 48 hour(s))   PTT    Collection Time: 12/22/18  3:30 PM   Result Value Ref Range    aPTT 78.1 (H) 23.0 - 36.4 SEC   GLUCOSE, POC    Collection Time: 12/22/18  5:50 PM   Result Value Ref Range    Glucose (POC) 122 (H) 70 - 110 mg/dL GLUCOSE, POC    Collection Time: 12/22/18  9:27 PM   Result Value Ref Range    Glucose (POC) 145 (H) 70 - 110 mg/dL   PTT    Collection Time: 12/22/18 11:00 PM   Result Value Ref Range    aPTT 113.3 (H) 23.0 - 36.4 SEC   CBC WITH AUTOMATED DIFF    Collection Time: 12/23/18  3:00 AM   Result Value Ref Range    WBC 7.6 4.6 - 13.2 K/uL    RBC 3.49 (L) 4.20 - 5.30 M/uL    HGB 7.2 (L) 12.0 - 16.0 g/dL    HCT 24.3 (L) 35.0 - 45.0 %    MCV 69.6 (L) 74.0 - 97.0 FL    MCH 20.6 (L) 24.0 - 34.0 PG    MCHC 29.6 (L) 31.0 - 37.0 g/dL    RDW 15.0 (H) 11.6 - 14.5 %    PLATELET 035 673 - 078 K/uL    MPV 11.1 9.2 - 11.8 FL    NEUTROPHILS 63 40 - 73 %    LYMPHOCYTES 26 21 - 52 %    MONOCYTES 10 3 - 10 %    EOSINOPHILS 1 0 - 5 %    BASOPHILS 0 0 - 2 %    ABS. NEUTROPHILS 4.8 1.8 - 8.0 K/UL    ABS. LYMPHOCYTES 2.0 0.9 - 3.6 K/UL    ABS. MONOCYTES 0.8 0.05 - 1.2 K/UL    ABS. EOSINOPHILS 0.1 0.0 - 0.4 K/UL    ABS.  BASOPHILS 0.0 0.0 - 0.1 K/UL    DF AUTOMATED     METABOLIC PANEL, BASIC    Collection Time: 12/23/18  3:00 AM   Result Value Ref Range    Sodium 140 136 - 145 mmol/L    Potassium 3.8 3.5 - 5.5 mmol/L    Chloride 106 100 - 108 mmol/L    CO2 28 21 - 32 mmol/L    Anion gap 6 3.0 - 18 mmol/L    Glucose 112 (H) 74 - 99 mg/dL    BUN 7 7.0 - 18 MG/DL    Creatinine 0.55 (L) 0.6 - 1.3 MG/DL    BUN/Creatinine ratio 13 12 - 20      GFR est AA >60 >60 ml/min/1.73m2    GFR est non-AA >60 >60 ml/min/1.73m2    Calcium 7.6 (L) 8.5 - 10.1 MG/DL   CK    Collection Time: 12/23/18  3:00 AM   Result Value Ref Range     (H) 26 - 192 U/L   PTT    Collection Time: 12/23/18  3:00 AM   Result Value Ref Range    aPTT 80.5 (H) 23.0 - 36.4 SEC   GLUCOSE, POC    Collection Time: 12/23/18  9:17 AM   Result Value Ref Range    Glucose (POC) 101 70 - 110 mg/dL   PTT    Collection Time: 12/23/18 11:14 AM   Result Value Ref Range    aPTT 56.9 (H) 23.0 - 36.4 SEC   GLUCOSE, POC    Collection Time: 12/23/18 11:38 AM   Result Value Ref Range    Glucose (POC) 126 (H) 70 - 110 mg/dL   HGB & HCT    Collection Time: 12/23/18  1:54 PM   Result Value Ref Range    HGB 10.2 (L) 12.0 - 16.0 g/dL    HCT 33.0 (L) 35.0 - 45.0 %   PTT    Collection Time: 12/23/18  4:41 PM   Result Value Ref Range    aPTT 68.7 (H) 23.0 - 36.4 SEC   GLUCOSE, POC    Collection Time: 12/23/18  5:21 PM   Result Value Ref Range    Glucose (POC) 115 (H) 70 - 110 mg/dL   CULTURE, BLOOD    Collection Time: 12/23/18  9:20 PM   Result Value Ref Range    Special Requests: NO SPECIAL REQUESTS      Culture result: NO GROWTH AFTER 12 HOURS     LACTIC ACID    Collection Time: 12/23/18  9:20 PM   Result Value Ref Range    Lactic acid 0.8 0.4 - 2.0 MMOL/L   CULTURE, BLOOD    Collection Time: 12/23/18  9:23 PM   Result Value Ref Range    Special Requests: NO SPECIAL REQUESTS      Culture result: NO GROWTH AFTER 12 HOURS     GLUCOSE, POC    Collection Time: 12/23/18 10:34 PM   Result Value Ref Range    Glucose (POC) 129 (H) 70 - 110 mg/dL   PTT    Collection Time: 12/24/18 12:40 AM   Result Value Ref Range    aPTT 103.1 (H) 23.0 - 36.4 SEC   CBC WITH AUTOMATED DIFF    Collection Time: 12/24/18  5:10 AM   Result Value Ref Range    WBC 5.3 4.6 - 13.2 K/uL    RBC 3.58 (L) 4.20 - 5.30 M/uL    HGB 7.9 (L) 12.0 - 16.0 g/dL    HCT 25.4 (L) 35.0 - 45.0 %    MCV 70.9 (L) 74.0 - 97.0 FL    MCH 22.1 (L) 24.0 - 34.0 PG    MCHC 31.1 31.0 - 37.0 g/dL    RDW 16.3 (H) 11.6 - 14.5 %    PLATELET 113 218 - 308 K/uL    MPV 10.7 9.2 - 11.8 FL    NEUTROPHILS 66 40 - 73 %    LYMPHOCYTES 19 (L) 21 - 52 %    MONOCYTES 12 (H) 3 - 10 %    EOSINOPHILS 3 0 - 5 %    BASOPHILS 0 0 - 2 %    ABS. NEUTROPHILS 3.5 1.8 - 8.0 K/UL    ABS. LYMPHOCYTES 1.0 0.9 - 3.6 K/UL    ABS. MONOCYTES 0.6 0.05 - 1.2 K/UL    ABS. EOSINOPHILS 0.1 0.0 - 0.4 K/UL    ABS.  BASOPHILS 0.0 0.0 - 0.1 K/UL    DF AUTOMATED     CK    Collection Time: 12/24/18  5:10 AM   Result Value Ref Range     (H) 26 - 286 U/L   METABOLIC PANEL, BASIC    Collection Time: 12/24/18  5:10 AM Result Value Ref Range    Sodium 139 136 - 145 mmol/L    Potassium 4.0 3.5 - 5.5 mmol/L    Chloride 103 100 - 108 mmol/L    CO2 30 21 - 32 mmol/L    Anion gap 6 3.0 - 18 mmol/L    Glucose 105 (H) 74 - 99 mg/dL    BUN 7 7.0 - 18 MG/DL    Creatinine 0.51 (L) 0.6 - 1.3 MG/DL    BUN/Creatinine ratio 14 12 - 20      GFR est AA >60 >60 ml/min/1.73m2    GFR est non-AA >60 >60 ml/min/1.73m2    Calcium 7.7 (L) 8.5 - 10.1 MG/DL   PTT    Collection Time: 12/24/18  5:10 AM   Result Value Ref Range    aPTT 87.3 (H) 23.0 - 36.4 SEC   GLUCOSE, POC    Collection Time: 12/24/18  8:44 AM   Result Value Ref Range    Glucose (POC) 100 70 - 110 mg/dL   PTT    Collection Time: 12/24/18 12:25 PM   Result Value Ref Range    aPTT 80.9 (H) 23.0 - 36.4 SEC   HGB & HCT    Collection Time: 12/24/18 12:25 PM   Result Value Ref Range    HGB 8.5 (L) 12.0 - 16.0 g/dL    HCT 28.3 (L) 35.0 - 45.0 %   GLUCOSE, POC    Collection Time: 12/24/18 12:37 PM   Result Value Ref Range    Glucose (POC) 82 70 - 110 mg/dL       Lab/Data Reviewed: All lab results for the last 24 hours reviewed. XRays were reviewed in past 24 hours    Medications Reviewed            Assessment/Plan     Active Problems:    Atherosclerotic PVD with intermittent claudication (HCC) (12/20/2018)      Hypokalemia (12/20/2018)      CAD (coronary artery disease) (12/20/2018)      DM (diabetes mellitus) ([facility] [address] (12/20/2018)      HTN (hypertension) (12/20/2018)      Depression (12/20/2018)      Current smoker (12/20/2018)      Anemia (12/22/2018)        Hypernatremia   Rhabdomyolysis       CARE PLAN:     PVD with Claudication   - Continue Heparin drip   - pain control as needed   - s/p Left popliteal and tibial endarterectomy, patch angioplasty.  left FEMORAL below knee to POPLITEAL BYPASS  - Patient on Levophed to keep SBP > 100 per vascular surgery  - Vascular Surgery on board      CAD  - Plavix  - Losartan   - aspirin   - Lipitor      DM  - SSI   - A1c     HTN  - hold  losartan , Norvasc  due to hypotension on pressor      Depression   - Wellbutrin , Seroquel, clonepin      Smoker   - advise cessation   - Nicotine Patch      Hypokalemia   - replace   - resolved     Hypernatremia   - Na 146 today   - switch to 1/2NS  - resolved     Rhabdomyolysis   - non traumatic   - Aggressive IVF  - Trend CK     Anemia   - microcytic   - transfuse if < 7   - stool occult test pending     Cough   - cxr 12/22 c/w right greater than left lower lobe airspace disease or atelectasis  - lasix 20 mg IV X 1 was given on that day before cxr results known  - albuterol as needed      DM Neuropathy  - Neurontin      DVT prophylaxis        Full code       Ry Phan MD  December 24, 2018

## 2018-12-24 NOTE — PROGRESS NOTES
Patient discharge plan is to return home with home care. Heparin and levophed drips. She is receiving IV antibiotics. Patient has [place] which will cover anticoagulation therapy with minimal copay. Case management following.  Emi Fuentes RN

## 2018-12-24 NOTE — PROGRESS NOTES
[facility] - Progress Note  Admit Date: 12/20/2018    Assessment:     Hospital Problems  Date Reviewed: 11/4/2014          Codes Class Noted POA    Anemia ICD-10-CM: D64.9  ICD-9-CM: 285.9  12/22/2018 Unknown        Atherosclerotic PVD with intermittent claudication ([facility] ICD-10-CM: I70.219  ICD-9-CM: 440.21  12/20/2018 Unknown        Hypokalemia ICD-10-CM: E87.6  ICD-9-CM: 276.8  12/20/2018 Yes        CAD (coronary artery disease) (Chronic) ICD-10-CM: I25.10  ICD-9-CM: 414.00  12/20/2018 Unknown        DM (diabetes mellitus) ([facility] (Chronic) ICD-10-CM: E11.9  ICD-9-CM: 250.00  12/20/2018 Unknown        HTN (hypertension) (Chronic) ICD-10-CM: I10  ICD-9-CM: 401.9  12/20/2018 Unknown        Depression (Chronic) ICD-10-CM: F32.9  ICD-9-CM: 776  12/20/2018 Unknown        Current smoker ICD-10-CM: F17.200  ICD-9-CM: 305.1  12/20/2018 Yes              Plan:     -Transfusions as needed, defer to primary/vascular surgery teams. Hgb 7.9 this AM.  -Echo this admission with normal EF without RWMA. -No further cardiac evaluation planned at this time. Will be available as needed if additional concerns arise. Subjective:     No new complaints.      Objective:      Patient Vitals for the past 8 hrs:   Temp Pulse Resp BP SpO2   12/24/18 0800 (!) 102.2 °F (39 °C) (!) 101 24 122/66 98 %   12/24/18 0730  94 24 103/68 100 %   12/24/18 0700  93 24 116/74    12/24/18 0630  91 25 115/66 99 %   12/24/18 0600  92 28 107/49 100 %   12/24/18 0530  88 25 109/63 100 %   12/24/18 0500  87 23 104/61 100 %   12/24/18 0430  88 24 106/82    12/24/18 0400 98.6 °F (37 °C) 88 22 106/59 100 %         Patient Vitals for the past 96 hrs:   Weight   12/23/18 1230 167 lb 15.9 oz (76.2 kg)   12/22/18 1131 164 lb (74.4 kg)   12/21/18 2000 164 lb 14.5 oz (74.8 kg)   12/20/18 1512 165 lb (74.8 kg)                    Intake/Output Summary (Last 24 hours) at 12/24/2018 1135  Last data filed at 12/24/2018 0700  Gross per 24 hour Intake 1363.98 ml   Output 1820 ml   Net -456.02 ml       Physical Exam:  General:  alert, cooperative, no distress, appears stated age  Neck:  no JVD  Lungs:  clear to auscultation bilaterally  Heart:  regular rate and rhythm  Abdomen:  abdomen is soft without significant tenderness, masses, organomegaly or guarding  Extremities:  Clean bandage to R thigh, no edema    Data Review:     Labs: Results:       Chemistry Recent Labs     12/24/18  0510 12/23/18  0300 12/22/18  0255   * 112* 145*    140 146*   K 4.0 3.8 3.8    106 111*   CO2 30 28 28   BUN 7 7 7   CREA 0.51* 0.55* 0.55*   CA 7.7* 7.6* 7.8*   MG  --   --  2.0   AGAP 6 6 7   BUCR 14 13 13      CBC w/Diff Recent Labs     12/24/18  0510 12/23/18  1354 12/23/18  0300 12/22/18  0255   WBC 5.3  --  7.6 5.4   RBC 3.58*  --  3.49* 3.96*   HGB 7.9* 10.2* 7.2* 8.3*   HCT 25.4* 33.0* 24.3* 26.8*     --  271 228   GRANS 66  --  63 56   LYMPH 19*  --  26 29   EOS 3  --  1 0      Cardiac Enzymes Lab Results   Component Value Date/Time     (H) 12/24/2018 05:10 AM      Coagulation Recent Labs     12/24/18  0510 12/24/18  0040   APTT 87.3* 103.1*       Lipid Panel No results found for: CHOL, CHOLPOCT, CHOLX, CHLST, CHOLV, 626370, HDL, LDL, LDLC, DLDLP, 004348, VLDLC, VLDL, TGLX, TRIGL, TRIGP, TGLPOCT, CHHD, CHHDX   BNP No results found for: BNP, BNPP, XBNPT   Liver Enzymes No results for input(s): TP, ALB, TBIL, AP, SGOT, GPT in the last 72 hours.     No lab exists for component: DBIL   Digoxin    Thyroid Studies No results found for: T4, T3U, TSH, TSHEXT       Signed By: Brendon Giraldo PA-C     December 24, 2018

## 2018-12-24 NOTE — PROGRESS NOTES
Problem: Falls - Risk of  Goal: *Absence of Falls  Document Kiko Fall Risk and appropriate interventions in the flowsheet. Outcome: Progressing Towards Goal  Fall Risk Interventions:  Mobility Interventions: Assess mobility with egress test, Bed/chair exit alarm, Patient to call before getting OOB         Medication Interventions: Bed/chair exit alarm, Patient to call before getting OOB    Elimination Interventions: Bed/chair exit alarm, Call light in reach, Patient to call for help with toileting needs, Toileting schedule/hourly rounds             Problem: Pressure Injury - Risk of  Goal: *Prevention of pressure injury  Document Carlo Scale and appropriate interventions in the flowsheet. Outcome: Progressing Towards Goal  Pressure Injury Interventions:  Sensory Interventions: Assess changes in LOC    Moisture Interventions: Absorbent underpads, Contain wound drainage, Internal/External urinary devices    Activity Interventions: Pressure redistribution bed/mattress(bed type)    Mobility Interventions: HOB 30 degrees or less, Pressure redistribution bed/mattress (bed type), Turn and reposition approx.  every two hours(pillow and wedges)    Nutrition Interventions: Document food/fluid/supplement intake    Friction and Shear Interventions: Apply protective barrier, creams and emollients, Feet elevated on foot rest, Foam dressings/transparent film/skin sealants, HOB 30 degrees or less, Lift sheet, Lift team/patient mobility team

## 2018-12-25 LAB
ALBUMIN SERPL-MCNC: 2.2 G/DL (ref 3.4–5)
ALBUMIN/GLOB SERPL: 0.5 {RATIO} (ref 0.8–1.7)
ALP SERPL-CCNC: 61 U/L (ref 45–117)
ALT SERPL-CCNC: 25 U/L (ref 13–56)
ANION GAP SERPL CALC-SCNC: 6 MMOL/L (ref 3–18)
AST SERPL-CCNC: 38 U/L (ref 15–37)
BASOPHILS # BLD: 0 K/UL (ref 0–0.1)
BASOPHILS NFR BLD: 0 % (ref 0–2)
BILIRUB SERPL-MCNC: 0.3 MG/DL (ref 0.2–1)
BUN SERPL-MCNC: 10 MG/DL (ref 7–18)
BUN/CREAT SERPL: 13 (ref 12–20)
CALCIUM SERPL-MCNC: 7.9 MG/DL (ref 8.5–10.1)
CHLORIDE SERPL-SCNC: 104 MMOL/L (ref 100–108)
CK SERPL-CCNC: 264 U/L (ref 26–192)
CO2 SERPL-SCNC: 28 MMOL/L (ref 21–32)
CORTIS SERPL-MCNC: 9.6 UG/DL (ref 3.09–22.4)
CREAT SERPL-MCNC: 0.77 MG/DL (ref 0.6–1.3)
DIFFERENTIAL METHOD BLD: ABNORMAL
EOSINOPHIL # BLD: 0.1 K/UL (ref 0–0.4)
EOSINOPHIL NFR BLD: 2 % (ref 0–5)
ERYTHROCYTE [DISTWIDTH] IN BLOOD BY AUTOMATED COUNT: 16.6 % (ref 11.6–14.5)
ERYTHROCYTE [DISTWIDTH] IN BLOOD BY AUTOMATED COUNT: 16.7 % (ref 11.6–14.5)
GLOBULIN SER CALC-MCNC: 4.2 G/DL (ref 2–4)
GLUCOSE BLD STRIP.AUTO-MCNC: 126 MG/DL (ref 70–110)
GLUCOSE BLD STRIP.AUTO-MCNC: 130 MG/DL (ref 70–110)
GLUCOSE BLD STRIP.AUTO-MCNC: 150 MG/DL (ref 70–110)
GLUCOSE BLD STRIP.AUTO-MCNC: 152 MG/DL (ref 70–110)
GLUCOSE SERPL-MCNC: 118 MG/DL (ref 74–99)
HCT VFR BLD AUTO: 23.4 % (ref 35–45)
HCT VFR BLD AUTO: 27.5 % (ref 35–45)
HGB BLD-MCNC: 7.1 G/DL (ref 12–16)
HGB BLD-MCNC: 8.4 G/DL (ref 12–16)
LACTATE SERPL-SCNC: 0.4 MMOL/L (ref 0.4–2)
LYMPHOCYTES # BLD: 1 K/UL (ref 0.9–3.6)
LYMPHOCYTES NFR BLD: 18 % (ref 21–52)
MCH RBC QN AUTO: 21.6 PG (ref 24–34)
MCH RBC QN AUTO: 21.8 PG (ref 24–34)
MCHC RBC AUTO-ENTMCNC: 30.3 G/DL (ref 31–37)
MCHC RBC AUTO-ENTMCNC: 30.5 G/DL (ref 31–37)
MCV RBC AUTO: 71.2 FL (ref 74–97)
MCV RBC AUTO: 71.3 FL (ref 74–97)
MONOCYTES # BLD: 0.7 K/UL (ref 0.05–1.2)
MONOCYTES NFR BLD: 13 % (ref 3–10)
NEUTS SEG # BLD: 3.7 K/UL (ref 1.8–8)
NEUTS SEG NFR BLD: 67 % (ref 40–73)
PLATELET # BLD AUTO: 283 K/UL (ref 135–420)
PLATELET # BLD AUTO: 340 K/UL (ref 135–420)
PMV BLD AUTO: 10.6 FL (ref 9.2–11.8)
PMV BLD AUTO: 11.5 FL (ref 9.2–11.8)
POTASSIUM SERPL-SCNC: 4.5 MMOL/L (ref 3.5–5.5)
PROT SERPL-MCNC: 6.4 G/DL (ref 6.4–8.2)
RBC # BLD AUTO: 3.28 M/UL (ref 4.2–5.3)
RBC # BLD AUTO: 3.86 M/UL (ref 4.2–5.3)
SODIUM SERPL-SCNC: 138 MMOL/L (ref 136–145)
WBC # BLD AUTO: 4.7 K/UL (ref 4.6–13.2)
WBC # BLD AUTO: 5.6 K/UL (ref 4.6–13.2)

## 2018-12-25 PROCEDURE — 74011636637 HC RX REV CODE- 636/637: Performed by: SURGERY

## 2018-12-25 PROCEDURE — 76937 US GUIDE VASCULAR ACCESS: CPT | Performed by: INTERNAL MEDICINE

## 2018-12-25 PROCEDURE — 80053 COMPREHEN METABOLIC PANEL: CPT

## 2018-12-25 PROCEDURE — 02HV33Z INSERTION OF INFUSION DEVICE INTO SUPERIOR VENA CAVA, PERCUTANEOUS APPROACH: ICD-10-PCS | Performed by: HOSPITALIST

## 2018-12-25 PROCEDURE — 82962 GLUCOSE BLOOD TEST: CPT

## 2018-12-25 PROCEDURE — 74011000258 HC RX REV CODE- 258: Performed by: FAMILY MEDICINE

## 2018-12-25 PROCEDURE — 82533 TOTAL CORTISOL: CPT

## 2018-12-25 PROCEDURE — 86900 BLOOD TYPING SEROLOGIC ABO: CPT

## 2018-12-25 PROCEDURE — 74011000250 HC RX REV CODE- 250: Performed by: SURGERY

## 2018-12-25 PROCEDURE — 74011000258 HC RX REV CODE- 258: Performed by: INTERNAL MEDICINE

## 2018-12-25 PROCEDURE — 77030018786 HC NDL GD F/USND BARD -B

## 2018-12-25 PROCEDURE — 82550 ASSAY OF CK (CPK): CPT

## 2018-12-25 PROCEDURE — 36430 TRANSFUSION BLD/BLD COMPNT: CPT

## 2018-12-25 PROCEDURE — 85027 COMPLETE CBC AUTOMATED: CPT

## 2018-12-25 PROCEDURE — 74011250637 HC RX REV CODE- 250/637: Performed by: FAMILY MEDICINE

## 2018-12-25 PROCEDURE — 36569 INSJ PICC 5 YR+ W/O IMAGING: CPT | Performed by: INTERNAL MEDICINE

## 2018-12-25 PROCEDURE — 85025 COMPLETE CBC W/AUTO DIFF WBC: CPT

## 2018-12-25 PROCEDURE — 74011250636 HC RX REV CODE- 250/636: Performed by: HOSPITALIST

## 2018-12-25 PROCEDURE — 74011250637 HC RX REV CODE- 250/637: Performed by: INTERNAL MEDICINE

## 2018-12-25 PROCEDURE — P9016 RBC LEUKOCYTES REDUCED: HCPCS

## 2018-12-25 PROCEDURE — 83605 ASSAY OF LACTIC ACID: CPT

## 2018-12-25 PROCEDURE — C1751 CATH, INF, PER/CENT/MIDLINE: HCPCS

## 2018-12-25 PROCEDURE — 74011250636 HC RX REV CODE- 250/636: Performed by: FAMILY MEDICINE

## 2018-12-25 PROCEDURE — 30233N1 TRANSFUSION OF NONAUTOLOGOUS RED BLOOD CELLS INTO PERIPHERAL VEIN, PERCUTANEOUS APPROACH: ICD-10-PCS | Performed by: HOSPITALIST

## 2018-12-25 PROCEDURE — 77030018719 HC DRSG PTCH ANTIMIC J&J -A

## 2018-12-25 PROCEDURE — 65610000006 HC RM INTENSIVE CARE

## 2018-12-25 PROCEDURE — 36415 COLL VENOUS BLD VENIPUNCTURE: CPT

## 2018-12-25 PROCEDURE — 77030037878 HC DRSG MEPILEX >48IN BORD MOLN -B

## 2018-12-25 PROCEDURE — 74011000250 HC RX REV CODE- 250: Performed by: INTERNAL MEDICINE

## 2018-12-25 PROCEDURE — 74011250636 HC RX REV CODE- 250/636: Performed by: INTERNAL MEDICINE

## 2018-12-25 PROCEDURE — 86923 COMPATIBILITY TEST ELECTRIC: CPT

## 2018-12-25 RX ORDER — SODIUM CHLORIDE 9 MG/ML
250 INJECTION, SOLUTION INTRAVENOUS AS NEEDED
Status: DISCONTINUED | OUTPATIENT
Start: 2018-12-25 | End: 2018-12-30

## 2018-12-25 RX ORDER — SODIUM CHLORIDE 0.9 % (FLUSH) 0.9 %
20 SYRINGE (ML) INJECTION EVERY 24 HOURS
Status: DISCONTINUED | OUTPATIENT
Start: 2018-12-25 | End: 2019-01-01 | Stop reason: HOSPADM

## 2018-12-25 RX ORDER — PHENYLEPHRINE HCL IN 0.9% NACL 30MG/250ML
10-100 PLASTIC BAG, INJECTION (ML) INTRAVENOUS
Status: DISCONTINUED | OUTPATIENT
Start: 2018-12-25 | End: 2018-12-25

## 2018-12-25 RX ORDER — SODIUM CHLORIDE 0.9 % (FLUSH) 0.9 %
10-40 SYRINGE (ML) INJECTION EVERY 8 HOURS
Status: DISCONTINUED | OUTPATIENT
Start: 2018-12-25 | End: 2019-01-01 | Stop reason: HOSPADM

## 2018-12-25 RX ORDER — SODIUM CHLORIDE 0.9 % (FLUSH) 0.9 %
10-30 SYRINGE (ML) INJECTION AS NEEDED
Status: DISCONTINUED | OUTPATIENT
Start: 2018-12-25 | End: 2019-01-01 | Stop reason: HOSPADM

## 2018-12-25 RX ORDER — BACITRACIN 500 UNIT/G
1 PACKET (EA) TOPICAL AS NEEDED
Status: DISCONTINUED | OUTPATIENT
Start: 2018-12-25 | End: 2019-01-01 | Stop reason: HOSPADM

## 2018-12-25 RX ORDER — SODIUM CHLORIDE 0.9 % (FLUSH) 0.9 %
10 SYRINGE (ML) INJECTION AS NEEDED
Status: DISCONTINUED | OUTPATIENT
Start: 2018-12-25 | End: 2019-01-01 | Stop reason: HOSPADM

## 2018-12-25 RX ORDER — SODIUM CHLORIDE 9 MG/ML
500 INJECTION, SOLUTION INTRAVENOUS CONTINUOUS
Status: DISCONTINUED | OUTPATIENT
Start: 2018-12-25 | End: 2018-12-26

## 2018-12-25 RX ORDER — SODIUM CHLORIDE 9 MG/ML
500 INJECTION, SOLUTION INTRAVENOUS ONCE
Status: COMPLETED | OUTPATIENT
Start: 2018-12-25 | End: 2018-12-25

## 2018-12-25 RX ORDER — SODIUM CHLORIDE 0.9 % (FLUSH) 0.9 %
10 SYRINGE (ML) INJECTION EVERY 24 HOURS
Status: DISCONTINUED | OUTPATIENT
Start: 2018-12-25 | End: 2019-01-01 | Stop reason: HOSPADM

## 2018-12-25 RX ADMIN — SODIUM CHLORIDE 500 ML: 900 INJECTION, SOLUTION INTRAVENOUS at 14:32

## 2018-12-25 RX ADMIN — Medication 20 ML: at 14:31

## 2018-12-25 RX ADMIN — POTASSIUM CHLORIDE 40 MEQ: 20 TABLET, EXTENDED RELEASE ORAL at 09:27

## 2018-12-25 RX ADMIN — DEXTROSE MONOHYDRATE 16 MCG/MIN: 5 INJECTION, SOLUTION INTRAVENOUS at 15:04

## 2018-12-25 RX ADMIN — CLONAZEPAM 1 MG: 0.5 TABLET ORAL at 17:28

## 2018-12-25 RX ADMIN — QUETIAPINE FUMARATE 300 MG: 300 TABLET, EXTENDED RELEASE ORAL at 23:02

## 2018-12-25 RX ADMIN — CLONAZEPAM 1 MG: 0.5 TABLET ORAL at 09:26

## 2018-12-25 RX ADMIN — LEVOFLOXACIN 750 MG: 5 INJECTION, SOLUTION INTRAVENOUS at 18:07

## 2018-12-25 RX ADMIN — Medication 10 ML: at 14:31

## 2018-12-25 RX ADMIN — NOREPINEPHRINE BITARTRATE 6 MCG/MIN: 1 INJECTION INTRAVENOUS at 04:11

## 2018-12-25 RX ADMIN — CLOPIDOGREL BISULFATE 75 MG: 75 TABLET, FILM COATED ORAL at 09:27

## 2018-12-25 RX ADMIN — Medication 10 ML: at 22:52

## 2018-12-25 RX ADMIN — SODIUM CHLORIDE 500 ML/HR: 900 INJECTION, SOLUTION INTRAVENOUS at 06:20

## 2018-12-25 RX ADMIN — INSULIN LISPRO 2 UNITS: 100 INJECTION, SOLUTION INTRAVENOUS; SUBCUTANEOUS at 12:56

## 2018-12-25 RX ADMIN — NOREPINEPHRINE BITARTRATE 16 MCG/MIN: 1 INJECTION INTRAVENOUS at 11:55

## 2018-12-25 RX ADMIN — GABAPENTIN 300 MG: 300 CAPSULE ORAL at 09:27

## 2018-12-25 RX ADMIN — CEFTRIAXONE 1 G: 1 INJECTION, POWDER, FOR SOLUTION INTRAMUSCULAR; INTRAVENOUS at 18:06

## 2018-12-25 RX ADMIN — GABAPENTIN 300 MG: 300 CAPSULE ORAL at 23:02

## 2018-12-25 RX ADMIN — ATORVASTATIN CALCIUM 80 MG: 40 TABLET, FILM COATED ORAL at 09:26

## 2018-12-25 RX ADMIN — GABAPENTIN 300 MG: 300 CAPSULE ORAL at 15:04

## 2018-12-25 RX ADMIN — OXYCODONE AND ACETAMINOPHEN 1 TABLET: 5; 325 TABLET ORAL at 19:38

## 2018-12-25 RX ADMIN — ASPIRIN 81 MG 81 MG: 81 TABLET ORAL at 09:26

## 2018-12-25 NOTE — PROGRESS NOTES
Progress Note      Patient: Janessa Canales               Sex: female          DOA: 12/20/2018       YOB: 1965      Age:  48 y.o.        LOS:  LOS: 5 days               Subjective / Interval Hx  I       Patient post Left popliteal and tibial endarterectomy, patch angioplasty. left FEMORAL below knee to POPLITEAL BYPASS yesterday PM. No fever. C/o pain in the middle toe . On Levophed per Vascular surgery . EKG was noted to have EKG abnormality. Patient denies CP.    12/23 : Episodes of hypotension noted , now resolved . On pressors Levophed. Continue to hold BP medications     12/24:Pt saying that she wants to go home  12/25:weaning pressors down    Objective:      Visit Vitals  /68   Pulse 91   Temp 98.6 °F (37 °C)   Resp 20   Ht 5' 5\" (1.651 m)   Wt 76.2 kg (167 lb 15.9 oz)   SpO2 100%   BMI 27.96 kg/m²     Physical Exam   Constitutional: She is oriented to person, place, and time. She appears well-developed and well-nourished. HENT:   Head: Normocephalic and atraumatic. Eyes: Conjunctivae are normal.   Neck: Neck supple. Cardiovascular: Normal rate, regular rhythm and normal heart sounds. No murmur heard. Pulmonary/Chest: Effort normal. She has rales. Abdominal: Soft. Bowel sounds are normal.   Musculoskeletal: She exhibits no edema. Neurological: She is alert and oriented to person, place, and time. Skin: No rash noted. No erythema. No pallor. Psychiatric: She has a normal mood and affect.      Intake and Output:  Current Shift:  12/25 0701 - 12/25 1900  In: -   Out: 700 [Urine:700]  Last three shifts:  12/23 1901 - 12/25 0700  In: 2122.7 [P.O.:940; I.V.:1182.7]  Out: 1523 [KAWHY:0954; Drains:53]    Recent Results (from the past 48 hour(s))   PTT    Collection Time: 12/23/18 11:14 AM   Result Value Ref Range    aPTT 56.9 (H) 23.0 - 36.4 SEC   GLUCOSE, POC    Collection Time: 12/23/18 11:38 AM   Result Value Ref Range    Glucose (POC) 126 (H) 70 - 110 mg/dL   HGB & HCT Collection Time: 12/23/18  1:54 PM   Result Value Ref Range    HGB 10.2 (L) 12.0 - 16.0 g/dL    HCT 33.0 (L) 35.0 - 45.0 %   PTT    Collection Time: 12/23/18  4:41 PM   Result Value Ref Range    aPTT 68.7 (H) 23.0 - 36.4 SEC   GLUCOSE, POC    Collection Time: 12/23/18  5:21 PM   Result Value Ref Range    Glucose (POC) 115 (H) 70 - 110 mg/dL   CULTURE, BLOOD    Collection Time: 12/23/18  9:20 PM   Result Value Ref Range    Special Requests: NO SPECIAL REQUESTS      Culture result: NO GROWTH 2 DAYS     LACTIC ACID    Collection Time: 12/23/18  9:20 PM   Result Value Ref Range    Lactic acid 0.8 0.4 - 2.0 MMOL/L   CULTURE, BLOOD    Collection Time: 12/23/18  9:23 PM   Result Value Ref Range    Special Requests: NO SPECIAL REQUESTS      Culture result: NO GROWTH 2 DAYS     GLUCOSE, POC    Collection Time: 12/23/18 10:34 PM   Result Value Ref Range    Glucose (POC) 129 (H) 70 - 110 mg/dL   PTT    Collection Time: 12/24/18 12:40 AM   Result Value Ref Range    aPTT 103.1 (H) 23.0 - 36.4 SEC   CBC WITH AUTOMATED DIFF    Collection Time: 12/24/18  5:10 AM   Result Value Ref Range    WBC 5.3 4.6 - 13.2 K/uL    RBC 3.58 (L) 4.20 - 5.30 M/uL    HGB 7.9 (L) 12.0 - 16.0 g/dL    HCT 25.4 (L) 35.0 - 45.0 %    MCV 70.9 (L) 74.0 - 97.0 FL    MCH 22.1 (L) 24.0 - 34.0 PG    MCHC 31.1 31.0 - 37.0 g/dL    RDW 16.3 (H) 11.6 - 14.5 %    PLATELET 471 650 - 673 K/uL    MPV 10.7 9.2 - 11.8 FL    NEUTROPHILS 66 40 - 73 %    LYMPHOCYTES 19 (L) 21 - 52 %    MONOCYTES 12 (H) 3 - 10 %    EOSINOPHILS 3 0 - 5 %    BASOPHILS 0 0 - 2 %    ABS. NEUTROPHILS 3.5 1.8 - 8.0 K/UL    ABS. LYMPHOCYTES 1.0 0.9 - 3.6 K/UL    ABS. MONOCYTES 0.6 0.05 - 1.2 K/UL    ABS. EOSINOPHILS 0.1 0.0 - 0.4 K/UL    ABS.  BASOPHILS 0.0 0.0 - 0.1 K/UL    DF AUTOMATED     CK    Collection Time: 12/24/18  5:10 AM   Result Value Ref Range     (H) 26 - 709 U/L   METABOLIC PANEL, BASIC    Collection Time: 12/24/18  5:10 AM   Result Value Ref Range    Sodium 139 136 - 145 mmol/L Potassium 4.0 3.5 - 5.5 mmol/L    Chloride 103 100 - 108 mmol/L    CO2 30 21 - 32 mmol/L    Anion gap 6 3.0 - 18 mmol/L    Glucose 105 (H) 74 - 99 mg/dL    BUN 7 7.0 - 18 MG/DL    Creatinine 0.51 (L) 0.6 - 1.3 MG/DL    BUN/Creatinine ratio 14 12 - 20      GFR est AA >60 >60 ml/min/1.73m2    GFR est non-AA >60 >60 ml/min/1.73m2    Calcium 7.7 (L) 8.5 - 10.1 MG/DL   PTT    Collection Time: 12/24/18  5:10 AM   Result Value Ref Range    aPTT 87.3 (H) 23.0 - 36.4 SEC   GLUCOSE, POC    Collection Time: 12/24/18  8:44 AM   Result Value Ref Range    Glucose (POC) 100 70 - 110 mg/dL   PTT    Collection Time: 12/24/18 12:25 PM   Result Value Ref Range    aPTT 80.9 (H) 23.0 - 36.4 SEC   HGB & HCT    Collection Time: 12/24/18 12:25 PM   Result Value Ref Range    HGB 8.5 (L) 12.0 - 16.0 g/dL    HCT 28.3 (L) 35.0 - 45.0 %   GLUCOSE, POC    Collection Time: 12/24/18 12:37 PM   Result Value Ref Range    Glucose (POC) 82 70 - 110 mg/dL   GLUCOSE, POC    Collection Time: 12/24/18  5:52 PM   Result Value Ref Range    Glucose (POC) 90 70 - 110 mg/dL   HGB & HCT    Collection Time: 12/24/18  8:45 PM   Result Value Ref Range    HGB 8.5 (L) 12.0 - 16.0 g/dL    HCT 27.3 (L) 35.0 - 45.0 %   GLUCOSE, POC    Collection Time: 12/24/18  9:44 PM   Result Value Ref Range    Glucose (POC) 107 70 - 110 mg/dL   CBC WITH AUTOMATED DIFF    Collection Time: 12/25/18  2:00 AM   Result Value Ref Range    WBC 5.6 4.6 - 13.2 K/uL    RBC 3.86 (L) 4.20 - 5.30 M/uL    HGB 8.4 (L) 12.0 - 16.0 g/dL    HCT 27.5 (L) 35.0 - 45.0 %    MCV 71.2 (L) 74.0 - 97.0 FL    MCH 21.8 (L) 24.0 - 34.0 PG    MCHC 30.5 (L) 31.0 - 37.0 g/dL    RDW 16.7 (H) 11.6 - 14.5 %    PLATELET 299 274 - 552 K/uL    MPV 11.5 9.2 - 11.8 FL    NEUTROPHILS 67 40 - 73 %    LYMPHOCYTES 18 (L) 21 - 52 %    MONOCYTES 13 (H) 3 - 10 %    EOSINOPHILS 2 0 - 5 %    BASOPHILS 0 0 - 2 %    ABS. NEUTROPHILS 3.7 1.8 - 8.0 K/UL    ABS. LYMPHOCYTES 1.0 0.9 - 3.6 K/UL    ABS.  MONOCYTES 0.7 0.05 - 1.2 K/UL ABS. EOSINOPHILS 0.1 0.0 - 0.4 K/UL    ABS. BASOPHILS 0.0 0.0 - 0.1 K/UL    DF AUTOMATED     CK    Collection Time: 12/25/18  2:00 AM   Result Value Ref Range     (H) 26 - 924 U/L   METABOLIC PANEL, COMPREHENSIVE    Collection Time: 12/25/18  2:00 AM   Result Value Ref Range    Sodium 138 136 - 145 mmol/L    Potassium 4.5 3.5 - 5.5 mmol/L    Chloride 104 100 - 108 mmol/L    CO2 28 21 - 32 mmol/L    Anion gap 6 3.0 - 18 mmol/L    Glucose 118 (H) 74 - 99 mg/dL    BUN 10 7.0 - 18 MG/DL    Creatinine 0.77 0.6 - 1.3 MG/DL    BUN/Creatinine ratio 13 12 - 20      GFR est AA >60 >60 ml/min/1.73m2    GFR est non-AA >60 >60 ml/min/1.73m2    Calcium 7.9 (L) 8.5 - 10.1 MG/DL    Bilirubin, total 0.3 0.2 - 1.0 MG/DL    ALT (SGPT) 25 13 - 56 U/L    AST (SGOT) 38 (H) 15 - 37 U/L    Alk. phosphatase 61 45 - 117 U/L    Protein, total 6.4 6.4 - 8.2 g/dL    Albumin 2.2 (L) 3.4 - 5.0 g/dL    Globulin 4.2 (H) 2.0 - 4.0 g/dL    A-G Ratio 0.5 (L) 0.8 - 1.7     GLUCOSE, POC    Collection Time: 12/25/18  9:19 AM   Result Value Ref Range    Glucose (POC) 152 (H) 70 - 110 mg/dL       Lab/Data Reviewed: All lab results for the last 24 hours reviewed. XRays were reviewed in past 24 hours    Medications Reviewed            Assessment/Plan     Active Problems:    Atherosclerotic PVD with intermittent claudication (HCC) (12/20/2018)      Hypokalemia (12/20/2018)      CAD (coronary artery disease) (12/20/2018)      DM (diabetes mellitus) ([facility] [address] (12/20/2018)      HTN (hypertension) (12/20/2018)      Depression (12/20/2018)      Current smoker (12/20/2018)      Anemia (12/22/2018)        Hypernatremia   Rhabdomyolysis       CARE PLAN:     PVD with Claudication   - Continue Heparin drip   - pain control as needed   - s/p Left popliteal and tibial endarterectomy, patch angioplasty.  left FEMORAL below knee to POPLITEAL BYPASS  - Patient on Levophed to keep SBP > 100 per vascular surgery  - Vascular Surgery on board      CAD  - Plavix  - Losartan - aspirin   - Lipitor      DM  - SSI   - A1c     HTN  - hold  losartan , Norvasc  due to hypotension on pressor      Depression   - Wellbutrin , Seroquel, clonepin      Smoker   - advise cessation   - Nicotine Patch      Hypokalemia   - replace   - resolved     Hypernatremia   - Na 146 today   - switch to 1/2NS  - resolved     Rhabdomyolysis   - non traumatic   - Aggressive IVF  - Trend CK     Anemia   - microcytic   - transfuse if < 7   - stool occult test pending     Cough   - cxr 12/22 c/w right greater than left lower lobe airspace disease or atelectasis  - lasix 20 mg IV X 1 was given on that day before cxr results known  - albuterol as needed      DM Neuropathy  - Neurontin      DVT prophylaxis        Full code       Gilbert Schaefer MD  December 25, 2018

## 2018-12-25 NOTE — ROUTINE PROCESS
1930 Bedside report received from Chris Holliday RN. Pt sitting up in bed, denies pain and nad noted. 2028 Notified Dr. Ivis Gaspar that pt's incision is still oozing. New orders for H&H    2330 Pt's left upper leg incision dressing reinforced twice. Removed to find small area of incision oozing blood. Rodriguezhermelinda Feliciano paged through the answering service    2355 Dr. Ivis Gaspar paged notified that pt's surgical site is oozing new orders for quickclot and to let vascular know what is going on    2355 Answering service called will repage Dr. Marisol Lee Notified Dr. Jodee Feliciano that pt's left upper incision is oozing blood, a order for Savannah Nichole was given but nursing supervisor and Dr. Ivis Gaspar wanted me to speak with her about it. New orders for quit clot. 0130 QuitClot applied to left upper leg incision.      2126 Report given to Elijah Dowling

## 2018-12-25 NOTE — PROGRESS NOTES
Problem: Falls - Risk of  Goal: *Absence of Falls  Document Kiko Fall Risk and appropriate interventions in the flowsheet. Outcome: Progressing Towards Goal  Fall Risk Interventions:  Mobility Interventions: Assess mobility with egress test         Medication Interventions: Assess postural VS orthostatic hypotension    Elimination Interventions: Bed/chair exit alarm             Problem: Pressure Injury - Risk of  Goal: *Prevention of pressure injury  Document Carlo Scale and appropriate interventions in the flowsheet.   Outcome: Progressing Towards Goal  Pressure Injury Interventions:  Sensory Interventions: Assess changes in LOC    Moisture Interventions: Apply protective barrier, creams and emollients    Activity Interventions: Assess need for specialty bed    Mobility Interventions: HOB 30 degrees or less    Nutrition Interventions: Document food/fluid/supplement intake    Friction and Shear Interventions: Foam dressings/transparent film/skin sealants

## 2018-12-25 NOTE — PROGRESS NOTES
[facility]  [address] [facility] [place], [address] [facility]  Dr. Lo Dempsey, Dr. Macy Pierre , Dr. Rolf Zuniga  [phone] FAX# [phone]  PROGRESS NOTE    Patient: Brittney Jones MRN: [id number]  SSN: [id number]    YOB: 1965  Age: 48 y.o. Sex: female      Date: 12/25/2018    Hospital: [address] [place] [address] Day: 4    Plan:   increase activity and out of bed  Pain control  Reinforce dressing as necessary. Monitor H/H    Assessment:   good progress and wounds fine    Subjective:   No complaints  Not required    Objective:   Admit weight: Weight: 74.8 kg (165 lb)  Last recorded weight: Weight: 76.2 kg (167 lb 15.9 oz)    Visit Vitals  /68   Pulse 91   Temp 98.5 °F (36.9 °C)   Resp 20   Ht 5' 5\" (1.651 m)   Wt 76.2 kg (167 lb 15.9 oz)   SpO2 100%   BMI 27.96 kg/m²       Intake/Output Summary (Last 24 hours) at 12/25/2018 1230  Last data filed at 12/25/2018 1100  Gross per 24 hour   Intake 1715.03 ml   Output 1555 ml   Net 160.03 ml       Physical exam was negative except for: Left leg groin incision with minor ooze. Other incsions clean and dry. Labs:     Recent Labs     12/25/18  0200 12/24/18  2045 12/24/18  1225 12/24/18  0510  12/23/18  0300   WBC 5.6  --   --  5.3  --  7.6   HGB 8.4* 8.5* 8.5* 7.9*   < > 7.2*   HCT 27.5* 27.3* 28.3* 25.4*   < > 24.3*     --   --  242  --  271   RDW 16.7*  --   --  16.3*  --  15.0*    < > = values in this interval not displayed. Recent Labs     12/25/18  0200 12/24/18  0510 12/23/18  0300    139 140   K 4.5 4.0 3.8    103 106   CO2 28 30 28   * 105* 112*   BUN 10 7 7   CREA 0.77 0.51* 0.55*   CA 7.9* 7.7* 7.6*   ALB 2.2*  --   --    SGOT 38*  --   --    ALT 25  --   --      No results for input(s): PH, PCO2, PO2, HCO3, FIO2 in the last 72 hours.       Salome Morrissey MD, FACS

## 2018-12-25 NOTE — PROGRESS NOTES
Problem: Falls - Risk of  Goal: *Absence of Falls  Document Kiko Fall Risk and appropriate interventions in the flowsheet. Outcome: Progressing Towards Goal  Fall Risk Interventions:  Mobility Interventions: Assess mobility with egress test         Medication Interventions: Assess postural VS orthostatic hypotension    Elimination Interventions: Bed/chair exit alarm, Call light in reach, Patient to call for help with toileting needs             Problem: Pressure Injury - Risk of  Goal: *Prevention of pressure injury  Document Carlo Scale and appropriate interventions in the flowsheet.   Outcome: Progressing Towards Goal  Pressure Injury Interventions:  Sensory Interventions: Assess changes in LOC    Moisture Interventions: Apply protective barrier, creams and emollients    Activity Interventions: Assess need for specialty bed    Mobility Interventions: HOB 30 degrees or less, Pressure redistribution bed/mattress (bed type)    Nutrition Interventions: Document food/fluid/supplement intake    Friction and Shear Interventions: Foam dressings/transparent film/skin sealants, Apply protective barrier, creams and emollients

## 2018-12-25 NOTE — ROUTINE PROCESS
Pt received after bedside shift report from [address] [facility]. Pt awake. VSS via monitor. Respirations even and unlabored. Levopphed infusing at 16 mcg/min. Incisions clean,dry and intact. Both STACI drains intact. Pulses audible with doppler. Ramos draining to gravity with clear yellow urine. Bed locked and in low position. Call bell in reach. Will monitor closely. 1230 Linens soiled. Gown and linens changed. Eric paged for PICC line order. Awaiting call back    1250. Call back received from [facility] order to resume dopamine. Added ok to restart dopamine due to low blood pressure. 1400 PICC nurse at bedside. Bedside shift change report given to Sherita Foreman RN (oncoming nurse) by Chantal Lind RN (offgoing nurse). Report included the following information SBAR, MAR, KARDEX AND RECENT RESULTS.

## 2018-12-25 NOTE — CONSULTS
[facility]  Pulmonary, Critical Care, and Sleep Medicine    Name: Emma Garcia MRN: [id number]  : 1965 Hospital: [address] [place] [address]  Date: 2018       [facility] Initial Patient Consult                                              Consult requesting physician: Elysia Bunch  Reason for Consult: ICU management    I have reviewed the flowsheet and notes. Events, vitals, medications and notes from last 24 hours reviewed. Care plan discussed with staff    History of Present Illness:    Emma Garcia has been seen and evaluated in ICU/ER. Patient is a 48 y.o. female with PMHx significant for HTN, DM, PVD and CAD who presented with L leg pain. She had recently underwent atherectomy and PTA of L leg. She was placed on heparin and had a L fem pop bypass on . Her post surgical course was been complicated by post op hypotension. She has been requiring pressors the last 2 days (phenylephrine, dopamine, and levophed in some combination). I was called today to evaluate for persistent hypotension. On my evaluation she is somnolent but answering questions. BPs are 90s/70s via cuff on 16mcg of levophed. She is being bolused 1L of NS. She is breathing normally on RA.          Patient Active Problem List   Diagnosis Code    Atherosclerotic PVD with intermittent claudication (HCC) I70.219    Hypokalemia E87.6    CAD (coronary artery disease) I25.10    DM (diabetes mellitus) ([facility] [address] E11.9    HTN (hypertension) I10    Depression F32.9    Current smoker F17.200    Anemia D64.9     Assessment:  PVD s/p L fem pop bypass  - , drains in place  Hypotension  - likely hemorrhagic and/or hypovolemic post op  - on levophed  - goal  per vascular  Anemia  - HBG 8.4  - minimal fluid from drains, leg soft, good pulses  Hx of CAD  - preop echo WNL  DM  Depression    Impression/Plan  - D/C antihypertensives  - Add vasopressin, continue levophed  - Agree with bolus  - Will check CBC, lactic acid and random cortisol  - monitor UOP and Cr  - heparin gtt, plavix  - Okay to continue ABX, Cx with NGTD    Code status: Full code      OTHER:  Glycemic Control. Glucose stabilizer per ICU protocol when on insulin drip. Maintain blood glucose 140-180. Replace electrolytes per ICU electrolyte replacement protocol  Sepsis bundle and protocol followed. Follow serial lactic acid when >2, fluid resuscitation. Draw cultures before antibiotic. Follow cultures. Antibiotic choice. Administer antibiotic within 1 hr ICU admission and 3 hours of ED arrival. Deescalate antibiotic when appropriate. Vasopressor when appropriate with MAP goal >65 mmHg. Central Line bundle followed, remove when not needed. Large bore IV line or CVP when appropriate. Melchor bundle followed, remove melchor catheter when not critically ill. Skin & Wound care. PT/OT eval and treat. OOB/IS when appropriate. Further recommendations will be based on the patient's response to recommended treatment and results of the investigation ordered. Quality Care: Stress ulcer prophylaxis, DVT prophylaxis, HOB elevated, Infection control all reviewed and addressed. Events and notes from last 24 hours reviewed. Care plan discussed with nursing. D/w patient and family: above medical problems and answered all questions to their satisfaction. See my orders for detail. CC TIME: 45 min  Further management depending on test results and work up as outlined above. Review of Systems:  A comprehensive review of systems was negative.   Medication Reviewed    No Known Allergies   Past Medical History:   Diagnosis Date    Arthritis     Asthma     Depression     Hypertension     Unspecified sleep apnea       Past Surgical History:   Procedure Laterality Date    HX  SECTION      HX HYSTERECTOMY      HX ORTHOPAEDIC      \"plate in R arm\"    HX SHOULDER ARTHROSCOPY Left       Social History     Tobacco Use    Smoking status: Current Every Day Smoker     Packs/day: 0.25    Smokeless tobacco: Current User   Substance Use Topics    Alcohol use: No      Family History   Problem Relation Age of Onset    Diabetes Mother     Hypertension Mother     Heart Disease Mother       Prior to Admission medications    Medication Sig Start Date End Date Taking? Authorizing Provider   gabapentin (NEURONTIN) 300 mg capsule Take 300 mg by mouth three (3) times daily. Yes Other, MD Cristy   buPROPion (WELLBUTRIN) 100 mg tablet Take  by mouth. Yes Other, MD Cristy   losartan (COZAAR) 50 mg tablet Take  by mouth daily. Yes Other, MD Cristy   atorvastatin (LIPITOR) 20 mg tablet Take  by mouth daily. Yes Other, MD Cristy   aspirin 81 mg chewable tablet 81 mg. 7/2/16  Yes Other, MD Cristy   clopidogrel (PLAVIX) 75 mg tab 75 mg. 12/9/16  Yes Other, MD Cristy   linagliptin (TRADJENTA) 5 mg tablet 5 mg. 3/8/17  Yes Other, MD Cristy   amLODIPine (NORVASC) 2.5 mg tablet 2.5 mg. 4/22/17  Yes Other, MD Cristy   nitroglycerin (NITROSTAT) 0.4 mg SL tablet 0.4 mg. 12/9/16  Yes Other, MD Cristy   clonazePAM (KLONOPIN) 1 mg tablet 1 mg. Other, MD Cristy   vortioxetine (TRINTELLIX) 10 mg tablet 10 mg. Sandra, MD Cristy   QUEtiapine SR (SEROQUEL XR) 300 mg sr tablet Take 300 mg by mouth nightly. Provider, Historical   oxycodone-acetaminophen (PERCOCET) 5-325 mg per tablet Take 1-2 tablets by mouth every six (6) hours as needed for Pain. 11/4/14   James Alcaraz MD   zolpidem (AMBIEN) 10 mg tablet Take 10 mg by mouth nightly as needed. Other, MD Cristy   albuterol (PROVENTIL HFA, VENTOLIN HFA) 90 mcg/actuation inhaler Take  by inhalation.     Cristy Flores MD     Current Facility-Administered Medications   Medication Dose Route Frequency    0.9% sodium chloride infusion  500 mL/hr IntraVENous CONTINUOUS    NOREPINephrine (LEVOPHED) 16 mg in dextrose 5% 250 mL infusion  2-16 mcg/min IntraVENous TITRATE    sodium chloride (NS) flush 10 mL  10 mL InterCATHeter Q24H    sodium chloride (NS) flush 10-40 mL  10-40 mL InterCATHeter Q8H    sodium chloride (NS) flush 20 mL  20 mL InterCATHeter Q24H    vasopressin (VASOSTRICT) 20 Units in 0.9% sodium chloride 100 mL infusion  0.04 Units/min IntraVENous CONTINUOUS    insulin lispro (HUMALOG) injection   SubCUTAneous AC&HS    cefTRIAXone (ROCEPHIN) 1 g in 0.9% sodium chloride (MBP/ADV) 50 mL MBP  1 g IntraVENous Q24H    levoFLOXacin (LEVAQUIN) 750 mg in D5W IVPB  750 mg IntraVENous Q24H    influenza vaccine 2018-19 (6 mos+)(PF) (FLUARIX QUAD/FLULAVAL QUAD) injection 0.5 mL  0.5 mL IntraMUSCular PRIOR TO DISCHARGE    potassium chloride (K-DUR, KLOR-CON) SR tablet 40 mEq  40 mEq Oral DAILY    amLODIPine (NORVASC) tablet 2.5 mg  2.5 mg Oral DAILY    atorvastatin (LIPITOR) tablet 80 mg  80 mg Oral DAILY    clonazePAM (KlonoPIN) tablet 1 mg  1 mg Oral BID    clopidogrel (PLAVIX) tablet 75 mg  75 mg Oral DAILY    gabapentin (NEURONTIN) capsule 300 mg  300 mg Oral TID    QUEtiapine SR (SEROquel XR) tablet 300 mg  300 mg Oral QHS       Lines: All central lines examined by me. No signs of erythema, induration, discharge. Feeding Tube:                        Peripherally Inserted Central Catheter:     Central Venous Catheter:     Peripheral Intravenous Line:  Peripheral IV 12/24/18 Right Forearm (Active)   Site Assessment Clean, dry, & intact 12/25/2018  4:00 PM   Phlebitis Assessment 0 12/25/2018  4:00 PM   Infiltration Assessment 0 12/25/2018  4:00 PM   Dressing Status Clean, dry, & intact 12/25/2018  4:00 PM   Dressing Type Transparent;Tape 12/25/2018  4:00 PM   Hub Color/Line Status Pink;Patent; Flushed 12/25/2018  4:00 PM   Action Taken Open ports on tubing capped 12/25/2018  4:00 PM   Alcohol Cap Used Yes 12/25/2018  4:00 PM     Arterial Line:     Drain(s):  Perez-Carrera Drain 12/21/18 Right Groin (Active)   Site Assessment Clean, dry, & intact 12/25/2018 12:00 PM   Dressing Status Clean, dry, & intact 12/25/2018 12:00 PM   Status Patent; Charged;Draining 2018 12:00 PM   Drainage Color Serosanguinous 2018 12:00 PM   Output (ml) 12 ml 2018 12:00 PM       Perez-Carrera Drain 18 Left Groin (Active)   Site Assessment Clean, dry, & intact 2018 12:00 PM   Dressing Status Clean, dry, & intact 2018 12:00 PM   Status Patent; Charged;Draining 2018 12:00 PM   Drainage Color Serosanguinous 2018 12:00 PM   Output (ml) 7 ml 2018 12:00 PM     Airway:       Objective:  Vital Signs:    Visit Vitals  BP (!) 120/99   Pulse (!) 105   Temp 97.7 °F (36.5 °C)   Resp 24   Ht 5' 5\" (1.651 m)   Wt 76.2 kg (167 lb 15.9 oz)   SpO2 100%   BMI 27.96 kg/m²      O2 Device: Nasal cannula  O2 Flow Rate (L/min): 3 l/min  Temp (24hrs), Av.3 °F (37.4 °C), Min:97.7 °F (36.5 °C), Max:102.9 °F (39.4 °C)      Intake/Output:   Last shift:       0701 -  1900  In: 2485.9 [P.O.:360; I.V.:2125.9]  Out: 463 [Urine:805; Drains:19]      Intake/Output Summary (Last 24 hours) at 2018 1638  Last data filed at 2018 1600  Gross per 24 hour   Intake 3339.35 ml   Output 1504 ml   Net 1835.35 ml     Physical Exam:   General/Neurology: Somnolent, easily awakens and answers questions  Head:   Normocephalic, without obvious abnormality  Eye:   PERRL, EOM intact, no scleral icterus, no pallor  Oral:   Mucus membranes moist  Neck:   Supple  Lung:   B/l air entry is fair, no wheezing or rales  Heart:   Mild tachycardia, regular. S1 S2 present.   Abdomen: Soft, non tender, BS+nt  Extremities:  Incision to L leg clean, drains in place, soft  Skin:   Dry, intact    Data:      Recent Results (from the past 24 hour(s))   GLUCOSE, POC    Collection Time: 18  5:52 PM   Result Value Ref Range    Glucose (POC) 90 70 - 110 mg/dL   HGB & HCT    Collection Time: 18  8:45 PM   Result Value Ref Range    HGB 8.5 (L) 12.0 - 16.0 g/dL    HCT 27.3 (L) 35.0 - 45.0 %   GLUCOSE, POC    Collection Time: 18  9:44 PM   Result Value Ref Range    Glucose (POC) 107 70 - 110 mg/dL   CBC WITH AUTOMATED DIFF    Collection Time: 12/25/18  2:00 AM   Result Value Ref Range    WBC 5.6 4.6 - 13.2 K/uL    RBC 3.86 (L) 4.20 - 5.30 M/uL    HGB 8.4 (L) 12.0 - 16.0 g/dL    HCT 27.5 (L) 35.0 - 45.0 %    MCV 71.2 (L) 74.0 - 97.0 FL    MCH 21.8 (L) 24.0 - 34.0 PG    MCHC 30.5 (L) 31.0 - 37.0 g/dL    RDW 16.7 (H) 11.6 - 14.5 %    PLATELET 402 413 - 221 K/uL    MPV 11.5 9.2 - 11.8 FL    NEUTROPHILS 67 40 - 73 %    LYMPHOCYTES 18 (L) 21 - 52 %    MONOCYTES 13 (H) 3 - 10 %    EOSINOPHILS 2 0 - 5 %    BASOPHILS 0 0 - 2 %    ABS. NEUTROPHILS 3.7 1.8 - 8.0 K/UL    ABS. LYMPHOCYTES 1.0 0.9 - 3.6 K/UL    ABS. MONOCYTES 0.7 0.05 - 1.2 K/UL    ABS. EOSINOPHILS 0.1 0.0 - 0.4 K/UL    ABS. BASOPHILS 0.0 0.0 - 0.1 K/UL    DF AUTOMATED     CK    Collection Time: 12/25/18  2:00 AM   Result Value Ref Range     (H) 26 - 577 U/L   METABOLIC PANEL, COMPREHENSIVE    Collection Time: 12/25/18  2:00 AM   Result Value Ref Range    Sodium 138 136 - 145 mmol/L    Potassium 4.5 3.5 - 5.5 mmol/L    Chloride 104 100 - 108 mmol/L    CO2 28 21 - 32 mmol/L    Anion gap 6 3.0 - 18 mmol/L    Glucose 118 (H) 74 - 99 mg/dL    BUN 10 7.0 - 18 MG/DL    Creatinine 0.77 0.6 - 1.3 MG/DL    BUN/Creatinine ratio 13 12 - 20      GFR est AA >60 >60 ml/min/1.73m2    GFR est non-AA >60 >60 ml/min/1.73m2    Calcium 7.9 (L) 8.5 - 10.1 MG/DL    Bilirubin, total 0.3 0.2 - 1.0 MG/DL    ALT (SGPT) 25 13 - 56 U/L    AST (SGOT) 38 (H) 15 - 37 U/L    Alk.  phosphatase 61 45 - 117 U/L    Protein, total 6.4 6.4 - 8.2 g/dL    Albumin 2.2 (L) 3.4 - 5.0 g/dL    Globulin 4.2 (H) 2.0 - 4.0 g/dL    A-G Ratio 0.5 (L) 0.8 - 1.7     GLUCOSE, POC    Collection Time: 12/25/18  9:19 AM   Result Value Ref Range    Glucose (POC) 152 (H) 70 - 110 mg/dL   GLUCOSE, POC    Collection Time: 12/25/18 12:05 PM   Result Value Ref Range    Glucose (POC) 150 (H) 70 - 110 mg/dL   CBC W/O DIFF    Collection Time: 12/25/18  2:50 PM   Result Value Ref Range    WBC 4.7 4.6 - 13.2 K/uL    RBC 3.28 (L) 4.20 - 5.30 M/uL    HGB 7.1 (L) 12.0 - 16.0 g/dL    HCT 23.4 (L) 35.0 - 45.0 %    MCV 71.3 (L) 74.0 - 97.0 FL    MCH 21.6 (L) 24.0 - 34.0 PG    MCHC 30.3 (L) 31.0 - 37.0 g/dL    RDW 16.6 (H) 11.6 - 14.5 %    PLATELET 134 867 - 528 K/uL    MPV 10.6 9.2 - 11.8 FL   LACTIC ACID    Collection Time: 12/25/18  2:50 PM   Result Value Ref Range    Lactic acid 0.4 0.4 - 2.0 MMOL/L         Chemistry   Recent Labs     12/25/18  0200 12/24/18  0510 12/23/18  0300   * 105* 112*    139 140   K 4.5 4.0 3.8    103 106   CO2 28 30 28   BUN 10 7 7   CREA 0.77 0.51* 0.55*   CA 7.9* 7.7* 7.6*   AGAP 6 6 6   BUCR 13 14 13   AP 61  --   --    TP 6.4  --   --    ALB 2.2*  --   --    GLOB 4.2*  --   --    AGRAT 0.5*  --   --        CBC w/Diff   Recent Labs     12/25/18  1450 12/25/18  0200 12/24/18  2045  12/24/18  0510  12/23/18  0300   WBC 4.7 5.6  --   --  5.3  --  7.6   RBC 3.28* 3.86*  --   --  3.58*  --  3.49*   HGB 7.1* 8.4* 8.5*   < > 7.9*   < > 7.2*   HCT 23.4* 27.5* 27.3*   < > 25.4*   < > 24.3*    340  --   --  242  --  271   GRANS  --  67  --   --  66  --  63   LYMPH  --  18*  --   --  19*  --  26   EOS  --  2  --   --  3  --  1    < > = values in this interval not displayed. ABG  No results for input(s): PHI, PHI, POC2, PCO2I, PO2, PO2I, HCO3, HCO3I, FIO2, FIO2I in the last 72 hours. Micro     Recent Labs     12/23/18 2123 12/23/18 2120   CULT NO GROWTH 2 DAYS NO GROWTH 2 DAYS     Recent Labs     12/23/18 2123 12/23/18 2120   CULT NO GROWTH 2 DAYS NO GROWTH 2 DAYS       CT (Most Recent)    Results from [place] encounter on 03/12/13   CT HEAD WO CONT    Narrative Cranial CT:    Indication:    Numbness in the left arm and leg. Technique and findings:    Axial noncontrasted CT from skull base to the vertex shows ventricles are  normal in size and midline in position.  No evidence of mass or acute  intracranial hemorrhage. No definite evidence of an acute infarct. The  calvarium is essentially unremarkable. Impression Impression:    Normal noncontrast head CT. NOTE:    Please note : In appropriate clinical setting, MRI is more sensitive than CT in  detection of acute ischemia/ infarct. .       XR (Most Recent). CXR reviewed by me and compared with previous CXR   Results from Hospital Encounter encounter on 12/20/18   XR CHEST PORT    Narrative EXAM: AP radiograph of the chest    INDICATION: Elevated temperature after procedure    COMPARISON: None    _______________    FINDINGS:    Normal heart size and mediastinal contour. There is right greater than left  lower lobe opacity no pleural effusion or pneumothorax. No acute osseous  abnormalities. _______________      Impression IMPRESSION:    Right greater than left lower lobe airspace disease or atelectasis         High complexity decision making was performed during the evaluation of this patient at high risk for decompensation with multiple organ involvement     Above mentioned total time spent on reviewing the case/medical record/data/notes/EMR/patient examination/documentation/coordinating care with nurse/consultants, exclusive of procedures with complex decision making performed and > 50% time spent in face to face evaluation.     Tricia Lewis MD  12/25/2018

## 2018-12-26 LAB
ABO + RH BLD: NORMAL
ALBUMIN SERPL-MCNC: 2.5 G/DL (ref 3.4–5)
ALBUMIN/GLOB SERPL: 0.5 {RATIO} (ref 0.8–1.7)
ALP SERPL-CCNC: 70 U/L (ref 45–117)
ALT SERPL-CCNC: 33 U/L (ref 13–56)
ANION GAP SERPL CALC-SCNC: 8 MMOL/L (ref 3–18)
AST SERPL-CCNC: 53 U/L (ref 15–37)
BASOPHILS # BLD: 0 K/UL (ref 0–0.1)
BASOPHILS NFR BLD: 0 % (ref 0–2)
BILIRUB SERPL-MCNC: 0.8 MG/DL (ref 0.2–1)
BLD PROD TYP BPU: NORMAL
BLOOD GROUP ANTIBODIES SERPL: NORMAL
BPU ID: NORMAL
BUN SERPL-MCNC: 9 MG/DL (ref 7–18)
BUN/CREAT SERPL: 14 (ref 12–20)
CA-I SERPL-SCNC: 1.07 MMOL/L (ref 1.12–1.32)
CA-I SERPL-SCNC: 1.08 MMOL/L (ref 1.12–1.32)
CALCIUM SERPL-MCNC: 8.1 MG/DL (ref 8.5–10.1)
CHLORIDE SERPL-SCNC: 103 MMOL/L (ref 100–108)
CO2 SERPL-SCNC: 26 MMOL/L (ref 21–32)
CREAT SERPL-MCNC: 0.63 MG/DL (ref 0.6–1.3)
CROSSMATCH RESULT,%XM: NORMAL
DIFFERENTIAL METHOD BLD: ABNORMAL
EOSINOPHIL # BLD: 0.1 K/UL (ref 0–0.4)
EOSINOPHIL NFR BLD: 2 % (ref 0–5)
ERYTHROCYTE [DISTWIDTH] IN BLOOD BY AUTOMATED COUNT: 16.9 % (ref 11.6–14.5)
GLOBULIN SER CALC-MCNC: 4.7 G/DL (ref 2–4)
GLUCOSE BLD STRIP.AUTO-MCNC: 121 MG/DL (ref 70–110)
GLUCOSE BLD STRIP.AUTO-MCNC: 124 MG/DL (ref 70–110)
GLUCOSE BLD STRIP.AUTO-MCNC: 154 MG/DL (ref 70–110)
GLUCOSE BLD STRIP.AUTO-MCNC: 156 MG/DL (ref 70–110)
GLUCOSE SERPL-MCNC: 120 MG/DL (ref 74–99)
HBA1C MFR BLD: 5.9 % (ref 4.2–5.6)
HCT VFR BLD AUTO: 27.5 % (ref 35–45)
HGB BLD-MCNC: 8.7 G/DL (ref 12–16)
LYMPHOCYTES # BLD: 1.4 K/UL (ref 0.9–3.6)
LYMPHOCYTES NFR BLD: 19 % (ref 21–52)
MAGNESIUM SERPL-MCNC: 2 MG/DL (ref 1.6–2.6)
MCH RBC QN AUTO: 22.8 PG (ref 24–34)
MCHC RBC AUTO-ENTMCNC: 31.6 G/DL (ref 31–37)
MCV RBC AUTO: 72 FL (ref 74–97)
MONOCYTES # BLD: 0.7 K/UL (ref 0.05–1.2)
MONOCYTES NFR BLD: 10 % (ref 3–10)
NEUTS SEG # BLD: 5 K/UL (ref 1.8–8)
NEUTS SEG NFR BLD: 69 % (ref 40–73)
PHOSPHATE SERPL-MCNC: 4 MG/DL (ref 2.5–4.9)
PLATELET # BLD AUTO: 268 K/UL (ref 135–420)
PMV BLD AUTO: 11 FL (ref 9.2–11.8)
POTASSIUM SERPL-SCNC: 4 MMOL/L (ref 3.5–5.5)
POTASSIUM SERPL-SCNC: 4.2 MMOL/L (ref 3.5–5.5)
PROT SERPL-MCNC: 7.2 G/DL (ref 6.4–8.2)
RBC # BLD AUTO: 3.82 M/UL (ref 4.2–5.3)
SODIUM SERPL-SCNC: 137 MMOL/L (ref 136–145)
SPECIMEN EXP DATE BLD: NORMAL
STATUS OF UNIT,%ST: NORMAL
UNIT DIVISION, %UDIV: 0
WBC # BLD AUTO: 7.3 K/UL (ref 4.6–13.2)

## 2018-12-26 PROCEDURE — 83735 ASSAY OF MAGNESIUM: CPT

## 2018-12-26 PROCEDURE — 65610000006 HC RM INTENSIVE CARE

## 2018-12-26 PROCEDURE — 74011000258 HC RX REV CODE- 258: Performed by: FAMILY MEDICINE

## 2018-12-26 PROCEDURE — 82330 ASSAY OF CALCIUM: CPT

## 2018-12-26 PROCEDURE — 74011250636 HC RX REV CODE- 250/636: Performed by: FAMILY MEDICINE

## 2018-12-26 PROCEDURE — 74011250637 HC RX REV CODE- 250/637: Performed by: INTERNAL MEDICINE

## 2018-12-26 PROCEDURE — 74011000250 HC RX REV CODE- 250: Performed by: INTERNAL MEDICINE

## 2018-12-26 PROCEDURE — 84132 ASSAY OF SERUM POTASSIUM: CPT

## 2018-12-26 PROCEDURE — 74011250636 HC RX REV CODE- 250/636: Performed by: INTERNAL MEDICINE

## 2018-12-26 PROCEDURE — 36592 COLLECT BLOOD FROM PICC: CPT

## 2018-12-26 PROCEDURE — 83036 HEMOGLOBIN GLYCOSYLATED A1C: CPT

## 2018-12-26 PROCEDURE — 74011636637 HC RX REV CODE- 636/637: Performed by: SURGERY

## 2018-12-26 PROCEDURE — 74011250636 HC RX REV CODE- 250/636: Performed by: NURSE PRACTITIONER

## 2018-12-26 PROCEDURE — 74011000258 HC RX REV CODE- 258: Performed by: INTERNAL MEDICINE

## 2018-12-26 PROCEDURE — 74011250637 HC RX REV CODE- 250/637: Performed by: FAMILY MEDICINE

## 2018-12-26 PROCEDURE — 84100 ASSAY OF PHOSPHORUS: CPT

## 2018-12-26 PROCEDURE — 82962 GLUCOSE BLOOD TEST: CPT

## 2018-12-26 PROCEDURE — 85025 COMPLETE CBC W/AUTO DIFF WBC: CPT

## 2018-12-26 RX ORDER — AMOXICILLIN 250 MG
1 CAPSULE ORAL DAILY
Status: DISCONTINUED | OUTPATIENT
Start: 2018-12-27 | End: 2019-01-01 | Stop reason: HOSPADM

## 2018-12-26 RX ORDER — LEVOFLOXACIN 750 MG/1
750 TABLET ORAL EVERY 24 HOURS
Status: DISCONTINUED | OUTPATIENT
Start: 2018-12-26 | End: 2019-01-01 | Stop reason: HOSPADM

## 2018-12-26 RX ORDER — HEPARIN SODIUM 5000 [USP'U]/ML
5000 INJECTION, SOLUTION INTRAVENOUS; SUBCUTANEOUS EVERY 8 HOURS
Status: DISCONTINUED | OUTPATIENT
Start: 2018-12-26 | End: 2019-01-01 | Stop reason: HOSPADM

## 2018-12-26 RX ADMIN — ONDANSETRON 4 MG: 2 INJECTION INTRAMUSCULAR; INTRAVENOUS at 08:32

## 2018-12-26 RX ADMIN — GABAPENTIN 300 MG: 300 CAPSULE ORAL at 08:46

## 2018-12-26 RX ADMIN — INSULIN LISPRO 2 UNITS: 100 INJECTION, SOLUTION INTRAVENOUS; SUBCUTANEOUS at 17:41

## 2018-12-26 RX ADMIN — LEVOFLOXACIN 750 MG: 750 TABLET, FILM COATED ORAL at 17:42

## 2018-12-26 RX ADMIN — Medication 10 ML: at 15:11

## 2018-12-26 RX ADMIN — CLOPIDOGREL BISULFATE 75 MG: 75 TABLET, FILM COATED ORAL at 08:47

## 2018-12-26 RX ADMIN — CLONAZEPAM 1 MG: 0.5 TABLET ORAL at 08:47

## 2018-12-26 RX ADMIN — ATORVASTATIN CALCIUM 80 MG: 40 TABLET, FILM COATED ORAL at 08:47

## 2018-12-26 RX ADMIN — CEFTRIAXONE 1 G: 1 INJECTION, POWDER, FOR SOLUTION INTRAMUSCULAR; INTRAVENOUS at 19:57

## 2018-12-26 RX ADMIN — HEPARIN SODIUM 5000 UNITS: 5000 INJECTION INTRAVENOUS; SUBCUTANEOUS at 22:12

## 2018-12-26 RX ADMIN — Medication 10 ML: at 07:21

## 2018-12-26 RX ADMIN — QUETIAPINE FUMARATE 300 MG: 300 TABLET, EXTENDED RELEASE ORAL at 22:12

## 2018-12-26 RX ADMIN — POTASSIUM CHLORIDE 40 MEQ: 20 TABLET, EXTENDED RELEASE ORAL at 08:50

## 2018-12-26 RX ADMIN — INSULIN LISPRO 2 UNITS: 100 INJECTION, SOLUTION INTRAVENOUS; SUBCUTANEOUS at 11:39

## 2018-12-26 RX ADMIN — AMLODIPINE BESYLATE 2.5 MG: 5 TABLET ORAL at 08:48

## 2018-12-26 RX ADMIN — Medication 10 ML: at 22:13

## 2018-12-26 RX ADMIN — DEXTROSE MONOHYDRATE 5 MCG/MIN: 5 INJECTION, SOLUTION INTRAVENOUS at 05:15

## 2018-12-26 RX ADMIN — CALCIUM GLUCONATE 2 G: 94 INJECTION, SOLUTION INTRAVENOUS at 11:33

## 2018-12-26 RX ADMIN — HEPARIN SODIUM 5000 UNITS: 5000 INJECTION INTRAVENOUS; SUBCUTANEOUS at 15:11

## 2018-12-26 RX ADMIN — Medication 20 ML: at 15:11

## 2018-12-26 RX ADMIN — DEXTROSE MONOHYDRATE 8 MCG/MIN: 5 INJECTION, SOLUTION INTRAVENOUS at 14:56

## 2018-12-26 NOTE — PROGRESS NOTES
Day #4 of ABX therapy    Indication:  Sepsis  Current regimen:    - CTX 1 gm IV every 24 hours  -  mg IV every 24 hours    Recent Labs     18  0905 18  0315 18  1450 18  0200 18  0510   WBC  --  7.3 4.7 5.6 5.3   CREA 0.63  --   --  0.77 0.51*   BUN 9  --   --  10 7     Est CrCl: > 100 ml/min  Temp (24hrs), Av.3 °F (37.4 °C), Min:97.7 °F (36.5 °C), Max:100.1 °F (37.8 °C)    Cultures: Blood - NG - final    Plan:   - Continue ceftriaxone - not renally dosed  - Change levofloxacin to oral formulation  - Consider streamlining antibiotic therapy if patient is clinically stable    Please call pharmacy for questions.     Thanks,  Soco Jo, PHARMD

## 2018-12-26 NOTE — PROGRESS NOTES
Progress Note      Patient: Nitish Panchal               Sex: female          DOA: 12/20/2018       YOB: 1965      Age:  48 y.o.        LOS:  LOS: 6 days               Subjective / Interval Hx  I       Patient post Left popliteal and tibial endarterectomy, patch angioplasty. left FEMORAL below knee to POPLITEAL BYPASS yesterday PM. No fever. C/o pain in the middle toe . On Levophed per Vascular surgery . EKG was noted to have EKG abnormality. Patient denies CP.    12/23 : Episodes of hypotension noted , now resolved . On pressors Levophed. Continue to hold BP medications     12/24:Pt saying that she wants to go home  12/25:weaning off pressors;received blood transfusion      Objective:      Visit Vitals  /81   Pulse (!) 104   Temp 100 °F (37.8 °C)   Resp 25   Ht 5' 5\" (1.651 m)   Wt 76.2 kg (167 lb 15.9 oz)   SpO2 100%   BMI 27.96 kg/m²   P/E  NAD,sitting comfortably in the chair. Heent:perrla,at/nc,mouth moist.  Lungs ctab  Heart s1s2 nl,no m/g  Abdm:soft,not tender,bs present. Extr:s/p surgery,staples in place. Palpable pedis pulse rt,noted with doppler in the left. Neuro:aaox3.     Intake and Output:  Current Shift:  12/26 0701 - 12/26 1900  In: 50.4 [I.V.:50.4]  Out: 300 [Urine:300]  Last three shifts:  12/24 1901 - 12/26 0700  In: 4118.1 [P.O.:480; I.V.:3091.8]  Out: 2834 [Urine:2760; Drains:74]    Recent Results (from the past 48 hour(s))   GLUCOSE, POC    Collection Time: 12/24/18  5:52 PM   Result Value Ref Range    Glucose (POC) 90 70 - 110 mg/dL   HGB & HCT    Collection Time: 12/24/18  8:45 PM   Result Value Ref Range    HGB 8.5 (L) 12.0 - 16.0 g/dL    HCT 27.3 (L) 35.0 - 45.0 %   GLUCOSE, POC    Collection Time: 12/24/18  9:44 PM   Result Value Ref Range    Glucose (POC) 107 70 - 110 mg/dL   CBC WITH AUTOMATED DIFF    Collection Time: 12/25/18  2:00 AM   Result Value Ref Range    WBC 5.6 4.6 - 13.2 K/uL    RBC 3.86 (L) 4.20 - 5.30 M/uL    HGB 8.4 (L) 12.0 - 16.0 g/dL    HCT 27.5 (L) 35.0 - 45.0 %    MCV 71.2 (L) 74.0 - 97.0 FL    MCH 21.8 (L) 24.0 - 34.0 PG    MCHC 30.5 (L) 31.0 - 37.0 g/dL    RDW 16.7 (H) 11.6 - 14.5 %    PLATELET 702 018 - 382 K/uL    MPV 11.5 9.2 - 11.8 FL    NEUTROPHILS 67 40 - 73 %    LYMPHOCYTES 18 (L) 21 - 52 %    MONOCYTES 13 (H) 3 - 10 %    EOSINOPHILS 2 0 - 5 %    BASOPHILS 0 0 - 2 %    ABS. NEUTROPHILS 3.7 1.8 - 8.0 K/UL    ABS. LYMPHOCYTES 1.0 0.9 - 3.6 K/UL    ABS. MONOCYTES 0.7 0.05 - 1.2 K/UL    ABS. EOSINOPHILS 0.1 0.0 - 0.4 K/UL    ABS. BASOPHILS 0.0 0.0 - 0.1 K/UL    DF AUTOMATED     CK    Collection Time: 12/25/18  2:00 AM   Result Value Ref Range     (H) 26 - 276 U/L   METABOLIC PANEL, COMPREHENSIVE    Collection Time: 12/25/18  2:00 AM   Result Value Ref Range    Sodium 138 136 - 145 mmol/L    Potassium 4.5 3.5 - 5.5 mmol/L    Chloride 104 100 - 108 mmol/L    CO2 28 21 - 32 mmol/L    Anion gap 6 3.0 - 18 mmol/L    Glucose 118 (H) 74 - 99 mg/dL    BUN 10 7.0 - 18 MG/DL    Creatinine 0.77 0.6 - 1.3 MG/DL    BUN/Creatinine ratio 13 12 - 20      GFR est AA >60 >60 ml/min/1.73m2    GFR est non-AA >60 >60 ml/min/1.73m2    Calcium 7.9 (L) 8.5 - 10.1 MG/DL    Bilirubin, total 0.3 0.2 - 1.0 MG/DL    ALT (SGPT) 25 13 - 56 U/L    AST (SGOT) 38 (H) 15 - 37 U/L    Alk.  phosphatase 61 45 - 117 U/L    Protein, total 6.4 6.4 - 8.2 g/dL    Albumin 2.2 (L) 3.4 - 5.0 g/dL    Globulin 4.2 (H) 2.0 - 4.0 g/dL    A-G Ratio 0.5 (L) 0.8 - 1.7     GLUCOSE, POC    Collection Time: 12/25/18  9:19 AM   Result Value Ref Range    Glucose (POC) 152 (H) 70 - 110 mg/dL   GLUCOSE, POC    Collection Time: 12/25/18 12:05 PM   Result Value Ref Range    Glucose (POC) 150 (H) 70 - 110 mg/dL   CBC W/O DIFF    Collection Time: 12/25/18  2:50 PM   Result Value Ref Range    WBC 4.7 4.6 - 13.2 K/uL    RBC 3.28 (L) 4.20 - 5.30 M/uL    HGB 7.1 (L) 12.0 - 16.0 g/dL    HCT 23.4 (L) 35.0 - 45.0 %    MCV 71.3 (L) 74.0 - 97.0 FL    MCH 21.6 (L) 24.0 - 34.0 PG    MCHC 30.3 (L) 31.0 - 37.0 g/dL RDW 16.6 (H) 11.6 - 14.5 %    PLATELET 328 796 - 815 K/uL    MPV 10.6 9.2 - 11.8 FL   LACTIC ACID    Collection Time: 12/25/18  2:50 PM   Result Value Ref Range    Lactic acid 0.4 0.4 - 2.0 MMOL/L   CORTISOL    Collection Time: 12/25/18  3:35 PM   Result Value Ref Range    Cortisol, random 9.6 3.09 - 22.40 ug/dL   TYPE + CROSSMATCH    Collection Time: 12/25/18  5:00 PM   Result Value Ref Range    Crossmatch Expiration 12/28/2018     ABO/Rh(D) B POSITIVE     Antibody screen NEG     Unit number [id number]     Blood component type RC LR     Unit division 00     Status of unit TRANSFUSED     Crossmatch result Compatible    GLUCOSE, POC    Collection Time: 12/25/18  5:27 PM   Result Value Ref Range    Glucose (POC) 130 (H) 70 - 110 mg/dL   GLUCOSE, POC    Collection Time: 12/25/18 10:56 PM   Result Value Ref Range    Glucose (POC) 126 (H) 70 - 110 mg/dL   CBC WITH AUTOMATED DIFF    Collection Time: 12/26/18  3:15 AM   Result Value Ref Range    WBC 7.3 4.6 - 13.2 K/uL    RBC 3.82 (L) 4.20 - 5.30 M/uL    HGB 8.7 (L) 12.0 - 16.0 g/dL    HCT 27.5 (L) 35.0 - 45.0 %    MCV 72.0 (L) 74.0 - 97.0 FL    MCH 22.8 (L) 24.0 - 34.0 PG    MCHC 31.6 31.0 - 37.0 g/dL    RDW 16.9 (H) 11.6 - 14.5 %    PLATELET 197 384 - 808 K/uL    MPV 11.0 9.2 - 11.8 FL    NEUTROPHILS 69 40 - 73 %    LYMPHOCYTES 19 (L) 21 - 52 %    MONOCYTES 10 3 - 10 %    EOSINOPHILS 2 0 - 5 %    BASOPHILS 0 0 - 2 %    ABS. NEUTROPHILS 5.0 1.8 - 8.0 K/UL    ABS. LYMPHOCYTES 1.4 0.9 - 3.6 K/UL    ABS. MONOCYTES 0.7 0.05 - 1.2 K/UL    ABS. EOSINOPHILS 0.1 0.0 - 0.4 K/UL    ABS.  BASOPHILS 0.0 0.0 - 0.1 K/UL    DF AUTOMATED     MAGNESIUM    Collection Time: 12/26/18  3:15 AM   Result Value Ref Range    Magnesium 2.0 1.6 - 2.6 mg/dL   PHOSPHORUS    Collection Time: 12/26/18  3:15 AM   Result Value Ref Range    Phosphorus 4.0 2.5 - 4.9 MG/DL   POTASSIUM    Collection Time: 12/26/18  3:15 AM   Result Value Ref Range    Potassium 4.2 3.5 - 5.5 mmol/L   HEMOGLOBIN A1C W/O EAG    Collection Time: 12/26/18  3:15 AM   Result Value Ref Range    Hemoglobin A1c 5.9 (H) 4.2 - 5.6 %   CALCIUM, IONIZED    Collection Time: 12/26/18  3:20 AM   Result Value Ref Range    Ionized Calcium 1.08 (L) 1.12 - 1.32 MMOL/L   GLUCOSE, POC    Collection Time: 12/26/18  8:35 AM   Result Value Ref Range    Glucose (POC) 121 (H) 70 - 539 mg/dL   METABOLIC PANEL, COMPREHENSIVE    Collection Time: 12/26/18  9:05 AM   Result Value Ref Range    Sodium 137 136 - 145 mmol/L    Potassium 4.0 3.5 - 5.5 mmol/L    Chloride 103 100 - 108 mmol/L    CO2 26 21 - 32 mmol/L    Anion gap 8 3.0 - 18 mmol/L    Glucose 120 (H) 74 - 99 mg/dL    BUN 9 7.0 - 18 MG/DL    Creatinine 0.63 0.6 - 1.3 MG/DL    BUN/Creatinine ratio 14 12 - 20      GFR est AA >60 >60 ml/min/1.73m2    GFR est non-AA >60 >60 ml/min/1.73m2    Calcium 8.1 (L) 8.5 - 10.1 MG/DL    Bilirubin, total 0.8 0.2 - 1.0 MG/DL    ALT (SGPT) 33 13 - 56 U/L    AST (SGOT) 53 (H) 15 - 37 U/L    Alk. phosphatase 70 45 - 117 U/L    Protein, total 7.2 6.4 - 8.2 g/dL    Albumin 2.5 (L) 3.4 - 5.0 g/dL    Globulin 4.7 (H) 2.0 - 4.0 g/dL    A-G Ratio 0.5 (L) 0.8 - 1.7     GLUCOSE, POC    Collection Time: 12/26/18 11:38 AM   Result Value Ref Range    Glucose (POC) 156 (H) 70 - 110 mg/dL       Lab/Data Reviewed: All lab results for the last 24 hours reviewed. XRays were reviewed in past 24 hours    Medications Reviewed            Assessment/Plan     Active Problems:    Atherosclerotic PVD with intermittent claudication (HCC) (12/20/2018)      Hypokalemia (12/20/2018)      CAD (coronary artery disease) (12/20/2018)      DM (diabetes mellitus) ([facility] (12/20/2018)      HTN (hypertension) (12/20/2018)      Depression (12/20/2018)      Current smoker (12/20/2018)      Anemia (12/22/2018)        Hypernatremia   Rhabdomyolysis       CARE PLAN:     PVD with Claudication   - Continue Heparin drip   - pain control as needed   - s/p Left popliteal and tibial endarterectomy, patch angioplasty. left FEMORAL below knee to POPLITEAL BYPASS  - Patient on Levophed to keep SBP > 100 per vascular surgery  - Vascular Surgery on board      CAD  - Plavix  - Losartan   - aspirin   - Lipitor      DM  - SSI   - A1c     HTN  - hold  losartan , Norvasc  due to hypotension on pressor      Depression   - Wellbutrin , Seroquel, clonepin      Smoker   - advise cessation   - Nicotine Patch      Hypokalemia   - replace   - resolved     Hypernatremia   - resolved    Rhabdomyolysis   - resolved     Anemia   - microcytic   - received blood transfusion yesterday and Hgb now 8.7    Cough   - cxr 12/22 c/w right greater than left lower lobe airspace disease or atelectasis  - lasix 20 mg IV X 1 was given on that day before cxr results known  - albuterol as needed      DM Neuropathy  - Neurontin      DVT prophylaxis   Full code       Gilma Kim MD  December 26, 2018

## 2018-12-26 NOTE — PROGRESS NOTES
1930  Bedside SBAR report taken from Leonor Ramos Dr. Patient was just given a percocet for pain in her lower back. Whole blood was started prior to getting my report. Patient is awake, alert and oriented. Is on Levophed to keep blood pressure up. 2230  Blood has finished. No signs or symptoms of having a reaction. 0300  Labs drawn via red port of double lumen PICC    0510  Patient wanted to sit up. Order is for \"up ad rony\" therefore, two staff helped her to get from bed to chair. She sat on the edge of the bed for about 2 minutes before transferring. She did not want anything for pain prior. She was able to stand with two assist and slightly pivot herself to the chair. She was a little SOB, but her oxygen was off and she maintained at 100% on room air. Once settled in the chair, I educated her on what her lungs should be sounding like and what that actually sound like which is that there are crackles that can be heard in left lung fields. Explained the incentive spirometer and its purpose and how it exercises her lungs. She is reaching 1000. Told her to perform this 4-6 times each hour, but thus far she has done it about 10. She does have a productive congested cough. Trying yo wean her Levophed down, explained its purpose and why we want to get it weaned off.     0530  weaned off Levophed,    0608  Restarted levophed due to low BP while sitting up chair. 0730  Bedside SBAR report given to incoming RN Best. Levophed verified, skin and vascular checked together.

## 2018-12-26 NOTE — PROGRESS NOTES
Chart reviewed. Plan remains home health VS SNF depending on PT/OT evals & progress. Will cont to follow. Traci Gutierres RN,ext [phone].

## 2018-12-26 NOTE — PROGRESS NOTES
[facility]  [address], [address] [facility]   Dr. Brianda Ponce, Dr. Krys Garrett, Dr. Ralph Story, & Dr. Joe Owens  [phone] FAX# [phone]    Progress Note    Patient: Nikolas Barth MRN: [id number]  SSN: [id number]    YOB: 1965  Age: 48 y.o. Sex: female      Admit Date: 12/20/2018    LOS: 6 days     Subjective:     POD 5 from left Popliteal/tibial endarterectomy with patch angioplasty and Fem pop bypass. Pain controlled. Foot warm. Still on pressors but Bp improved. Objective:     Vitals:    12/26/18 0400 12/26/18 0500 12/26/18 0600 12/26/18 0700   BP: 133/64 134/72 94/42 129/74   Pulse: 79 (!) 109 97 93   Resp: 25 25 28 26   Temp: 99.3 °F (37.4 °C)      SpO2: 100%      Weight:       Height:            Intake and Output:  Current Shift: 12/26 0701 - 12/26 1900  In: -   Out: 300 [Urine:300]  Last three shifts: 12/24 1901 - 12/26 0700  In: 4118.1 [P.O.:480; I.V.:3091.8]  Out: 0027 [Urine:2760; Drains:74]    Physical Exam:   GENERAL: alert, cooperative, no distress, appears stated age  EYE: conjunctivae/corneas clear. PERRL, EOM's intact. Fundi benign  LYMPHATIC: Cervical, supraclavicular, and axillary nodes normal.   THROAT & NECK: normal and no erythema or exudates noted. LUNG: clear to auscultation bilaterally  HEART: regular rate and rhythm, S1, S2 normal, no murmur, click, rub or gallop  ABDOMEN: soft, non-tender. Bowel sounds normal. No masses,  no organomegaly  EXTREMITIES:  RLE c/d/i wound. LLE palpable DP, doppler PT. Doppler graft pulse. Decrease sensation at toes. Motor intact. NEUROLOGIC: AOx3. Gait normal. Reflexes and motor strength normal and symmetric. Cranial nerves 2-12 and sensation grossly intact.   PSYCHIATRIC: non focal    Lab/Data Review:  BMP:   Lab Results   Component Value Date/Time     12/26/2018 09:05 AM    K 4.0 12/26/2018 09:05 AM     12/26/2018 09:05 AM    CO2 26 12/26/2018 09:05 AM    AGAP 8 12/26/2018 09:05 AM     (H) 12/26/2018 09:05 AM    BUN 9 12/26/2018 09:05 AM    CREA 0.63 12/26/2018 09:05 AM    GFRAA >60 12/26/2018 09:05 AM    GFRNA >60 12/26/2018 09:05 AM     CBC:   Lab Results   Component Value Date/Time    WBC 7.3 12/26/2018 03:15 AM    HGB 8.7 (L) 12/26/2018 03:15 AM    HCT 27.5 (L) 12/26/2018 03:15 AM     12/26/2018 03:15 AM            Assessment:     Active Problems:    Atherosclerotic PVD with intermittent claudication ([facility] (12/20/2018)      Hypokalemia (12/20/2018)      CAD (coronary artery disease) (12/20/2018)      DM (diabetes mellitus) ([facility] (12/20/2018)      HTN (hypertension) (12/20/2018)      Depression (12/20/2018)      Current smoker (12/20/2018)      Anemia (12/22/2018)        Plan:     OOB to chiar  Wean pressor as tolerated. Plavix/ASA. D/D STACI drains at bedside.    D/C jill    Signed By: Jennifer Hernandez MD     December 26, 2018

## 2018-12-26 NOTE — PROGRESS NOTES
[facility]  ICU Progress Note      Name: Yamel Carias   : 1965   MRN: [id number]   Date: 2018 1:06 PM     [x]I have reviewed the flowsheet and previous days notes. Events overnight reviewed and discussed with nursing staff. Vital signs and records reviewed. HPI    48 y.o. female with PMHx significant for HTN, DM, PVD and CAD who presented with L leg pain. She had recently underwent atherectomy and PTA of L leg. She was placed on heparin and had a L fem pop bypass on . Her post surgical course was been complicated by post op hypotension. Subjective:  18  No new events overnight. Remains on Levo  CCB D/C'd  OOB to chair  Lethargic but arouses to voice     [x]The patient is critically ill on      []Mechanical ventilation [x]Pressors   []BiPAP []                 ROS:A comprehensive review of systems was negative except for that written in the HPI.     Medication Review:  · Pressors - Levo  · Sedation - none  · Antibiotics - Rocephin  · Pain - Levaquin      · GI/ DVT - SQH  · Others (other gtts)    Safety Bundles: CAUTI/ Electrolyte Replacement Protocol    Vital Signs:    Visit Vitals  BP (!) 117/94   Pulse 97   Temp 100 °F (37.8 °C)   Resp 28   Ht 5' 5\" (1.651 m)   Wt 76.2 kg (167 lb 15.9 oz)   SpO2 100%   BMI 27.96 kg/m²       O2 Device: Room air   O2 Flow Rate (L/min): 3 l/min   Temp (24hrs), Av.4 °F (37.4 °C), Min:97.7 °F (36.5 °C), Max:100.1 °F (37.8 °C)       Intake/Output:   Last shift:       07 - 1900  In: -   Out: 300 [Urine:300]  Last 3 shifts: 1901 - 700  In: 4118.1 [P.O.:480; I.V.:3091.8]  Out: 3770 [Urine:2760; Drains:74]    Intake/Output Summary (Last 24 hours) at 2018 1306  Last data filed at 2018 1123  Gross per 24 hour   Intake 1728.1 ml   Output 1630 ml   Net 98.1 ml     Physical Exam:    General: Lethargic, arouses to voice, NAD  HEENT:  Anicteric sclerae; pink palpebral conjunctivae; mucosa moist  Resp:  Clear bilaterally to auscultation; Symmetrical chest expansion, no accessory muscle use; good airway entry; no rales/ wheezing/ rhonchi noted  CV:  S1, S2 present; NSR  GI:  Abdomen soft, non-tender; (+) active bowel sounds  Extremities:  +2 pulses on all extremities; no edema/ cyanosis/ clubbing noted; normal capillary refill; BLE surgical wounds clean dry and intact  Skin:  Warm; no rashes/ lesions noted  Neurologic:  Non-focal; lethargic; oriented x 3  Devices: PICC, Ramos; STACI drain x2      DATA:     Current Facility-Administered Medications   Medication Dose Route Frequency    levoFLOXacin (LEVAQUIN) tablet 750 mg  750 mg Oral Q24H    heparin (porcine) injection 5,000 Units  5,000 Units SubCUTAneous Q8H    NOREPINephrine (LEVOPHED) 16 mg in dextrose 5% 250 mL infusion  2-16 mcg/min IntraVENous TITRATE    bacitracin 500 unit/gram packet 1 Packet  1 Packet Topical PRN    sodium chloride (NS) flush 10-30 mL  10-30 mL InterCATHeter PRN    sodium chloride (NS) flush 10 mL  10 mL InterCATHeter Q24H    sodium chloride (NS) flush 10 mL  10 mL InterCATHeter PRN    sodium chloride (NS) flush 10-40 mL  10-40 mL InterCATHeter Q8H    sodium chloride (NS) flush 20 mL  20 mL InterCATHeter Q24H    alteplase (CATHFLO) 1 mg in sterile water (preservative free) 1 mL injection  1 mg InterCATHeter PRN    vasopressin (VASOSTRICT) 20 Units in 0.9% sodium chloride 100 mL infusion  0.04 Units/min IntraVENous CONTINUOUS    0.9% sodium chloride infusion 250 mL  250 mL IntraVENous PRN    0.9% sodium chloride infusion 250 mL  250 mL IntraVENous PRN    insulin lispro (HUMALOG) injection   SubCUTAneous AC&HS    0.9% sodium chloride infusion 250 mL  250 mL IntraVENous PRN    cefTRIAXone (ROCEPHIN) 1 g in 0.9% sodium chloride (MBP/ADV) 50 mL MBP  1 g IntraVENous Q24H    HYDROmorphone (DILAUDID) syringe 1 mg  1 mg IntraVENous Q2H PRN    acetaminophen (TYLENOL) SR tablet 650 mg  650 mg Oral Q6H PRN    influenza vaccine 2018-19 (6 mos+)(PF) (FLUARIX QUAD/FLULAVAL QUAD) injection 0.5 mL  0.5 mL IntraMUSCular PRIOR TO DISCHARGE    ondansetron (ZOFRAN) injection 4 mg  4 mg IntraVENous Q4H PRN    morphine injection 4 mg  4 mg IntraVENous Q4H PRN    naloxone (NARCAN) injection 0.4 mg  0.4 mg IntraVENous PRN    potassium chloride (K-DUR, KLOR-CON) SR tablet 40 mEq  40 mEq Oral DAILY    glucose chewable tablet 16 g  4 Tab Oral PRN    glucagon (GLUCAGEN) injection 1 mg  1 mg IntraMUSCular PRN    dextrose (D50) infusion 12.5-25 g  25-50 mL IntraVENous PRN    atorvastatin (LIPITOR) tablet 80 mg  80 mg Oral DAILY    clonazePAM (KlonoPIN) tablet 1 mg  1 mg Oral BID    clopidogrel (PLAVIX) tablet 75 mg  75 mg Oral DAILY    gabapentin (NEURONTIN) capsule 300 mg  300 mg Oral TID    oxyCODONE-acetaminophen (PERCOCET) 5-325 mg per tablet 1-2 Tab  1-2 Tab Oral Q6H PRN    QUEtiapine SR (SEROquel XR) tablet 300 mg  300 mg Oral QHS    albuterol (PROVENTIL VENTOLIN) nebulizer solution 2.5 mg  2.5 mg Nebulization Q4H PRN         Labs: Results:       Chemistry Recent Labs     12/26/18  0905 12/26/18 0315 12/25/18  0200 12/24/18  0510   *  --  118* 105*     --  138 139   K 4.0 4.2 4.5 4.0     --  104 103   CO2 26  --  28 30   BUN 9  --  10 7   CREA 0.63  --  0.77 0.51*   CA 8.1*  --  7.9* 7.7*   AGAP 8  --  6 6   BUCR 14  --  13 14   AP 70  --  61  --    TP 7.2  --  6.4  --    ALB 2.5*  --  2.2*  --    GLOB 4.7*  --  4.2*  --    AGRAT 0.5*  --  0.5*  --       CBC w/Diff Recent Labs     12/26/18  0315 12/25/18  1450 12/25/18  0200  12/24/18  0510   WBC 7.3 4.7 5.6  --  5.3   RBC 3.82* 3.28* 3.86*  --  3.58*   HGB 8.7* 7.1* 8.4*   < > 7.9*   HCT 27.5* 23.4* 27.5*   < > 25.4*    283 340  --  242   GRANS 69  --  67  --  66   LYMPH 19*  --  18*  --  19*   EOS 2  --  2  --  3    < > = values in this interval not displayed.       Coagulation Recent Labs     12/24/18  1225 12/24/18  0510   APTT 80.9* 87.3* Liver Enzymes Recent Labs     12/26/18  0905   TP 7.2   ALB 2.5*   AP 70   SGOT 53*      ABG No results found for: PH, PHI, PCO2, PCO2I, PO2, PO2I, HCO3, HCO3I, FIO2, FIO2I   Microbiology Recent Labs     12/23/18 2123 12/23/18 2120   CULT NO GROWTH 3 DAYS NO GROWTH 3 DAYS          Telemetry: [x]Sinus []A-flutter []Paced    []A-fib []Multiple PVCs                    Imaging:  CXR Results  (Last 48 hours)    None          CT Results  (Last 48 hours)    None                IMPRESSION:   · PVD s/p L fem pop bypass  · Hypotension- remains on Levo; CCB may have contributed-D/C'd  · Anemia  Hx  · CAD  · DM  · Depression        PLAN:   · Resp - Stable on room air. O2 goal > 90. HOB > 30. Pulmonary hygiene. PRN albuterol. · ID - Afebrile and aleukocytosis. Trend temp and WBC curve. BCx NGTD. Continue ABx. Monitor for signs of infection. · CVS - BP requiring Levo. SBP goal > 100 per vascular surgery. D/C CCB. Plavix, lipitor. Continue to monitor HD status. · Heme/onc -  Daily CBC. Monitor H/H and platelets. Monitor for signs of bleeding. · Metabolic - Daily CMP. Trend and replace electrolytes per replacement protocol. · Renal - Trend renal indices. Ramos for strict I/O.  · Endocrine - SSI, BS ACHS. Avoid hypoglycemia. · Neuro/ Pain/ Sedation - Monitor neuro and neurovascular status. D/C Klonopin and gabapentin. · GI - Diabetic diet. Bowel regimen.   · Prophylaxis - DVT, GI- SQH  · Discussed in interdisciplinary rounds  · Code status- FULL          The patient is: [] acutely ill Risk of deterioration: [x] moderate    [x] critically ill  [] high     [x]See my orders for details    My assessment/plan was discussed with:  [x]nursing []PT/OT    []respiratory therapy [x]Dr. Nidhi Carreon   []family []       Afshin Miller NP

## 2018-12-26 NOTE — PROGRESS NOTES
conducted a Follow up consultation and Spiritual Assessment for Nikolas Barth, who is a 48 y.o.,female. The  provided the following Interventions:  Continued the relationship of care and support. Listened empathically. Offered prayer and assurance of continued prayer on patients behalf. Chart reviewed. The following outcomes were achieved:  Patient expressed gratitude for pastoral care visit. Assessment:  There are no further spiritual or Anabaptist issues which require Spiritual Care Services interventions at this time. Plan:  Chaplains will continue to follow and will provide pastoral care on an as needed/requested basis.  recommends bedside caregivers page  on duty if patient shows signs of acute spiritual or emotional distress.      [address] [facility]   Staff [address] [facility]   [phone]

## 2018-12-26 NOTE — CDMP QUERY
After further study, do you concur with the findings of Intensivist noted in the 12/25  Consultation note?    => Hypotension - likely hemorrhagic and/or hypovolemic post op    The medical record reflects the following:    Risk: 47 yo female w/PMH peripheral vascular disease,s/p fem-pop bypass     Clinical Indicators: per Intensivist note 12/25 post surgical course was been complicated by post op hypotension. -EBL on op report  750 ml    Treatment:  NS bolus, vasopressors (levophed, dopamine, ICU care, strictI&0    Please clarify if this patient is being treated/managed for:    =>post op hemorrhagic shock  =>post op hypovolemic shock  =>both post hemorrhagic and hypovolemic shock  =>Other Explanation of clinical findings  =>Unable to Determine (no explanation of clinical findings)    Please clarify and document your clinical opinion in the progress notes and discharge summary including the definitive and/or presumptive diagnosis, (suspected or probable), related to the above clinical findings. Please include clinical findings supporting your diagnosis. If you DECLINE this query or would like to communicate with [facility], please utilize the \"[facility] message box\" at the TOP of the Progress Note on the right.       Thank you,  Hiwot Buckner RN [facility]   [phone]

## 2018-12-27 LAB
ALBUMIN SERPL-MCNC: 2.3 G/DL (ref 3.4–5)
ALBUMIN/GLOB SERPL: 0.5 {RATIO} (ref 0.8–1.7)
ALP SERPL-CCNC: 71 U/L (ref 45–117)
ALT SERPL-CCNC: 41 U/L (ref 13–56)
ANION GAP SERPL CALC-SCNC: 9 MMOL/L (ref 3–18)
AST SERPL-CCNC: 69 U/L (ref 15–37)
BASOPHILS # BLD: 0 K/UL (ref 0–0.1)
BASOPHILS NFR BLD: 0 % (ref 0–2)
BILIRUB SERPL-MCNC: 0.4 MG/DL (ref 0.2–1)
BUN SERPL-MCNC: 11 MG/DL (ref 7–18)
BUN/CREAT SERPL: 19 (ref 12–20)
CALCIUM SERPL-MCNC: 7.9 MG/DL (ref 8.5–10.1)
CHLORIDE SERPL-SCNC: 103 MMOL/L (ref 100–108)
CO2 SERPL-SCNC: 25 MMOL/L (ref 21–32)
CREAT SERPL-MCNC: 0.58 MG/DL (ref 0.6–1.3)
DIFFERENTIAL METHOD BLD: ABNORMAL
EOSINOPHIL # BLD: 0.1 K/UL (ref 0–0.4)
EOSINOPHIL NFR BLD: 1 % (ref 0–5)
ERYTHROCYTE [DISTWIDTH] IN BLOOD BY AUTOMATED COUNT: 17.3 % (ref 11.6–14.5)
GLOBULIN SER CALC-MCNC: 4.3 G/DL (ref 2–4)
GLUCOSE BLD STRIP.AUTO-MCNC: 115 MG/DL (ref 70–110)
GLUCOSE BLD STRIP.AUTO-MCNC: 121 MG/DL (ref 70–110)
GLUCOSE BLD STRIP.AUTO-MCNC: 122 MG/DL (ref 70–110)
GLUCOSE BLD STRIP.AUTO-MCNC: 98 MG/DL (ref 70–110)
GLUCOSE SERPL-MCNC: 128 MG/DL (ref 74–99)
HCT VFR BLD AUTO: 26.9 % (ref 35–45)
HGB BLD-MCNC: 8.4 G/DL (ref 12–16)
LYMPHOCYTES # BLD: 1.8 K/UL (ref 0.9–3.6)
LYMPHOCYTES NFR BLD: 19 % (ref 21–52)
MCH RBC QN AUTO: 22.2 PG (ref 24–34)
MCHC RBC AUTO-ENTMCNC: 31.2 G/DL (ref 31–37)
MCV RBC AUTO: 71.2 FL (ref 74–97)
MONOCYTES # BLD: 0.8 K/UL (ref 0.05–1.2)
MONOCYTES NFR BLD: 9 % (ref 3–10)
NEUTS SEG # BLD: 6.7 K/UL (ref 1.8–8)
NEUTS SEG NFR BLD: 71 % (ref 40–73)
PLATELET # BLD AUTO: 346 K/UL (ref 135–420)
PMV BLD AUTO: 11.4 FL (ref 9.2–11.8)
POTASSIUM SERPL-SCNC: 4.1 MMOL/L (ref 3.5–5.5)
PROT SERPL-MCNC: 6.6 G/DL (ref 6.4–8.2)
RBC # BLD AUTO: 3.78 M/UL (ref 4.2–5.3)
SODIUM SERPL-SCNC: 137 MMOL/L (ref 136–145)
WBC # BLD AUTO: 9.4 K/UL (ref 4.6–13.2)

## 2018-12-27 PROCEDURE — 74011250636 HC RX REV CODE- 250/636: Performed by: INTERNAL MEDICINE

## 2018-12-27 PROCEDURE — 82962 GLUCOSE BLOOD TEST: CPT

## 2018-12-27 PROCEDURE — 74011250637 HC RX REV CODE- 250/637: Performed by: NURSE PRACTITIONER

## 2018-12-27 PROCEDURE — 74011000250 HC RX REV CODE- 250: Performed by: INTERNAL MEDICINE

## 2018-12-27 PROCEDURE — 80053 COMPREHEN METABOLIC PANEL: CPT

## 2018-12-27 PROCEDURE — 74011250636 HC RX REV CODE- 250/636: Performed by: FAMILY MEDICINE

## 2018-12-27 PROCEDURE — 74011250636 HC RX REV CODE- 250/636: Performed by: NURSE PRACTITIONER

## 2018-12-27 PROCEDURE — 74011250637 HC RX REV CODE- 250/637: Performed by: FAMILY MEDICINE

## 2018-12-27 PROCEDURE — 85025 COMPLETE CBC W/AUTO DIFF WBC: CPT

## 2018-12-27 PROCEDURE — 74011000250 HC RX REV CODE- 250: Performed by: NURSE PRACTITIONER

## 2018-12-27 PROCEDURE — 36592 COLLECT BLOOD FROM PICC: CPT

## 2018-12-27 PROCEDURE — 97530 THERAPEUTIC ACTIVITIES: CPT

## 2018-12-27 PROCEDURE — 97162 PT EVAL MOD COMPLEX 30 MIN: CPT

## 2018-12-27 PROCEDURE — 74011000258 HC RX REV CODE- 258: Performed by: NURSE PRACTITIONER

## 2018-12-27 PROCEDURE — 74011250637 HC RX REV CODE- 250/637: Performed by: INTERNAL MEDICINE

## 2018-12-27 PROCEDURE — 65610000006 HC RM INTENSIVE CARE

## 2018-12-27 PROCEDURE — 74011000258 HC RX REV CODE- 258: Performed by: FAMILY MEDICINE

## 2018-12-27 PROCEDURE — 74011000258 HC RX REV CODE- 258: Performed by: INTERNAL MEDICINE

## 2018-12-27 RX ADMIN — Medication 10 ML: at 15:24

## 2018-12-27 RX ADMIN — Medication 10 ML: at 22:00

## 2018-12-27 RX ADMIN — CLOPIDOGREL BISULFATE 75 MG: 75 TABLET, FILM COATED ORAL at 09:39

## 2018-12-27 RX ADMIN — LEVOFLOXACIN 750 MG: 750 TABLET, FILM COATED ORAL at 17:25

## 2018-12-27 RX ADMIN — HEPARIN SODIUM 5000 UNITS: 5000 INJECTION INTRAVENOUS; SUBCUTANEOUS at 13:00

## 2018-12-27 RX ADMIN — QUETIAPINE FUMARATE 300 MG: 300 TABLET, EXTENDED RELEASE ORAL at 21:30

## 2018-12-27 RX ADMIN — DEXTROSE MONOHYDRATE 9 MCG/MIN: 5 INJECTION, SOLUTION INTRAVENOUS at 03:35

## 2018-12-27 RX ADMIN — DEXTROSE MONOHYDRATE 4 MCG/MIN: 5 INJECTION, SOLUTION INTRAVENOUS at 13:00

## 2018-12-27 RX ADMIN — STANDARDIZED SENNA CONCENTRATE AND DOCUSATE SODIUM 1 TABLET: 8.6; 5 TABLET, FILM COATED ORAL at 09:39

## 2018-12-27 RX ADMIN — CEFTRIAXONE 1 G: 1 INJECTION, POWDER, FOR SOLUTION INTRAMUSCULAR; INTRAVENOUS at 21:31

## 2018-12-27 RX ADMIN — ACETAMINOPHEN 650 MG: 650 TABLET, FILM COATED, EXTENDED RELEASE ORAL at 10:30

## 2018-12-27 RX ADMIN — SODIUM CHLORIDE, SODIUM LACTATE, POTASSIUM CHLORIDE, AND CALCIUM CHLORIDE 500 ML: 600; 310; 30; 20 INJECTION, SOLUTION INTRAVENOUS at 10:25

## 2018-12-27 RX ADMIN — ATORVASTATIN CALCIUM 80 MG: 40 TABLET, FILM COATED ORAL at 09:38

## 2018-12-27 RX ADMIN — HEPARIN SODIUM 5000 UNITS: 5000 INJECTION INTRAVENOUS; SUBCUTANEOUS at 05:51

## 2018-12-27 RX ADMIN — Medication 10 ML: at 05:52

## 2018-12-27 RX ADMIN — HEPARIN SODIUM 5000 UNITS: 5000 INJECTION INTRAVENOUS; SUBCUTANEOUS at 21:32

## 2018-12-27 RX ADMIN — POTASSIUM CHLORIDE 40 MEQ: 20 TABLET, EXTENDED RELEASE ORAL at 09:38

## 2018-12-27 RX ADMIN — Medication 20 ML: at 15:24

## 2018-12-27 NOTE — PROGRESS NOTES
[facility]  ICU Progress Note      Name: Tawana Elizalde   : 1965   MRN: [id number]   Date: 2018 1:06 PM     [x]I have reviewed the flowsheet and previous days notes. Events overnight reviewed and discussed with nursing staff. Vital signs and records reviewed. HPI    48 y.o. female with PMHx significant for HTN, DM, PVD and CAD who presented with L leg pain. She had recently underwent atherectomy and PTA of L leg. She was placed on heparin and had a L fem pop bypass on . Her post surgical course was been complicated by post op hypotension. Subjective:  18  No new events overnight. Remains on Levo  More awake today  Tmax 100.6    [x]The patient is critically ill on      []Mechanical ventilation [x]Pressors   []BiPAP []                 ROS:A comprehensive review of systems was negative except for that written in the HPI. Medication Review:  · Pressors - Levo  · Sedation - none  · Antibiotics - Rocephin  · Pain - Levaquin      · GI/ DVT - SQH  · Others (other gtts)    Safety Bundles: Electrolyte Replacement Protocol    Vital Signs:    Visit Vitals  BP 97/71   Pulse 88   Temp (!) 100.6 °F (38.1 °C) Comment: RN notified   Resp 21   Ht 5' 5\" (1.651 m)   Wt 76.2 kg (167 lb 15.9 oz)   SpO2 100%   BMI 27.96 kg/m²       O2 Device: Room air   O2 Flow Rate (L/min): 3 l/min   Temp (24hrs), Av.8 °F (37.7 °C), Min:98.9 °F (37.2 °C), Max:100.6 °F (38.1 °C)       Intake/Output:   Last shift:      701 - 1900  In: 8.4 [I.V.:8.4]  Out: -   Last 3 shifts: 1901 -  07  In: 896.1 [P.O.:240;  I.V.:419.8]  Out: 1160 [Urine:1105; Drains:55]    Intake/Output Summary (Last 24 hours) at 2018 0832  Last data filed at 2018 0800  Gross per 24 hour   Intake 524.44 ml   Output 330 ml   Net 194.44 ml     Physical Exam:    General: Awake, alert, NAD  HEENT:  Anicteric sclerae; pink palpebral conjunctivae; mucosa moist  Resp:  Clear bilaterally to auscultation; Symmetrical chest expansion, no accessory muscle use; good airway entry; no rales/ wheezing/ rhonchi noted  CV:  S1, S2 present; NSR  GI:  Abdomen soft, non-tender; (+) active bowel sounds  Extremities:  +2 pulses on all extremities; no edema/ cyanosis/ clubbing noted; normal capillary refill; BLE surgical wounds clean dry and intact  Skin:  Warm; no rashes/ lesions noted  Neurologic:  Non-focal; lethargic; oriented x 3  Devices: PICC      DATA:     Current Facility-Administered Medications   Medication Dose Route Frequency    levoFLOXacin (LEVAQUIN) tablet 750 mg  750 mg Oral Q24H    heparin (porcine) injection 5,000 Units  5,000 Units SubCUTAneous Q8H    senna-docusate (PERICOLACE) 8.6-50 mg per tablet 1 Tab  1 Tab Oral DAILY    NOREPINephrine (LEVOPHED) 16 mg in dextrose 5% 250 mL infusion  2-16 mcg/min IntraVENous TITRATE    bacitracin 500 unit/gram packet 1 Packet  1 Packet Topical PRN    sodium chloride (NS) flush 10-30 mL  10-30 mL InterCATHeter PRN    sodium chloride (NS) flush 10 mL  10 mL InterCATHeter Q24H    sodium chloride (NS) flush 10 mL  10 mL InterCATHeter PRN    sodium chloride (NS) flush 10-40 mL  10-40 mL InterCATHeter Q8H    sodium chloride (NS) flush 20 mL  20 mL InterCATHeter Q24H    alteplase (CATHFLO) 1 mg in sterile water (preservative free) 1 mL injection  1 mg InterCATHeter PRN    vasopressin (VASOSTRICT) 20 Units in 0.9% sodium chloride 100 mL infusion  0.04 Units/min IntraVENous CONTINUOUS    0.9% sodium chloride infusion 250 mL  250 mL IntraVENous PRN    0.9% sodium chloride infusion 250 mL  250 mL IntraVENous PRN    insulin lispro (HUMALOG) injection   SubCUTAneous AC&HS    0.9% sodium chloride infusion 250 mL  250 mL IntraVENous PRN    cefTRIAXone (ROCEPHIN) 1 g in 0.9% sodium chloride (MBP/ADV) 50 mL MBP  1 g IntraVENous Q24H    HYDROmorphone (DILAUDID) syringe 1 mg  1 mg IntraVENous Q2H PRN    acetaminophen (TYLENOL) SR tablet 650 mg  650 mg Oral Q6H PRN    influenza vaccine 2018-19 (6 mos+)(PF) (FLUARIX QUAD/FLULAVAL QUAD) injection 0.5 mL  0.5 mL IntraMUSCular PRIOR TO DISCHARGE    ondansetron (ZOFRAN) injection 4 mg  4 mg IntraVENous Q4H PRN    morphine injection 4 mg  4 mg IntraVENous Q4H PRN    naloxone (NARCAN) injection 0.4 mg  0.4 mg IntraVENous PRN    potassium chloride (K-DUR, KLOR-CON) SR tablet 40 mEq  40 mEq Oral DAILY    glucose chewable tablet 16 g  4 Tab Oral PRN    glucagon (GLUCAGEN) injection 1 mg  1 mg IntraMUSCular PRN    dextrose (D50) infusion 12.5-25 g  25-50 mL IntraVENous PRN    atorvastatin (LIPITOR) tablet 80 mg  80 mg Oral DAILY    clopidogrel (PLAVIX) tablet 75 mg  75 mg Oral DAILY    oxyCODONE-acetaminophen (PERCOCET) 5-325 mg per tablet 1-2 Tab  1-2 Tab Oral Q6H PRN    QUEtiapine SR (SEROquel XR) tablet 300 mg  300 mg Oral QHS    albuterol (PROVENTIL VENTOLIN) nebulizer solution 2.5 mg  2.5 mg Nebulization Q4H PRN         Labs: Results:       Chemistry Recent Labs     12/27/18  0549 12/26/18  0905 12/26/18  0315 12/25/18  0200   * 120*  --  118*    137  --  138   K 4.1 4.0 4.2 4.5    103  --  104   CO2 25 26  --  28   BUN 11 9  --  10   CREA 0.58* 0.63  --  0.77   CA 7.9* 8.1*  --  7.9*   AGAP 9 8  --  6   BUCR 19 14  --  13   AP 71 70  --  61   TP 6.6 7.2  --  6.4   ALB 2.3* 2.5*  --  2.2*   GLOB 4.3* 4.7*  --  4.2*   AGRAT 0.5* 0.5*  --  0.5*      CBC w/Diff Recent Labs     12/27/18  0549 12/26/18  0315 12/25/18  1450 12/25/18  0200   WBC 9.4 7.3 4.7 5.6   RBC 3.78* 3.82* 3.28* 3.86*   HGB 8.4* 8.7* 7.1* 8.4*   HCT 26.9* 27.5* 23.4* 27.5*    268 283 340   GRANS 71 69  --  67   LYMPH 19* 19*  --  18*   EOS 1 2  --  2      Coagulation Recent Labs     12/24/18  1225   APTT 80.9*       Liver Enzymes Recent Labs     12/27/18  0549   TP 6.6   ALB 2.3*   AP 71   SGOT 69*      ABG No results found for: PH, PHI, PCO2, PCO2I, PO2, PO2I, HCO3, HCO3I, FIO2, FIO2I   Microbiology No results for input(s): CULT in the last 72 hours. Telemetry: [x]Sinus []A-flutter []Paced    []A-fib []Multiple PVCs                    Imaging:  CXR Results  (Last 48 hours)    None          CT Results  (Last 48 hours)    None                IMPRESSION:   · PVD s/p L fem pop bypass  · Hypotension- remains on Levo  · Anemia  Hx  · CAD  · DM  · Depression        PLAN:   · Resp - Stable on room air. O2 goal > 90. HOB > 30. Pulmonary hygiene. PRN albuterol. · ID - Afebrile and aleukocytosis. Trend temp and WBC curve. BCx NGTD. Continue ABx for 7 day course. Monitor for signs of infection. · CVS - BP requiring Levo. Goal MAP > 60. Plavix, lipitor. Continue to monitor HD status. · Heme/onc -  Daily CBC. Monitor H/H and platelets. Monitor for signs of bleeding. · Metabolic - Daily CMP. Trend and replace electrolytes per replacement protocol. · Renal - Trend renal indices. Ramos for strict I/O.  · Endocrine - SSI, BS ACHS. Avoid hypoglycemia. · Neuro/ Pain/ Sedation - Monitor neuro and neurovascular status. D/C Klonopin and gabapentin. · GI - Diabetic diet. Bowel regimen.   · Prophylaxis - DVT, GI- SQH  · PT/OT when off Levo  · Discussed in interdisciplinary rounds  · Code status- FULL          The patient is: [] acutely ill Risk of deterioration: [x] moderate    [x] critically ill  [] high     [x]See my orders for details    My assessment/plan was discussed with:  [x]nursing []PT/OT    []respiratory therapy [x]Dr. Roseann Strong   []family []       Karine Thomason NP

## 2018-12-27 NOTE — INTERDISCIPLINARY ROUNDS
IDR rounds completed. Maintain MAP> 60mmHg, keep off levophed. Continue to monitor, possible transfer if able to remain off levophed.

## 2018-12-27 NOTE — PROGRESS NOTES
Progress Note      Patient: Brittney Jones               Sex: female          DOA: 12/20/2018       YOB: 1965      Age:  48 y.o.        LOS:  LOS: 7 days               Subjective / Interval Hx  I       Patient post Left popliteal and tibial endarterectomy, patch angioplasty. left FEMORAL below knee to POPLITEAL BYPASS yesterday PM. No fever. C/o pain in the middle toe . On Levophed per Vascular surgery . EKG was noted to have EKG abnormality. Patient denies CP.    12/23 : Episodes of hypotension noted , now resolved . On pressors Levophed. Continue to hold BP medications     12/24:Pt saying that she wants to go home  12/25:weaning off pressors;received blood transfusion  12/27:no complain    Objective:      Visit Vitals  /87   Pulse (!) 101   Temp 97.9 °F (36.6 °C)   Resp 23   Ht 5' 5\" (1.651 m)   Wt 76.2 kg (167 lb 15.9 oz)   SpO2 (!) 49%   BMI 27.96 kg/m²   P/E  NAD,sitting comfortably in the chair. Heent:perrla,at/nc,mouth moist.  Lungs ctab  Heart s1s2 nl,no m/g  Abdm:soft,not tender,bs present. Extr:s/p surgery,staples in place. Palpable pedis pulse rt,noted with doppler in the left. Neuro:aaox3. Intake and Output:  Current Shift:  12/27 0701 - 12/27 1900  In: 16.8 [I.V.:16.8]  Out: 250 [Urine:250]  Last three shifts:  12/25 1901 - 12/27 0700  In: 896.1 [P.O.:240;  I.V.:419.8]  Out: 1160 [Urine:1105; Drains:55]    Recent Results (from the past 48 hour(s))   GLUCOSE, POC    Collection Time: 12/25/18 12:05 PM   Result Value Ref Range    Glucose (POC) 150 (H) 70 - 110 mg/dL   CBC W/O DIFF    Collection Time: 12/25/18  2:50 PM   Result Value Ref Range    WBC 4.7 4.6 - 13.2 K/uL    RBC 3.28 (L) 4.20 - 5.30 M/uL    HGB 7.1 (L) 12.0 - 16.0 g/dL    HCT 23.4 (L) 35.0 - 45.0 %    MCV 71.3 (L) 74.0 - 97.0 FL    MCH 21.6 (L) 24.0 - 34.0 PG    MCHC 30.3 (L) 31.0 - 37.0 g/dL    RDW 16.6 (H) 11.6 - 14.5 %    PLATELET 911 809 - 868 K/uL    MPV 10.6 9.2 - 11.8 FL   LACTIC ACID    Collection Time: 12/25/18  2:50 PM   Result Value Ref Range    Lactic acid 0.4 0.4 - 2.0 MMOL/L   CORTISOL    Collection Time: 12/25/18  3:35 PM   Result Value Ref Range    Cortisol, random 9.6 3.09 - 22.40 ug/dL   TYPE + CROSSMATCH    Collection Time: 12/25/18  5:00 PM   Result Value Ref Range    Crossmatch Expiration 12/28/2018     ABO/Rh(D) B POSITIVE     Antibody screen NEG     Unit number [id number]     Blood component type RC LR     Unit division 00     Status of unit TRANSFUSED     Crossmatch result Compatible    GLUCOSE, POC    Collection Time: 12/25/18  5:27 PM   Result Value Ref Range    Glucose (POC) 130 (H) 70 - 110 mg/dL   GLUCOSE, POC    Collection Time: 12/25/18 10:56 PM   Result Value Ref Range    Glucose (POC) 126 (H) 70 - 110 mg/dL   CBC WITH AUTOMATED DIFF    Collection Time: 12/26/18  3:15 AM   Result Value Ref Range    WBC 7.3 4.6 - 13.2 K/uL    RBC 3.82 (L) 4.20 - 5.30 M/uL    HGB 8.7 (L) 12.0 - 16.0 g/dL    HCT 27.5 (L) 35.0 - 45.0 %    MCV 72.0 (L) 74.0 - 97.0 FL    MCH 22.8 (L) 24.0 - 34.0 PG    MCHC 31.6 31.0 - 37.0 g/dL    RDW 16.9 (H) 11.6 - 14.5 %    PLATELET 954 210 - 379 K/uL    MPV 11.0 9.2 - 11.8 FL    NEUTROPHILS 69 40 - 73 %    LYMPHOCYTES 19 (L) 21 - 52 %    MONOCYTES 10 3 - 10 %    EOSINOPHILS 2 0 - 5 %    BASOPHILS 0 0 - 2 %    ABS. NEUTROPHILS 5.0 1.8 - 8.0 K/UL    ABS. LYMPHOCYTES 1.4 0.9 - 3.6 K/UL    ABS. MONOCYTES 0.7 0.05 - 1.2 K/UL    ABS. EOSINOPHILS 0.1 0.0 - 0.4 K/UL    ABS.  BASOPHILS 0.0 0.0 - 0.1 K/UL    DF AUTOMATED     MAGNESIUM    Collection Time: 12/26/18  3:15 AM   Result Value Ref Range    Magnesium 2.0 1.6 - 2.6 mg/dL   PHOSPHORUS    Collection Time: 12/26/18  3:15 AM   Result Value Ref Range    Phosphorus 4.0 2.5 - 4.9 MG/DL   POTASSIUM    Collection Time: 12/26/18  3:15 AM   Result Value Ref Range    Potassium 4.2 3.5 - 5.5 mmol/L   HEMOGLOBIN A1C W/O EAG    Collection Time: 12/26/18  3:15 AM   Result Value Ref Range    Hemoglobin A1c 5.9 (H) 4.2 - 5.6 %   CALCIUM, IONIZED    Collection Time: 12/26/18  3:20 AM   Result Value Ref Range    Ionized Calcium 1.08 (L) 1.12 - 1.32 MMOL/L   GLUCOSE, POC    Collection Time: 12/26/18  8:35 AM   Result Value Ref Range    Glucose (POC) 121 (H) 70 - 611 mg/dL   METABOLIC PANEL, COMPREHENSIVE    Collection Time: 12/26/18  9:05 AM   Result Value Ref Range    Sodium 137 136 - 145 mmol/L    Potassium 4.0 3.5 - 5.5 mmol/L    Chloride 103 100 - 108 mmol/L    CO2 26 21 - 32 mmol/L    Anion gap 8 3.0 - 18 mmol/L    Glucose 120 (H) 74 - 99 mg/dL    BUN 9 7.0 - 18 MG/DL    Creatinine 0.63 0.6 - 1.3 MG/DL    BUN/Creatinine ratio 14 12 - 20      GFR est AA >60 >60 ml/min/1.73m2    GFR est non-AA >60 >60 ml/min/1.73m2    Calcium 8.1 (L) 8.5 - 10.1 MG/DL    Bilirubin, total 0.8 0.2 - 1.0 MG/DL    ALT (SGPT) 33 13 - 56 U/L    AST (SGOT) 53 (H) 15 - 37 U/L    Alk.  phosphatase 70 45 - 117 U/L    Protein, total 7.2 6.4 - 8.2 g/dL    Albumin 2.5 (L) 3.4 - 5.0 g/dL    Globulin 4.7 (H) 2.0 - 4.0 g/dL    A-G Ratio 0.5 (L) 0.8 - 1.7     GLUCOSE, POC    Collection Time: 12/26/18 11:38 AM   Result Value Ref Range    Glucose (POC) 156 (H) 70 - 110 mg/dL   GLUCOSE, POC    Collection Time: 12/26/18  4:48 PM   Result Value Ref Range    Glucose (POC) 154 (H) 70 - 110 mg/dL   CALCIUM, IONIZED    Collection Time: 12/26/18  5:50 PM   Result Value Ref Range    Ionized Calcium 1.07 (L) 1.12 - 1.32 MMOL/L   GLUCOSE, POC    Collection Time: 12/26/18  9:09 PM   Result Value Ref Range    Glucose (POC) 124 (H) 70 - 110 mg/dL   CBC WITH AUTOMATED DIFF    Collection Time: 12/27/18  5:49 AM   Result Value Ref Range    WBC 9.4 4.6 - 13.2 K/uL    RBC 3.78 (L) 4.20 - 5.30 M/uL    HGB 8.4 (L) 12.0 - 16.0 g/dL    HCT 26.9 (L) 35.0 - 45.0 %    MCV 71.2 (L) 74.0 - 97.0 FL    MCH 22.2 (L) 24.0 - 34.0 PG    MCHC 31.2 31.0 - 37.0 g/dL    RDW 17.3 (H) 11.6 - 14.5 %    PLATELET 801 090 - 903 K/uL    MPV 11.4 9.2 - 11.8 FL    NEUTROPHILS 71 40 - 73 %    LYMPHOCYTES 19 (L) 21 - 52 %    MONOCYTES 9 3 - 10 %    EOSINOPHILS 1 0 - 5 %    BASOPHILS 0 0 - 2 %    ABS. NEUTROPHILS 6.7 1.8 - 8.0 K/UL    ABS. LYMPHOCYTES 1.8 0.9 - 3.6 K/UL    ABS. MONOCYTES 0.8 0.05 - 1.2 K/UL    ABS. EOSINOPHILS 0.1 0.0 - 0.4 K/UL    ABS. BASOPHILS 0.0 0.0 - 0.1 K/UL    DF AUTOMATED     METABOLIC PANEL, COMPREHENSIVE    Collection Time: 12/27/18  5:49 AM   Result Value Ref Range    Sodium 137 136 - 145 mmol/L    Potassium 4.1 3.5 - 5.5 mmol/L    Chloride 103 100 - 108 mmol/L    CO2 25 21 - 32 mmol/L    Anion gap 9 3.0 - 18 mmol/L    Glucose 128 (H) 74 - 99 mg/dL    BUN 11 7.0 - 18 MG/DL    Creatinine 0.58 (L) 0.6 - 1.3 MG/DL    BUN/Creatinine ratio 19 12 - 20      GFR est AA >60 >60 ml/min/1.73m2    GFR est non-AA >60 >60 ml/min/1.73m2    Calcium 7.9 (L) 8.5 - 10.1 MG/DL    Bilirubin, total 0.4 0.2 - 1.0 MG/DL    ALT (SGPT) 41 13 - 56 U/L    AST (SGOT) 69 (H) 15 - 37 U/L    Alk. phosphatase 71 45 - 117 U/L    Protein, total 6.6 6.4 - 8.2 g/dL    Albumin 2.3 (L) 3.4 - 5.0 g/dL    Globulin 4.3 (H) 2.0 - 4.0 g/dL    A-G Ratio 0.5 (L) 0.8 - 1.7     GLUCOSE, POC    Collection Time: 12/27/18  8:25 AM   Result Value Ref Range    Glucose (POC) 122 (H) 70 - 110 mg/dL       Lab/Data Reviewed: All lab results for the last 24 hours reviewed. XRays were reviewed in past 24 hours    Medications Reviewed            Assessment/Plan     Active Problems:    Atherosclerotic PVD with intermittent claudication (HCC) (12/20/2018)      Hypokalemia (12/20/2018)      CAD (coronary artery disease) (12/20/2018)      DM (diabetes mellitus) ([facility] [address] (12/20/2018)      HTN (hypertension) (12/20/2018)      Depression (12/20/2018)      Current smoker (12/20/2018)      Anemia (12/22/2018)        Hypernatremia   Rhabdomyolysis       CARE PLAN:     PVD with Claudication   - Continue Heparin drip   - pain control as needed   - s/p Left popliteal and tibial endarterectomy, patch angioplasty.  left FEMORAL below knee to POPLITEAL BYPASS  - Patient on Levophed to keep SBP > 100 per vascular surgery  - Vascular Surgery on board   - On levaquin,ceftriaxone     CAD  - Plavix  - Losartan   - aspirin   - Lipitor      DM  - SSI   - A1c     HTN  - hold  losartan , Norvasc  due to hypotension on pressor      Depression   - Wellbutrin , Seroquel, clonepin      Smoker   - advise cessation   - Nicotine Patch      Hypokalemia   - replace   - resolved     Hypernatremia   - resolved    Rhabdomyolysis   - resolved     Anemia   - microcytic   - received blood transfusion yesterday and Hgb now 8.7    Cough   - cxr 12/22 c/w right greater than left lower lobe airspace disease or atelectasis  - lasix 20 mg IV X 1 was given on that day before cxr results known  - albuterol as needed      DM Neuropathy  - Neurontin      DVT prophylaxis   Full code       Rosemary Mcintosh MD  December 27, 2018

## 2018-12-27 NOTE — PROGRESS NOTES
Problem: Falls - Risk of  Goal: *Absence of Falls  Document Kiko Fall Risk and appropriate interventions in the flowsheet. Outcome: Progressing Towards Goal  Fall Risk Interventions:  Mobility Interventions: Bed/chair exit alarm         Medication Interventions: Bed/chair exit alarm    Elimination Interventions: Call light in reach             Problem: Patient Education: Go to Patient Education Activity  Goal: Patient/Family Education  Outcome: Progressing Towards Goal  Patient is alert and oriented. She is able to use her call bell. Problem: Pain  Goal: *Control of Pain  Outcome: Progressing Towards Goal  Patient denies pain at this time. Problem: Pressure Injury - Risk of  Goal: *Prevention of pressure injury  Document Carlo Scale and appropriate interventions in the flowsheet. Outcome: Progressing Towards Goal  Pressure Injury Interventions:  Sensory Interventions: Maintain/enhance activity level, Minimize linen layers, Monitor skin under medical devices    Moisture Interventions: Absorbent underpads    Activity Interventions: PT/OT evaluation    Mobility Interventions: HOB 30 degrees or less    Nutrition Interventions: Document food/fluid/supplement intake, Discuss nutritional consult with provider, Offer support with meals,snacks and hydration    Friction and Shear Interventions: Apply protective barrier, creams and emollients, Foam dressings/transparent film/skin sealants               Problem: Patient Education: Go to Patient Education Activity  Goal: Patient/Family Education  Outcome: Progressing Towards Goal  Patient educated on pain control measures. Comments: Patient educated on pain control measures. Uses IS every hour with encouragement.

## 2018-12-27 NOTE — PROGRESS NOTES
12/27/18  Chart reviewed and I spoke with patient. Therapy had been by twice and both HH and snf were recommended. I discussed snf with pt, and I gave her the snf list and put one on her chart. She will think about it tonight. Case Management to follow. Deepak Lancaster.  [address] Management  [phone], beeper [phone]

## 2018-12-27 NOTE — PROGRESS NOTES
Problem: Mobility Impaired (Adult and Pediatric)  Goal: *Acute Goals and Plan of Care (Insert Text)  Physical Therapy Goals   Initiated 12/27/2018 and to be accomplished within 7 days. 1.  Patient will complete all bed mobility with supervision/set-up in order to prepare for EOB/OOB activity. 2.  Patient will perform sit <> stand with minimal assistance/contact guard assist in order to prepare for OOB/gait activity. 3.  Patient will perform bed to chair transfers with minimal assistance/contact guard assist in order to promote mobility and encourage seated activity to progress towards their prior level of function. 4.  Patient will ambulate 15 feet with minimal assistance/contact guard assist using LRAD in order to prepare for safe negotiation of their environment. Outcome: Progressing Towards Goal    PHYSICAL THERAPY: Daily TREATMENT Note   INPATIENT: Medicare: Hospital Day: 8     Patient: Shakira Rodriguez (68 y.o. female)    Date: 12/27/2018  Primary Diagnosis: Atherosclerotic PVD with intermittent claudication (HCC)   Procedure(s) (LRB):  left FEMORAL below knee to POPLITEAL BYPASS (Right), 6 Days Post-Op,   Precautions: (None)  Chart, physical therapy assessment, plan of care and goals were reviewed. PLOF: Independent    ASSESSMENT:  Second session d/t RN Collette Bold request PT assist patient to return to bed. Seated in chair. Mod A for sit to stand. Unable extend hips fully to full upright posture. Mod A x2 for chair to bed transfer. Poor standing balance; decreased speed. Returned to supine in bed with min A. BP 90/72 post mobility. Seated in bed with HOB elevated; all needs in reach. Progression toward goals:        Improving appropriately and progressing toward goals     PLAN:  Patient continues to benefit from skilled intervention to address the above impairments. Continue treatment per established plan of care.     EDUCATION:   Education:  Patient was educated on the following topics: Bed mobility, transfers, ADLs, balance, amb, safety, exercise, role of PT, plan of care, cognition, skin integrity, vitals    Barriers to Learning/Limitations: None  Compensate with: visual, verbal, tactile, kinesthetic cues/model    Discharge Recommendations:  Barrera Shelton  Further Equipment Recommendations for Discharge:  rolling walker      SUBJECTIVE:   Patient stated Thank you.     OBJECTIVE DATA SUMMARY:   Critical Behavior:  Neurologic State: Drowsy  Orientation Level: Oriented X4  Cognition: Follows commands    G CODE:Mobility [id number] Current  CM= 80-99%   Goal  CJ= 20-39%. The severity rating is based on the [employer] Balance Scale 1/5    [employer] Balance Scale 1/5  0: Pt performs 25% or less of standing activity (Max assist) CN, 100% impaired. 1: Pt supports self with upper extremities but requires therapist assistance. Pt performs 25-50% of effort (Mod assist) CM, 80% to <100% impaired. 1+: Pt supports self with upper extremities but requires therapist assistance. Pt performs >50% effort. (Min assist). CL, 60% to <80% impaired. 2: Pt supports self independently with both upper extremities (walker, crutches, parallel bars). CL, 60% to <80% impaired. 2+: Pt support self independently with 1 upper extremity (cane, crutch, 1 parallel bar). CK, 40% to <60% impaired. 3: Pt stands without upper extremity support for up to 30 seconds. CK, 40% to <60% impaired. 3+: Pt stands without upper extremity support for 30 seconds or greater. CJ, 20% to <40% impaired. 4: Pt independently moves and returns center of gravity 1-2 inches in one plane. CJ, 20% to <40% impaired. 4+: Pt independently moves and returns center of gravity 1-2 inches in multiple planes. CI, 1% to <20% impaired. 5: Pt independently moves and returns center of gravity in all planes greater than 2 inches. CH, 0% impaired.       Functional Mobility:      Functional Status      Indep   (I)   Mod I Super-vision   Min A   Mod A   Max A   Total A   Assist x2 Verbal cues Additional time Not tested   Comments   Rolling []  []  [] []    []    []  []  [] [] [] [x]    Supine to sit []  []  [] []  []  []  []  [] [] [] [x]    Sit to supine []  []  [] [x]  []  []  []  [] [] [] []    Sit to stand []  []  [] []  [x]  []  []  [] [] [] [x]    Stand to sit []  []  [] []  [x]  []  []  [] [] [] [x]    Bed to chair transfers []  []  [] []  [x]  []  []  [] [] [] [x]        Balance    Good   Fair   Poor   Unable   Not tested   Comments   Sitting static [x]  []  []  []  []    Sitting dynamic [x]  []  []  []  []    Standing static []  []  [x]  []  []    Standing dynamic []  []  [x]  []  []        Pain:  Pre treatment pain level: 0  Post treatment pain level: 0  Pain Scale 1: Numeric (0 - 10)  Pain Intensity 1: 0  Activity Tolerance:   Fair     After treatment:   Patient left in no apparent distress in bed  Call bell left within reach  Nursing notified    Laxmi Leahy PT   Time Calculation: 10 mins

## 2018-12-27 NOTE — ROUTINE PROCESS
0700: Bedside shift change report given to Miller Randall RN (oncoming nurse) by Donald Webb RN (offgoing nurse). Report included the following information SBAR, Kardex, ED Summary, OR Summary, Procedure Summary, Intake/Output, MAR, Accordion, Recent Results and Med Rec Status. 1000: Dr. Riya Ambrosio at bedside, removed STACI drains. 1900: Bedside shift change report given to Humberto Flores RN (oncoming nurse) by Miller Randall RN (offgoing nurse). Report included the following information SBAR, Kardex, ED Summary, OR Summary, Procedure Summary, Intake/Output, MAR, Accordion, Recent Results and Med Rec Status.

## 2018-12-27 NOTE — DIABETES MGMT
NUTRITIONAL ASSESSMENT GLYCEMIC CONTROL/ PLAN OF CARE     Lissett Sierra           48 y.o.           12/20/2018                 1. Occlusion of femoropopliteal bypass graft, initial encounter ([address]    T2DM   INTERVENTIONS/PLAN:   1. On- going diabetes education. 2.  Monitor po intake, labs and weights. ASSESSMENT:   Nutritional Status:  Pt is 134% ideal wt; pt appears well nourished and po intake was adequate this morning. Po intake appears to be improving. Pt seemed drowsy this morning. Nutrition Diagnoses: Altered nutrition related labs due to diabetes as evidenced by use of Trajenta at home. Overweight due to excess energy intake as evidenced by BMI of 28.0 kg/m2. Diabetes Management:   Pt attended DM education class 5-10-17 (nutrition session). Pt with well controlled DM PTA and BG is currently well controlled. Recent blood glucose:     12/27/18:  122  Within target range (non-ICU: <140; ICU<180): [x] Yes   []  No    Current Insulin regimen:   Corrective lispro, normal insulin sensitivity ACHS                                              Home medication/insulin regimen:   Trajenta 5 mg/d  HbA1c:  5.9% - ave BG has been ~ 117 mg/dL over past 3 months. Adequate glycemic control PTA:  [x] Yes  [] No       SUBJECTIVE/OBJECTIVE:   Information obtained from: chart review, ICU rounds, pt  Pt reports her appetite is fair. Her weight was stable PTA. No known food allergies. Pt denies problems with chewing or swallowing. Diet: consistent CHO 3594-3980 calories - po intake at breakfast - 370 calories.       Patient Vitals for the past 100 hrs:   % Diet Eaten   12/25/18 1700 30 %   12/25/18 1200 0 %   12/25/18 0900 50 %   12/24/18 1300 65 %   12/24/18 0800 0 %   12/23/18 1130 50 %   12/23/18 0830 100 %       Medications: [x]                Reviewed   Pertinent:  Lipitor 80 mg/d    Most Recent POC Glucose:   Recent Labs     12/27/18  0549 12/26/18  0905 12/25/18  0200   * 120* 118* Labs:   Lab Results   Component Value Date/Time    Hemoglobin A1c 5.9 (H) 12/26/2018 03:15 AM     Lab Results   Component Value Date/Time    Sodium 137 12/27/2018 05:49 AM    Potassium 4.1 12/27/2018 05:49 AM    Chloride 103 12/27/2018 05:49 AM    CO2 25 12/27/2018 05:49 AM    Anion gap 9 12/27/2018 05:49 AM    Glucose 128 (H) 12/27/2018 05:49 AM    BUN 11 12/27/2018 05:49 AM    Creatinine 0.58 (L) 12/27/2018 05:49 AM    Calcium 7.9 (L) 12/27/2018 05:49 AM    Magnesium 2.0 12/26/2018 03:15 AM    Phosphorus 4.0 12/26/2018 03:15 AM    Albumin 2.3 (L) 12/27/2018 05:49 AM       Anthropometrics: IBW : 56.7 kg (125 lb), % IBW (Calculated): 134.39 %, BMI (calculated): 28  Wt Readings from Last 1 Encounters:   12/27/18 76.2 kg (167 lb 15.9 oz)      Ht Readings from Last 1 Encounters:   12/27/18 5' 5\" (1.651 m)       Estimated Nutrition Needs:  5436 Kcals/day, Protein (g): 68 g Fluid (ml): 1700 ml  Based on:   [x]          Actual BW    []          ABW   []            Adjusted BW         Nutrition Interventions:  None at this time. Goal:   Blood glucose will be within target range of  mg/dL by 1230/18. Weight maintenance (+/- 1-2 kg) by 1/11/19.         Nutrition Monitoring and Evaluation      [x]     Monitor po intake on meal rounds  [x]     Continue inpatient monitoring and intervention  []     Other:      Nutrition Risk:  []   High     []  Moderate    [x]  Minimal/Uncompromised    Galina Londono RD, CDE   Office:  [address] [place] [address] Pager:  [phone]

## 2018-12-27 NOTE — PROGRESS NOTES
Problem: Mobility Impaired (Adult and Pediatric)  Goal: *Acute Goals and Plan of Care (Insert Text)  Physical Therapy Goals   Initiated 12/27/2018 and to be accomplished within 7 days. 1.  Patient will complete all bed mobility with supervision/set-up in order to prepare for EOB/OOB activity. 2.  Patient will perform sit <> stand with minimal assistance/contact guard assist in order to prepare for OOB/gait activity. 3.  Patient will perform bed to chair transfers with minimal assistance/contact guard assist in order to promote mobility and encourage seated activity to progress towards their prior level of function. 4.  Patient will ambulate 15 feet with minimal assistance/contact guard assist using LRAD in order to prepare for safe negotiation of their environment. Outcome: Progressing Towards Goal  PHYSICAL THERAPY: Initial Assessment   INPATIENT: Medicare: Hospital Day: 8     Patient: Nikolas Barth (2 y.o. female)    Date: 12/27/2018  Primary Diagnosis: Atherosclerotic PVD with intermittent claudication (HCC)   Procedure(s) (LRB):  left FEMORAL below knee to POPLITEAL BYPASS (Right), 6 Days Post-Op   Precautions: (None)    PLOF: Independent with mobility PTA    ASSESSMENT :  Patient's BP in supine 85/62. D/w MARTIN Munoz that BP is below mobility standards per protocol. Per MARTIN Munoz and Dr. Radha Garzon would like the patient to be mobilized; patient is being treated with pressors. Patient supine in bed, agreeable to participation with PT. Patient reports that she lives with her 2 grandsons in a 2nd floor apartment with [address]. Denies use of AD PTA but reports that she does have a walker. MOD A x 1 for supine > sit with increased time to complete. Good seated balance. MOD A x 1 for sit <> stand with RW for support. Fair standing balance; verbal cuing for upright posture in stance. Patient ambulates 4 feet with RW and CGA; ambulates very slowly with verbal cuing for strategy with RW.  Ambulates with short strides and demo's difficulty advancing L LE. CGA for stand > sit to chair. Patient left sitting in chair with all needs within reach. Patient has no c/o dizziness/lightheadedness with mobility. BP at end of session 100/81, SPO2 100%. Recommend d/c to SNF. Patient presents with deficits in:  Bed Mobility, Transfers, Gait and Strength    Patient will benefit from skilled intervention to address the above impairments. Patients rehabilitation potential is considered to be Fair  Factors which may influence rehabilitation potential include:   []         None noted  []         Mental ability/status  [x]         Medical condition  []         Home/family situation and support systems  []         Safety awareness  [x]         Pain tolerance/management  []         Other:      PLAN :  Recommendations and Planned Interventions:  [x]           Bed Mobility Training             [x]    Neuromuscular Re-Education  [x]           Transfer Training                   []    Orthotic/Prosthetic Training  [x]           Gait Training                          []    Modalities  [x]           Therapeutic Exercises          []    Edema Management/Control  [x]           Therapeutic Activities            [x]    Patient and Family Training/Education  []           Other (comment):      EDUCATION:   Education:  Patient was educated on the following topics: Purpose of PT, PT POC, bed mobility, transfers, ambulation, safety with mobility. Barriers to Learning/Limitations: None  Compensate with: visual, verbal, tactile, kinesthetic cues/model    Recommendations for the next treatment session: Gait   Frequency/Duration: Patient will be followed by physical therapy 3 - 5 times a week to address goals. Discharge Recommendations: Home Health  Further Equipment Recommendations for Discharge: rolling walker  Factors which may impact discharge planning: N/A     SUBJECTIVE:   Patient stated I'm okay.     OBJECTIVE DATA SUMMARY:     Past Medical History:   Diagnosis Date    Arthritis     Asthma     Depression     Hypertension     Unspecified sleep apnea      Past Surgical History:   Procedure Laterality Date    HX  SECTION      HX HYSTERECTOMY      HX ORTHOPAEDIC      \"plate in R arm\"    HX SHOULDER ARTHROSCOPY Left          Eval Complexity: History: MEDIUM  Complexity : 1-2 comorbidities / personal factors will impact the outcome/ POC Exam:MEDIUM Complexity : 3 Standardized tests and measures addressing body structure, function, activity limitation and / or participation in recreation  Presentation: MEDIUM Complexity : Evolving with changing characteristics  Clinical Decision Making:Medium Complexity MOD A - CGA for mobility Overall Complexity:MEDIUM    G CODES:Mobility  Current  CK= 40-59%   Goal  CJ= 20-39%. The severity rating is based on the Other MOD A - CGA for bed mobility, transfers, ambulation, gait abnormality    Prior Level of Function/Home Situation:   Home Situation  Home Environment: Apartment  # Steps to Enter: 12  Rails to Enter: Yes  One/Two Story Residence: One story  Living Alone: No  Support Systems: Family member(s)  Patient Expects to be Discharged to[de-identified] Private residence  Current DME Used/Available at Home: None  Critical Behavior:  Neurologic State: Alert  Orientation Level: Oriented X4  Cognition: Follows commands     Psychosocial  Patient Behaviors: Calm; Cooperative  Purposeful Interaction: Yes    Manual Muscle Testing (LE)  Deferred     Tone : Normal  Sensation: NT  Range Of Motion: WFL    Functional Mobility:      Functional Status      Indep   (I)   Mod I   Super-vision   Min A   Mod A   Max A   Total A   Assist x2 Verbal cues Additional time Not tested   Comments   Rolling []  []  [] []    []    []  []  [] [] [] []    Supine to sit []  []  [] []  [x]  []  []  [] [] [] []    Sit to supine []  []  [] []  []  []  []  [] [] [] []    Sit to stand []  []  [] []  []  []  []  [] [] [] []    Stand to sit [] []  [] []  []  []  []  [] [] [] []    Bed to chair transfers []  []  [] []  []  []  []  [] [] [] []        Balance    Good   Fair   Poor   Unable   Not tested   Comments   Sitting static [x]  []  []  []  []    Sitting dynamic [x]  []  []  []  []    Standing static []  [x]  []  []  []    Standing dynamic []  [x]  []  []  []        Mobility/Gait:   Level of Assistance: Contact guard assistance  Assistive Device: rolling walker  Distance Ambulated: 4 feet     Left Lower Extremity: FWB  Right Lower Extremity: FWB  Base of Support: center of gravity altered  Speed/Kym: slow  Step Length: left shortened and right shortened  Swing Pattern: WFL  Stance: WFL  Gait Abnormalities: altered arm swing and decreased step clearance    Pain:  None    Vital Signs  Temp: 98.6 °F (37 °C)     Pulse (Heart Rate): (!) 105     BP: (!) 83/53     Resp Rate: 27     O2 Sat (%): 99 %    Activity Tolerance:   Fair    Please refer to the flowsheet for vital signs taken during this treatment. After treatment:   []         Patient left in no apparent distress sitting up in chair  [x]         Patient left in no apparent distress in bed  [x]         Call bell left within reach  []         Nursing notified  []         Caregiver present  []         Bed alarm activated    COMMUNICATION/EDUCATION:   [x]         Fall prevention education was provided and the patient/caregiver indicated understanding. [x]         Patient/family have participated as able in goal setting and plan of care. [x]         Patient/family agree to work toward stated goals and plan of care. []         Patient understands intent and goals of therapy, but is neutral about his/her participation. []         Patient is unable to participate in goal setting and plan of care.       Thank you for this referral.  Yoan Arguello   Time Calculation: 19 mins

## 2018-12-28 ENCOUNTER — APPOINTMENT (OUTPATIENT)
Dept: GENERAL RADIOLOGY | Age: 53
DRG: 253 | End: 2018-12-28
Attending: HOSPITALIST
Payer: MEDICARE

## 2018-12-28 LAB
ALBUMIN SERPL-MCNC: 2.2 G/DL (ref 3.4–5)
ALBUMIN/GLOB SERPL: 0.5 {RATIO} (ref 0.8–1.7)
ALP SERPL-CCNC: 78 U/L (ref 45–117)
ALT SERPL-CCNC: 51 U/L (ref 13–56)
ANION GAP SERPL CALC-SCNC: 6 MMOL/L (ref 3–18)
AST SERPL-CCNC: 80 U/L (ref 15–37)
BASOPHILS # BLD: 0 K/UL (ref 0–0.1)
BASOPHILS NFR BLD: 0 % (ref 0–2)
BILIRUB SERPL-MCNC: 0.3 MG/DL (ref 0.2–1)
BUN SERPL-MCNC: 13 MG/DL (ref 7–18)
BUN/CREAT SERPL: 26 (ref 12–20)
CALCIUM SERPL-MCNC: 7.9 MG/DL (ref 8.5–10.1)
CHLORIDE SERPL-SCNC: 106 MMOL/L (ref 100–108)
CO2 SERPL-SCNC: 26 MMOL/L (ref 21–32)
CREAT SERPL-MCNC: 0.5 MG/DL (ref 0.6–1.3)
DIFFERENTIAL METHOD BLD: ABNORMAL
EOSINOPHIL # BLD: 0.3 K/UL (ref 0–0.4)
EOSINOPHIL NFR BLD: 4 % (ref 0–5)
ERYTHROCYTE [DISTWIDTH] IN BLOOD BY AUTOMATED COUNT: 18 % (ref 11.6–14.5)
GLOBULIN SER CALC-MCNC: 4.2 G/DL (ref 2–4)
GLUCOSE BLD STRIP.AUTO-MCNC: 109 MG/DL (ref 70–110)
GLUCOSE BLD STRIP.AUTO-MCNC: 112 MG/DL (ref 70–110)
GLUCOSE BLD STRIP.AUTO-MCNC: 118 MG/DL (ref 70–110)
GLUCOSE BLD STRIP.AUTO-MCNC: 82 MG/DL (ref 70–110)
GLUCOSE BLD STRIP.AUTO-MCNC: 83 MG/DL (ref 70–110)
GLUCOSE BLD STRIP.AUTO-MCNC: 91 MG/DL (ref 70–110)
GLUCOSE SERPL-MCNC: 86 MG/DL (ref 74–99)
HCT VFR BLD AUTO: 25.3 % (ref 35–45)
HGB BLD-MCNC: 7.6 G/DL (ref 12–16)
LYMPHOCYTES # BLD: 1.3 K/UL (ref 0.9–3.6)
LYMPHOCYTES NFR BLD: 18 % (ref 21–52)
MCH RBC QN AUTO: 21.8 PG (ref 24–34)
MCHC RBC AUTO-ENTMCNC: 30 G/DL (ref 31–37)
MCV RBC AUTO: 72.5 FL (ref 74–97)
MONOCYTES # BLD: 0.6 K/UL (ref 0.05–1.2)
MONOCYTES NFR BLD: 9 % (ref 3–10)
NEUTS SEG # BLD: 4.7 K/UL (ref 1.8–8)
NEUTS SEG NFR BLD: 69 % (ref 40–73)
PLATELET # BLD AUTO: 295 K/UL (ref 135–420)
PMV BLD AUTO: 11.3 FL (ref 9.2–11.8)
POTASSIUM SERPL-SCNC: 4.5 MMOL/L (ref 3.5–5.5)
PROT SERPL-MCNC: 6.4 G/DL (ref 6.4–8.2)
RBC # BLD AUTO: 3.49 M/UL (ref 4.2–5.3)
SODIUM SERPL-SCNC: 138 MMOL/L (ref 136–145)
WBC # BLD AUTO: 6.8 K/UL (ref 4.6–13.2)

## 2018-12-28 PROCEDURE — 74011000258 HC RX REV CODE- 258: Performed by: INTERNAL MEDICINE

## 2018-12-28 PROCEDURE — 85025 COMPLETE CBC W/AUTO DIFF WBC: CPT

## 2018-12-28 PROCEDURE — 74011250637 HC RX REV CODE- 250/637: Performed by: NURSE PRACTITIONER

## 2018-12-28 PROCEDURE — 80053 COMPREHEN METABOLIC PANEL: CPT

## 2018-12-28 PROCEDURE — 71045 X-RAY EXAM CHEST 1 VIEW: CPT

## 2018-12-28 PROCEDURE — 74011250636 HC RX REV CODE- 250/636: Performed by: INTERNAL MEDICINE

## 2018-12-28 PROCEDURE — 97530 THERAPEUTIC ACTIVITIES: CPT

## 2018-12-28 PROCEDURE — 36592 COLLECT BLOOD FROM PICC: CPT

## 2018-12-28 PROCEDURE — 65660000000 HC RM CCU STEPDOWN

## 2018-12-28 PROCEDURE — 74011250637 HC RX REV CODE- 250/637: Performed by: FAMILY MEDICINE

## 2018-12-28 PROCEDURE — 74011000250 HC RX REV CODE- 250: Performed by: HOSPITALIST

## 2018-12-28 PROCEDURE — 82962 GLUCOSE BLOOD TEST: CPT

## 2018-12-28 PROCEDURE — 74011250636 HC RX REV CODE- 250/636: Performed by: NURSE PRACTITIONER

## 2018-12-28 RX ORDER — IPRATROPIUM BROMIDE AND ALBUTEROL SULFATE 2.5; .5 MG/3ML; MG/3ML
3 SOLUTION RESPIRATORY (INHALATION)
Status: COMPLETED | OUTPATIENT
Start: 2018-12-28 | End: 2018-12-28

## 2018-12-28 RX ADMIN — CLOPIDOGREL BISULFATE 75 MG: 75 TABLET, FILM COATED ORAL at 09:15

## 2018-12-28 RX ADMIN — QUETIAPINE FUMARATE 300 MG: 300 TABLET, EXTENDED RELEASE ORAL at 21:38

## 2018-12-28 RX ADMIN — STANDARDIZED SENNA CONCENTRATE AND DOCUSATE SODIUM 1 TABLET: 8.6; 5 TABLET, FILM COATED ORAL at 09:15

## 2018-12-28 RX ADMIN — OXYCODONE AND ACETAMINOPHEN 1 TABLET: 5; 325 TABLET ORAL at 00:54

## 2018-12-28 RX ADMIN — OXYCODONE AND ACETAMINOPHEN 2 TABLET: 5; 325 TABLET ORAL at 21:37

## 2018-12-28 RX ADMIN — ATORVASTATIN CALCIUM 80 MG: 40 TABLET, FILM COATED ORAL at 09:15

## 2018-12-28 RX ADMIN — POTASSIUM CHLORIDE 40 MEQ: 20 TABLET, EXTENDED RELEASE ORAL at 09:15

## 2018-12-28 RX ADMIN — HEPARIN SODIUM 5000 UNITS: 5000 INJECTION INTRAVENOUS; SUBCUTANEOUS at 21:38

## 2018-12-28 RX ADMIN — HEPARIN SODIUM 5000 UNITS: 5000 INJECTION INTRAVENOUS; SUBCUTANEOUS at 05:29

## 2018-12-28 RX ADMIN — IPRATROPIUM BROMIDE AND ALBUTEROL SULFATE 3 ML: .5; 3 SOLUTION RESPIRATORY (INHALATION) at 21:27

## 2018-12-28 RX ADMIN — Medication 20 ML: at 15:00

## 2018-12-28 RX ADMIN — HEPARIN SODIUM 5000 UNITS: 5000 INJECTION INTRAVENOUS; SUBCUTANEOUS at 13:00

## 2018-12-28 RX ADMIN — Medication 10 ML: at 15:00

## 2018-12-28 RX ADMIN — Medication 10 ML: at 21:39

## 2018-12-28 RX ADMIN — CEFTRIAXONE 1 G: 1 INJECTION, POWDER, FOR SOLUTION INTRAMUSCULAR; INTRAVENOUS at 21:26

## 2018-12-28 RX ADMIN — Medication 10 ML: at 14:00

## 2018-12-28 RX ADMIN — Medication 10 ML: at 05:30

## 2018-12-28 NOTE — PROGRESS NOTES
Problem: Mobility Impaired (Adult and Pediatric) Goal: *Acute Goals and Plan of Care (Insert Text) Physical Therapy Goals Initiated 12/27/2018 and to be accomplished within 7 days. 1.  Patient will complete all bed mobility with supervision/set-up in order to prepare for EOB/OOB activity. 2.  Patient will perform sit <> stand with minimal assistance/contact guard assist in order to prepare for OOB/gait activity. 3.  Patient will perform bed to chair transfers with minimal assistance/contact guard assist in order to promote mobility and encourage seated activity to progress towards their prior level of function. 4.  Patient will ambulate 15 feet with minimal assistance/contact guard assist using LRAD in order to prepare for safe negotiation of their environment. Outcome: Progressing Towards Goal 
 
PHYSICAL THERAPY: Daily TREATMENT Note INPATIENT: Medicare: Hospital Day: 9 Patient: Dimitri Salinas (18 y.o. female)    Date: 12/28/2018 Primary Diagnosis: Atherosclerotic PVD with intermittent claudication (HCC) Procedure(s) (LRB): 
left FEMORAL below knee to POPLITEAL BYPASS (Right), 7 Days Post-Op, Precautions: (None) Chart, physical therapy assessment, plan of care and goals were reviewed. PLOF: Independent ASSESSMENT: 
 
Patient supine in bed, agreeable to participation with PT. SBA for supine > sit with increased time to complete mobility. MOD A X 1 for sit > stand with RW for support. Patient reports increased discomfort in L foot with weight bearing. CGA for stand pivot transfer to recliner; patient moves very slowly but appears more steady today. Patient left sitting in recliner with all needs within reach; legs elevated. RN Maykel Gerber noted. BP at beginning of session: 107/74. BP at end of session 100/73. Progression toward goals: 
      Improving slowly and progressing toward goals PLAN: 
Patient continues to benefit from skilled intervention to address the above impairments. Continue treatment per established plan of care. EDUCATION:  
Education:  Patient was educated on the following topics: Purpose of PT, PT POC, safety with mobility, bed mobility, transfers. Verbalizes understanding. Barriers to Learning/Limitations: None Compensate with: visual, verbal, tactile, kinesthetic cues/model Discharge Recommendations:  Barrera Shelton Further Equipment Recommendations for Discharge:  rolling walker Factors which may impact discharge planning: N/A  
 
SUBJECTIVE:  
Patient stated My L leg feels worse than it did before.  OBJECTIVE DATA SUMMARY:  
Critical Behavior: 
Neurologic State: Alert Orientation Level: Oriented X4 Cognition: Follows commands G CODE:Mobility [id number] Current  CK= 40-59%   Goal  CK= 40-59%. The severity rating is based on the Other CGA - MOD A for mobilityFunctional Mobility: 
 
 
Functional Status Indep (I) Mod I Super-vision Min A Mod A Max A Total A Assist x2 Verbal cues Additional time Not tested Comments Rolling []  []  [] []    []    []  []  [] [] [] [x] Supine to sit []  []  [x] []  []  []  []  [] [] [] [] SBA Sit to supine []  []  [] []  []  []  []  [] [] [] [x] Sit to stand []  []  [] []  [x]  []  []  [] [] [] []   
Stand to sit []  []  [] [x]  []  []  []  [] [] [] [] Bed to chair transfers []  []  [x] []  []  []  []  [] [] [] [] Balance Good Judythe Lars Poor Unable Not tested Comments Sitting static [x]  []  []  []  [] Sitting dynamic [x]  []  []  []  []   
Standing static []  [x]  []  []  []   
Standing dynamic []  [x]  []  []  []   
 
Vital Signs Temp: 98.4 °F (36.9 °C) Pulse (Heart Rate): 99    
BP: 101/75 Resp Rate: 16    
O2 Sat (%): 92 % Pain: 
Pain/numbness in L LE with mobility, no rating provided. Improved with rest and leg elevation Activity Tolerance:  
Fair After treatment: Patient left in no apparent distress sitting up in chair Call bell left within reach Nursing notified [place] Time Calculation: 26 mins

## 2018-12-28 NOTE — PROGRESS NOTES
[facility]  [address] [facility] [address] [facility] [address], [address] [facility]   Dr. Adriana Albert, Dr. Ismael Tolliver, Dr. Justo Gray, & Dr. Claude Mitchell  [phone] FAX# [phone]    Progress Note    Patient: Nieves Bolanos MRN: [id number]  SSN: [id number]    YOB: 1965  Age: 48 y.o. Sex: female      Admit Date: 12/20/2018    LOS: 7 days     Subjective:     POD 6 from left Popliteal/tibial endarterectomy with patch angioplasty and Fem pop bypass. Pain controlled. Foot warm. Still on pressors but Bp improved. Objective:     Vitals:    12/27/18 1800 12/27/18 1830 12/27/18 1901 12/27/18 1915   BP: 98/61 100/58 93/61 (!) 86/69   Pulse: (!) 102 (!) 102 (!) 101 (!) 101   Resp: 27 25 18 15   Temp:       SpO2: (!) 59%      Weight:       Height:            Intake and Output:  Current Shift: No intake/output data recorded. Last three shifts: 12/26 0701 - 12/27 1900  In: 550 [P.O.:240; I.V.:310]  Out: 580 [Urine:550; Drains:30]    Physical Exam:   GENERAL: alert, cooperative, no distress, appears stated age  EYE: conjunctivae/corneas clear. PERRL, EOM's intact. Fundi benign  LYMPHATIC: Cervical, supraclavicular, and axillary nodes normal.   THROAT & NECK: normal and no erythema or exudates noted. LUNG: clear to auscultation bilaterally  HEART: regular rate and rhythm, S1, S2 normal, no murmur, click, rub or gallop  ABDOMEN: soft, non-tender. Bowel sounds normal. No masses,  no organomegaly  EXTREMITIES:  RLE c/d/i wound. LLE palpable DP, doppler PT. Doppler graft pulse. Decrease sensation at toes. Motor intact. NEUROLOGIC: AOx3. Gait normal. Reflexes and motor strength normal and symmetric. Cranial nerves 2-12 and sensation grossly intact.   PSYCHIATRIC: non focal    Lab/Data Review:  BMP:   Lab Results   Component Value Date/Time     12/27/2018 05:49 AM    K 4.1 12/27/2018 05:49 AM     12/27/2018 05:49 AM    CO2 25 12/27/2018 05:49 AM    AGAP 9 12/27/2018 05:49 AM     (H) 12/27/2018 05:49 AM    BUN 11 12/27/2018 05:49 AM    CREA 0.58 (L) 12/27/2018 05:49 AM    GFRAA >60 12/27/2018 05:49 AM    GFRNA >60 12/27/2018 05:49 AM     CBC:   Lab Results   Component Value Date/Time    WBC 9.4 12/27/2018 05:49 AM    HGB 8.4 (L) 12/27/2018 05:49 AM    HCT 26.9 (L) 12/27/2018 05:49 AM     12/27/2018 05:49 AM            Assessment:     Active Problems:    Atherosclerotic PVD with intermittent claudication ([facility] [address] (12/20/2018)      Hypokalemia (12/20/2018)      CAD (coronary artery disease) (12/20/2018)      DM (diabetes mellitus) ([facility] [address] (12/20/2018)      HTN (hypertension) (12/20/2018)      Depression (12/20/2018)      Current smoker (12/20/2018)      Anemia (12/22/2018)        Plan:     OOB to chiar  Wean pressor as tolerated. Plavix/ASA.    PT/OT   D/C planning for tomorrow    Signed By: Josue Ocampo MD     December 27, 2018

## 2018-12-28 NOTE — PROGRESS NOTES
Offered the pt FOC for home care, she chose [facility]. DC pending. Referral sent to intake via [employer] and called to intake nurseChloe. FOC placed in the chart;copy given to thep. Pt verified the contact information on the face sheet is correct. Care Management Interventions PCP Verified by CM: Yes(friday) Palliative Care Criteria Met (RRAT>21 & CHF Dx)?: No 
Mode of Transport at Discharge: Other (see comment)(Mom) Transition of Care Consult (CM Consult): Home Health [facility] - INPATIENT: Yes MyChart Signup: No 
Discharge Durable Medical Equipment: No 
Physical Therapy Consult: No 
Occupational Therapy Consult: No 
Speech Therapy Consult: No 
Current Support Network: Other(Lives with grnadson) Confirm Follow Up Transport: Family Plan discussed with Pt/Family/Caregiver: Yes Freedom of Choice Offered: Yes [employer] Information Provided?: No 
Discharge Location Discharge Placement: Home with home health

## 2018-12-28 NOTE — ROUTINE PROCESS
Beside shift change report given to Mary Agudelo RN. Patient is drowsy but easy to arouse and she is oriented. Patient has not complained of pain during this shift. Oncoming nurses questions were answered, drips were verified, and patient was introduced to nurse. No acute distress noted in patient at this time.

## 2018-12-28 NOTE — PROGRESS NOTES
Acknowledged duplicate PT orders. Patient already on PT caseload. Please refer to PT evaluation for discharge recommendations and plan of care. Thank you, Mingo Mae PT, DPT Office extension: [phone] Pager #: [phone]

## 2018-12-28 NOTE — ROUTINE PROCESS
Report called to MARTIN Barone. SBAR reviewed, current vital signs, blood glucose,  Diet, appetite, IV access, BM, skin incisions & staples BLE. Procedure from 12/20/18.   
Patient transferred via recliner with RN

## 2018-12-28 NOTE — PROGRESS NOTES
Received from ICU via chair. Report received from Kole Calderon. Patient is alert and oriented and appears comfortable with no acute distress. Patient is sitting up in chair and tolerating it well. Will continue to monitor condition closely. No new orders received.

## 2018-12-28 NOTE — PROGRESS NOTES
Late entry for 0745 12/27/18    Bedside and Verbal shift change report given to Charles Earl RN (oncoming nurse) by Yong Krishnan (offgoing nurse). Report included the following information SBAR, Kardex, MAR and Recent Results. SITUATION:  Code Status: Full Code  Reason for Admission: Atherosclerotic PVD with intermittent claudication [facility])  Hospital day: 7  Problem List:       Hospital Problems  Date Reviewed: 11/4/2014          Codes Class Noted POA    Anemia ICD-10-CM: D64.9  ICD-9-CM: 285.9  12/22/2018 Unknown        Atherosclerotic PVD with intermittent claudication ([facility] [address] ICD-10-CM: I70.219  ICD-9-CM: 440.21  12/20/2018 Unknown        Hypokalemia ICD-10-CM: E87.6  ICD-9-CM: 276.8  12/20/2018 Yes        CAD (coronary artery disease) (Chronic) ICD-10-CM: I25.10  ICD-9-CM: 414.00  12/20/2018 Unknown        DM (diabetes mellitus) ([facility] [address] (Chronic) ICD-10-CM: E11.9  ICD-9-CM: 250.00  12/20/2018 Unknown        HTN (hypertension) (Chronic) ICD-10-CM: I10  ICD-9-CM: 401.9  12/20/2018 Unknown        Depression (Chronic) ICD-10-CM: F32.9  ICD-9-CM: 311  12/20/2018 Unknown        Current smoker ICD-10-CM: F17.200  ICD-9-CM: 305.1  12/20/2018 Yes              BACKGROUND:   Past Medical History:   Past Medical History:   Diagnosis Date    Arthritis     Asthma     Depression     Hypertension     Unspecified sleep apnea       Patient taking anticoagulants yes    Patient has a defibrillator: no    History of shots NO for example, flu, pneumonia, tetanus   Isolation History NO for example, MRSA, CDiff    ASSESSMENT:  Changes in Assessment Throughout Shift: No significant changes noted. Significant Changes in 24 hours (for example, RR/code, fall)  Patient has Central Line: no Reasons if yes:   Patient has Ramos Cath: no Reasons if yes:     Mobility Issues  PT  IV Patency  OR Checklist  Pending Tests    Last Vitals:  Vitals w/ MEWS Score (last day)     Date/Time MEWS Score Pulse Resp Temp BP Level of Consciousness SpO2    12/27/18 1915    101  (Abnormal)   15    86/69  (Abnormal)         12/27/18 1901    101  (Abnormal)   18    93/61        12/27/18 1830    102  (Abnormal)   25    100/58        12/27/18 1800    102  (Abnormal)   27    98/61    59 %  (Abnormal)     12/27/18 1730    84  21    87/61  (Abnormal)     92 %    12/27/18 1700    91  21    90/66        12/27/18 1630    105  (Abnormal)   30    87/73  (Abnormal)     74 %  (Abnormal)     12/27/18 1600    88  22  97.5 °F (36.4 °C)  81/60  (Abnormal)     99 %    12/27/18 1530    89  23    77/60  (Abnormal)     98 %    12/27/18 1500    88  24    96/63    99 %    12/27/18 1430    92  20    97/67    100 %    12/27/18 1415    96  18    108/81    100 %    12/27/18 1400    94  24    100/69    100 %    12/27/18 1345    104  (Abnormal)   21    85/63  (Abnormal)     100 %    12/27/18 1330    103  (Abnormal)   24    72/54  (Abnormal)     99 %    12/27/18 1315    107  (Abnormal)   25    83/64  (Abnormal)     99 %    12/27/18 1300    111  (Abnormal)   23    97/68    100 %    12/27/18 1245    110  (Abnormal)   27    92/75    99 %    12/27/18 1230    113  (Abnormal)   18    77/58  (Abnormal)     100 %    12/27/18 1215    105  (Abnormal)   27    83/53  (Abnormal)     99 %    12/27/18 1200    105  (Abnormal)   25  98.6 °F (37 °C)  79/56  (Abnormal)     100 %    12/27/18 1145    104  (Abnormal)   24    90/65    100 %    12/27/18 1130    104  (Abnormal)   28        100 %    12/27/18 1115    104  (Abnormal)   28    97/58    100 %    12/27/18 1100    116  (Abnormal)   27        61 %  (Abnormal)     12/27/18 1045    101  (Abnormal)   24    94/65    45 %  (Abnormal)     12/27/18 1030    106  (Abnormal)   27    64/14  (Abnormal)         12/27/18 1015    107  (Abnormal)   19    82/48  (Abnormal)         12/27/18 1000    101  (Abnormal)   23    129/87    49 %  (Abnormal)     12/27/18 0945    105  (Abnormal)   21    112/79   75 %  (Abnormal)     12/27/18 0930    93  26    82/39  (Abnormal)         12/27/18 0915    94  28    94/63        12/27/18 0900    105  (Abnormal)   23    95/73    96 %    12/27/18 0845    97  22    113/58    98 %    12/27/18 0830    110  (Abnormal)   24    101/66        12/27/18 0800  3  88  21  97.9 °F (36.6 °C)  97/71  Alert  100 %    12/27/18 0745    86  25    102/65    100 %    12/27/18 0730    90  24        99 %    12/27/18 0715    90  28    82/65  (Abnormal)     100 %    12/27/18 0700    90  27    87/66  (Abnormal)         12/27/18 0600    102  (Abnormal)   34  (Abnormal)     79/58  (Abnormal)         12/27/18 0530    93  31  (Abnormal)     89/68  (Abnormal)     100 %    12/27/18 0500    86  29    88/68  (Abnormal)     98 %    12/27/18 0430      30    84/60  (Abnormal)     100 %    12/27/18 0415    88  34  (Abnormal)     81/60  (Abnormal)     100 %    12/27/18 0400    94  30    88/60  (Abnormal)     99 %    12/27/18 0345    100  36  (Abnormal)     84/62  (Abnormal)     99 %    12/27/18 0330    90  28    81/54  (Abnormal)     100 %    12/27/18 0315    89  28    81/55  (Abnormal)     100 %    12/27/18 0300    90  27    79/52  (Abnormal)     100 %    12/27/18 0245    95  29    91/67    99 %    12/27/18 0230    98  34  (Abnormal)     89/65  (Abnormal)     99 %    12/27/18 0215    97  30    95/73    99 %    12/27/18 0200    102  (Abnormal)   27    80/49  (Abnormal)     46 %  (Abnormal)     12/27/18 0155    108  (Abnormal)   20    73/48  (Abnormal)     94 %    12/27/18 0130    94  27    80/53  (Abnormal)     96 %    12/27/18 0115    94  34  (Abnormal)     74/50  (Abnormal)     94 %    12/27/18 0100    101  (Abnormal)   31  (Abnormal)     81/50  (Abnormal)     94 %    12/27/18 0045    106  (Abnormal)   35  (Abnormal)     90/45    93 %    12/27/18 0030    113  (Abnormal)   20    94/62    94 %    12/27/18 0015    112 (Abnormal)   35  (Abnormal)     93/60    94 %    12/27/18 0001          115/73        12/27/18 0000    108  (Abnormal)   29        94 %    12/26/18 2351        100.6 °F (38.1 °C)  (Abnormal)           12/26/18 2345    107  (Abnormal)   37  (Abnormal)     111/66    92 %    12/26/18 2330    101  (Abnormal)   33  (Abnormal)     121/67    97 %    12/26/18 2315    100  32  (Abnormal)     111/68        12/26/18 2300    100  35  (Abnormal)     109/52        12/26/18 2230    103  (Abnormal)   28    66/34  (Abnormal)     96 %    12/26/18 2200    106  (Abnormal)   22    117/73    95 %    12/26/18 2100    104  (Abnormal)   28    117/67    95 %    12/26/18 2031    104  (Abnormal)   26    117/61    95 %    12/26/18 2000    107  (Abnormal)   28  99.8 °F (37.7 °C)  122/72    95 %    12/26/18 1930    100  33  (Abnormal)     125/84    98 %    12/26/18 1900    97  30    129/65    96 %    12/26/18 1700    100  23    98/70    97 %    12/26/18 1600        98.9 °F (37.2 °C)          12/26/18 1500    104  (Abnormal)   26    100/66        12/26/18 1400    93  27    112/75        12/26/18 1300    104  (Abnormal)   25    101/81        12/26/18 1200  3  97  28  100 °F (37.8 °C)  117/94  (Abnormal)   Responds to Voice  100 %    12/26/18 1100    98  33  (Abnormal)     107/63        12/26/18 1000    112  (Abnormal)   27    120/70        12/26/18 0900    112  (Abnormal)   24    110/69        12/26/18 0801  5  118  (Abnormal)   24  100 °F (37.8 °C)  101/67  Responds to Voice      12/26/18 0800    117  (Abnormal)   26        100 %    12/26/18 0700    93  26    129/74  Alert      12/26/18 0600    97  28    94/42        12/26/18 0500    109  (Abnormal)   25    134/72  Alert      12/26/18 0400  2  79  25  99.3 °F (37.4 °C)  133/64  Alert  100 %    12/26/18 0300    76  22    144/63    100 %    12/26/18 0200    80  20    151/66    100 %    12/26/18 0100    77  26    128/63    100 %    12/26/18 0000  2  84  24  100.1 °F (37.8 °C)  128/62  Alert  100 %            PAIN    Pain Assessment last completed @ 0700 8/10. Given medication as ordered. MD here and added toradol as well. Pain Intensity 1: 0 (12/27/18 1600)    Pain Location 1: Back    Pain Intervention(s) 1: Medication (see MAR)    Patient Stated Pain Goal: 0    Last 3 Weights:  Last 3 Recorded Weights in this Encounter    12/23/18 1230 12/24/18 0800 12/27/18 0756   Weight: 76.2 kg (167 lb 15.9 oz) 76.2 kg (167 lb 15.9 oz) 76.2 kg (167 lb 15.9 oz)   Weight change:     INTAKE/OUPUT    Current Shift: No intake/output data recorded. Last three shifts: 12/26 0701 - 12/27 1900  In: 550 [P.O.:240; I.V.:310]  Out: 80 [Urine:550; Drains:30]    RECOMMENDATIONS AND DISCHARGE PLANNING  Patient needs and requests: diabetic teaching    Pending tests/procedures: none     Discharge plan for patient: home    Discharge planning Needs or Barriers: insurance and pain    Estimated Discharge Date: unknown Posted on Whiteboard in Patients Room: no       \"HEALS\" SAFETY CHECK  A safety check occurred in the patient's room between off going nurse and oncoming nurse listed above. The safety check included the below items:    H  High Alert Medications Verify all high alert medication drips (heparin, PCA, etc.)  E  Equipment Suction is set up for ALL patients (with ronker)  Red plugs utilized for all equipment (IV pumps, etc.)  WOWs wiped down at end of shift. Room stocked with oxygen, suction, and other unit-specific supplies  A  Alarms Bed alarm is set for fall risk patients  Ensure chair alarm is in place and activated if patient is up in a chair  L  Lines Check IV for any infiltration  Ramos bag is empty if patient has a Ramos   Tubing and IV bags are labeled  S  Safety  Room is clean, patient is clean, and equipment is clean. Hallways are clear from equipment besides carts.    Fall bracelet on for fall risk patients  Ensure room is clear and free of clutter  Suction is set up for ALL patients (with chasity)  Hallways are clear from equipment besides carts.    Isolation precautions followed, supplies available outside room, sign posted    Zakia Huff

## 2018-12-28 NOTE — PROGRESS NOTES
[facility] 
[address], [address] [facility] [address] Cooper Verma, Dr. Mekhi Tomas , Dr. Luise Graff Dr. Reather Bernheim [phone] FAX# [phone] PROGRESS NOTE Patient: Laxmi Georges MRN: [id number]  SSN: [id number] YOB: 1965  Age: 48 y.o. Sex: female Date: 12/28/2018 Hospital: [facility] Post-Op Day: 7 Plan:  
increase activity and out of bed Discharge when medically stable Assessment:  
good progress and wounds fine Subjective: No complaints Not required Objective:  
Admit weight: Weight: 74.8 kg (165 lb) Last recorded weight: Weight: 76.2 kg (167 lb 15.9 oz) Visit Vitals BP 90/70 (BP 1 Location: Left arm, BP Patient Position: At rest) Pulse 99 Temp 98.6 °F (37 °C) Resp 18 Ht 5' 5\" (1.651 m) Wt 76.2 kg (167 lb 15.9 oz) SpO2 95% BMI 27.96 kg/m² Intake/Output Summary (Last 24 hours) at 12/28/2018 1457 Last data filed at 12/28/2018 1335 Gross per 24 hour Intake 350 ml Output 301 ml Net 49 ml Physical exam was negative except for: Bilateral leg incisions clean and dry Labs:  
 
Recent Labs  
  12/28/18 
0500 12/27/18 
0549 12/26/18 
0315 WBC 6.8 9.4 7.3 HGB 7.6* 8.4* 8.7* HCT 25.3* 26.9* 27.5*  
 346 268 RDW 18.0* 17.3* 16.9* Recent Labs  
  12/28/18 
0500 12/27/18 
0549 12/26/18 
0948 12/26/18 
0315  137 137  --   
K 4.5 4.1 4.0 4.2  103 103  --   
CO2 26 25 26  --   
GLU 86 128* 120*  --   
BUN 13 11 9  --   
CREA 0.50* 0.58* 0.63  --   
CA 7.9* 7.9* 8.1*  --   
MG  --   --   --  2.0 PHOS  --   --   --  4.0 ALB 2.2* 2.3* 2.5*  --   
SGOT 80* 69* 53*  --   
ALT 51 41 33  -- No results for input(s): PH, PCO2, PO2, HCO3, FIO2 in the last 72 hours.  
 
 
Laura Rojo MD, FACS

## 2018-12-28 NOTE — PROGRESS NOTES
Bedside shift report given to Vonda Pittman RN. Reviewed levophed gtt and perimeters. Questions asked and answered. Last pain assessment @ 0400 was \"0\". Patient is drowsy but easily arouses with voice. Skin assessed. Patient not noted in any acute distress at this time.

## 2018-12-28 NOTE — PROGRESS NOTES
Progress Note Patient: Leonor Batista               Sex: female          DOA: 12/20/2018 YOB: 1965      Age:  48 y.o.        LOS:  LOS: 8 days Subjective / Interval Hx I   
  
Patient post Left popliteal and tibial endarterectomy, patch angioplasty. left FEMORAL below knee to POPLITEAL BYPASS yesterday PM. No fever. C/o pain in the middle toe . On Levophed per Vascular surgery . EKG was noted to have EKG abnormality. Patient denies CP. 
 
12/23 : Episodes of hypotension noted , now resolved . On pressors Levophed. Continue to hold BP medications 12/24:Pt saying that she wants to go home 12/25:weaning off pressors;received blood transfusion 12/27:no complain Objective:  
  
Visit Vitals /67 Pulse 93 Temp 98.4 °F (36.9 °C) Resp 24 Ht 5' 5\" (1.651 m) Wt 76.2 kg (167 lb 15.9 oz) SpO2 100% BMI 27.96 kg/m² P/E 
NAD,sitting comfortably in the chair. Heent:perrla,at/nc,mouth moist. 
Lungs ctab Heart s1s2 nl,no m/g Abdm:soft,not tender,bs present. Extr:s/p surgery,staples in place. Palpable pedis pulse rt,noted with doppler in the left. Neuro:aaox3. Intake and Output: 
Current Shift:  No intake/output data recorded. Last three shifts:  12/26 1901 - 12/28 0700 In: 684.6 [P.O.:420; I.V.:264.6] Out: 280 [Urine:250; Drains:30] Recent Results (from the past 48 hour(s)) GLUCOSE, POC Collection Time: 12/26/18 11:38 AM  
Result Value Ref Range Glucose (POC) 156 (H) 70 - 110 mg/dL GLUCOSE, POC Collection Time: 12/26/18  4:48 PM  
Result Value Ref Range Glucose (POC) 154 (H) 70 - 110 mg/dL CALCIUM, IONIZED Collection Time: 12/26/18  5:50 PM  
Result Value Ref Range Ionized Calcium 1.07 (L) 1.12 - 1.32 MMOL/L  
GLUCOSE, POC Collection Time: 12/26/18  9:09 PM  
Result Value Ref Range Glucose (POC) 124 (H) 70 - 110 mg/dL CBC WITH AUTOMATED DIFF Collection Time: 12/27/18  5:49 AM  
Result Value Ref Range WBC 9.4 4.6 - 13.2 K/uL  
 RBC 3.78 (L) 4.20 - 5.30 M/uL HGB 8.4 (L) 12.0 - 16.0 g/dL HCT 26.9 (L) 35.0 - 45.0 % MCV 71.2 (L) 74.0 - 97.0 FL  
 MCH 22.2 (L) 24.0 - 34.0 PG  
 MCHC 31.2 31.0 - 37.0 g/dL  
 RDW 17.3 (H) 11.6 - 14.5 % PLATELET 375 791 - 678 K/uL MPV 11.4 9.2 - 11.8 FL  
 NEUTROPHILS 71 40 - 73 % LYMPHOCYTES 19 (L) 21 - 52 % MONOCYTES 9 3 - 10 % EOSINOPHILS 1 0 - 5 % BASOPHILS 0 0 - 2 %  
 ABS. NEUTROPHILS 6.7 1.8 - 8.0 K/UL  
 ABS. LYMPHOCYTES 1.8 0.9 - 3.6 K/UL  
 ABS. MONOCYTES 0.8 0.05 - 1.2 K/UL  
 ABS. EOSINOPHILS 0.1 0.0 - 0.4 K/UL  
 ABS. BASOPHILS 0.0 0.0 - 0.1 K/UL  
 DF AUTOMATED METABOLIC PANEL, COMPREHENSIVE Collection Time: 12/27/18  5:49 AM  
Result Value Ref Range Sodium 137 136 - 145 mmol/L Potassium 4.1 3.5 - 5.5 mmol/L Chloride 103 100 - 108 mmol/L  
 CO2 25 21 - 32 mmol/L Anion gap 9 3.0 - 18 mmol/L Glucose 128 (H) 74 - 99 mg/dL BUN 11 7.0 - 18 MG/DL Creatinine 0.58 (L) 0.6 - 1.3 MG/DL  
 BUN/Creatinine ratio 19 12 - 20 GFR est AA >60 >60 ml/min/1.73m2 GFR est non-AA >60 >60 ml/min/1.73m2 Calcium 7.9 (L) 8.5 - 10.1 MG/DL Bilirubin, total 0.4 0.2 - 1.0 MG/DL  
 ALT (SGPT) 41 13 - 56 U/L  
 AST (SGOT) 69 (H) 15 - 37 U/L Alk. phosphatase 71 45 - 117 U/L Protein, total 6.6 6.4 - 8.2 g/dL Albumin 2.3 (L) 3.4 - 5.0 g/dL Globulin 4.3 (H) 2.0 - 4.0 g/dL A-G Ratio 0.5 (L) 0.8 - 1.7 GLUCOSE, POC Collection Time: 12/27/18  8:25 AM  
Result Value Ref Range Glucose (POC) 122 (H) 70 - 110 mg/dL GLUCOSE, POC Collection Time: 12/27/18 11:35 AM  
Result Value Ref Range Glucose (POC) 115 (H) 70 - 110 mg/dL GLUCOSE, POC Collection Time: 12/27/18  3:25 PM  
Result Value Ref Range Glucose (POC) 121 (H) 70 - 110 mg/dL GLUCOSE, POC Collection Time: 12/27/18  9:30 PM  
Result Value Ref Range Glucose (POC) 98 70 - 110 mg/dL GLUCOSE, POC  Collection Time: 12/28/18  4:58 AM  
 Result Value Ref Range Glucose (POC) 91 70 - 110 mg/dL CBC WITH AUTOMATED DIFF Collection Time: 12/28/18  5:00 AM  
Result Value Ref Range WBC 6.8 4.6 - 13.2 K/uL  
 RBC 3.49 (L) 4.20 - 5.30 M/uL HGB 7.6 (L) 12.0 - 16.0 g/dL HCT 25.3 (L) 35.0 - 45.0 % MCV 72.5 (L) 74.0 - 97.0 FL  
 MCH 21.8 (L) 24.0 - 34.0 PG  
 MCHC 30.0 (L) 31.0 - 37.0 g/dL  
 RDW 18.0 (H) 11.6 - 14.5 % PLATELET 199 413 - 957 K/uL MPV 11.3 9.2 - 11.8 FL  
 NEUTROPHILS 69 40 - 73 % LYMPHOCYTES 18 (L) 21 - 52 % MONOCYTES 9 3 - 10 % EOSINOPHILS 4 0 - 5 % BASOPHILS 0 0 - 2 %  
 ABS. NEUTROPHILS 4.7 1.8 - 8.0 K/UL  
 ABS. LYMPHOCYTES 1.3 0.9 - 3.6 K/UL  
 ABS. MONOCYTES 0.6 0.05 - 1.2 K/UL  
 ABS. EOSINOPHILS 0.3 0.0 - 0.4 K/UL  
 ABS. BASOPHILS 0.0 0.0 - 0.1 K/UL  
 DF AUTOMATED METABOLIC PANEL, COMPREHENSIVE Collection Time: 12/28/18  5:00 AM  
Result Value Ref Range Sodium 138 136 - 145 mmol/L Potassium 4.5 3.5 - 5.5 mmol/L Chloride 106 100 - 108 mmol/L  
 CO2 26 21 - 32 mmol/L Anion gap 6 3.0 - 18 mmol/L Glucose 86 74 - 99 mg/dL BUN 13 7.0 - 18 MG/DL Creatinine 0.50 (L) 0.6 - 1.3 MG/DL  
 BUN/Creatinine ratio 26 (H) 12 - 20 GFR est AA >60 >60 ml/min/1.73m2 GFR est non-AA >60 >60 ml/min/1.73m2 Calcium 7.9 (L) 8.5 - 10.1 MG/DL Bilirubin, total 0.3 0.2 - 1.0 MG/DL  
 ALT (SGPT) 51 13 - 56 U/L  
 AST (SGOT) 80 (H) 15 - 37 U/L Alk. phosphatase 78 45 - 117 U/L Protein, total 6.4 6.4 - 8.2 g/dL Albumin 2.2 (L) 3.4 - 5.0 g/dL Globulin 4.2 (H) 2.0 - 4.0 g/dL A-G Ratio 0.5 (L) 0.8 - 1.7 GLUCOSE, POC Collection Time: 12/28/18  8:25 AM  
Result Value Ref Range Glucose (POC) 82 70 - 110 mg/dL Lab/Data Reviewed: All lab results for the last 24 hours reviewed. XRays were reviewed in past 24 hours Medications Reviewed Assessment/Plan Active Problems: 
  Atherosclerotic PVD with intermittent claudication ([facility] [address] (12/20/2018) Hypokalemia (12/20/2018) CAD (coronary artery disease) (12/20/2018) DM (diabetes mellitus) ([facility] [address] (12/20/2018) HTN (hypertension) (12/20/2018) Depression (12/20/2018) Current smoker (12/20/2018) Anemia (12/22/2018) Hypernatremia Rhabdomyolysis CARE PLAN: 
  
PVD with Claudication  
- Initially on Heparin drip - pain control as needed - s/p Left popliteal and tibial endarterectomy, patch angioplasty. left FEMORAL below knee to POPLITEAL BYPASS 
- Patient on Levophed to keep SBP > 100 per vascular surgery - Vascular Surgery on board - On levaquin,ceftriaxone - Off pressors now  
  
CAD 
- Plavix - Losartan  
- aspirin - Lipitor  
  
DM 
- SSI  
- A1c 
  
HTN 
- hold  losartan , Norvasc  due to hypotension on pressor  
  
Depression - Wellbutrin , Seroquel, clonepin  
  
Smoker  
- advise cessation  
- Nicotine Patch  
  
Hypokalemia  
- replace  
- resolved Hypernatremia  
- resolved Rhabdomyolysis - resolved Anemia  
- microcytic  
- received blood transfusion yesterday and Hgb now 8.7 Cough - cxr 12/22 c/w right greater than left lower lobe airspace disease or atelectasis 
- lasix 20 mg IV X 1 was given on that day before cxr results known 
- albuterol as needed  
  
DM Neuropathy 
- Neurontin  
  
DVT prophylaxis Full code Disposition:1-2 days Justin Justin MD 
December 28, 2018

## 2018-12-28 NOTE — MANAGEMENT PLAN
Discharge/Transition Planning Pt out of bed in chair. Wants Home with [facility] as plan, but will let CM know if changes mind. CM would need to know today asap for SNF options if goes this route. Will get HH orders from Dr Sonia Avalos RN BSN Outcomes Manager Pager # [phone]

## 2018-12-28 NOTE — PROGRESS NOTES
Problem: Falls - Risk of  Goal: *Absence of Falls  Document Kiko Fall Risk and appropriate interventions in the flowsheet. Outcome: Progressing Towards Goal  Fall Risk Interventions:  Mobility Interventions: Patient to call before getting OOB         Medication Interventions: Patient to call before getting OOB    Elimination Interventions: Call light in reach, Patient to call for help with toileting needs             Problem: Pressure Injury - Risk of  Goal: *Prevention of pressure injury  Document Carlo Scale and appropriate interventions in the flowsheet. Outcome: Progressing Towards Goal  Pressure Injury Interventions:  Sensory Interventions: Assess changes in LOC    Moisture Interventions: Absorbent underpads    Activity Interventions: Increase time out of bed, PT/OT evaluation, Pressure redistribution bed/mattress(bed type)    Mobility Interventions: PT/OT evaluation, Pressure redistribution bed/mattress (bed type), HOB 30 degrees or less    Nutrition Interventions: Document food/fluid/supplement intake    Friction and Shear Interventions: Transferring/repositioning devices               Comments: Patient is progressing towards goal of discharge to home. She is working with PT/OT. Patient is alert and oriented and aware of the need to use her call bell for assistance before getting OOB.

## 2018-12-29 LAB
ANION GAP SERPL CALC-SCNC: 8 MMOL/L (ref 3–18)
BACTERIA SPEC CULT: NORMAL
BACTERIA SPEC CULT: NORMAL
BASOPHILS # BLD: 0 K/UL (ref 0–0.1)
BASOPHILS NFR BLD: 0 % (ref 0–2)
BUN SERPL-MCNC: 9 MG/DL (ref 7–18)
BUN/CREAT SERPL: 14 (ref 12–20)
CALCIUM SERPL-MCNC: 7.7 MG/DL (ref 8.5–10.1)
CHLORIDE SERPL-SCNC: 106 MMOL/L (ref 100–108)
CO2 SERPL-SCNC: 25 MMOL/L (ref 21–32)
CREAT SERPL-MCNC: 0.64 MG/DL (ref 0.6–1.3)
DIFFERENTIAL METHOD BLD: ABNORMAL
EOSINOPHIL # BLD: 0.2 K/UL (ref 0–0.4)
EOSINOPHIL NFR BLD: 4 % (ref 0–5)
ERYTHROCYTE [DISTWIDTH] IN BLOOD BY AUTOMATED COUNT: 18 % (ref 11.6–14.5)
ERYTHROCYTE [DISTWIDTH] IN BLOOD BY AUTOMATED COUNT: 18.2 % (ref 11.6–14.5)
GLUCOSE BLD STRIP.AUTO-MCNC: 114 MG/DL (ref 70–110)
GLUCOSE BLD STRIP.AUTO-MCNC: 116 MG/DL (ref 70–110)
GLUCOSE BLD STRIP.AUTO-MCNC: 137 MG/DL (ref 70–110)
GLUCOSE BLD STRIP.AUTO-MCNC: 97 MG/DL (ref 70–110)
GLUCOSE SERPL-MCNC: 99 MG/DL (ref 74–99)
HCT VFR BLD AUTO: 24.3 % (ref 35–45)
HCT VFR BLD AUTO: 25 % (ref 35–45)
HGB BLD-MCNC: 7.5 G/DL (ref 12–16)
HGB BLD-MCNC: 7.9 G/DL (ref 12–16)
LYMPHOCYTES # BLD: 1.5 K/UL (ref 0.9–3.6)
LYMPHOCYTES NFR BLD: 27 % (ref 21–52)
MCH RBC QN AUTO: 22.4 PG (ref 24–34)
MCH RBC QN AUTO: 22.8 PG (ref 24–34)
MCHC RBC AUTO-ENTMCNC: 30.9 G/DL (ref 31–37)
MCHC RBC AUTO-ENTMCNC: 31.6 G/DL (ref 31–37)
MCV RBC AUTO: 72.3 FL (ref 74–97)
MCV RBC AUTO: 72.5 FL (ref 74–97)
MONOCYTES # BLD: 0.6 K/UL (ref 0.05–1.2)
MONOCYTES NFR BLD: 11 % (ref 3–10)
NEUTS SEG # BLD: 3.2 K/UL (ref 1.8–8)
NEUTS SEG NFR BLD: 58 % (ref 40–73)
PLATELET # BLD AUTO: 254 K/UL (ref 135–420)
PLATELET # BLD AUTO: 285 K/UL (ref 135–420)
PMV BLD AUTO: 10.6 FL (ref 9.2–11.8)
PMV BLD AUTO: 11 FL (ref 9.2–11.8)
POTASSIUM SERPL-SCNC: 3.9 MMOL/L (ref 3.5–5.5)
RBC # BLD AUTO: 3.35 M/UL (ref 4.2–5.3)
RBC # BLD AUTO: 3.46 M/UL (ref 4.2–5.3)
SERVICE CMNT-IMP: NORMAL
SERVICE CMNT-IMP: NORMAL
SODIUM SERPL-SCNC: 139 MMOL/L (ref 136–145)
WBC # BLD AUTO: 5.5 K/UL (ref 4.6–13.2)
WBC # BLD AUTO: 6.5 K/UL (ref 4.6–13.2)

## 2018-12-29 PROCEDURE — 65660000000 HC RM CCU STEPDOWN

## 2018-12-29 PROCEDURE — 87040 BLOOD CULTURE FOR BACTERIA: CPT

## 2018-12-29 PROCEDURE — 74011250636 HC RX REV CODE- 250/636: Performed by: SURGERY

## 2018-12-29 PROCEDURE — 85025 COMPLETE CBC W/AUTO DIFF WBC: CPT

## 2018-12-29 PROCEDURE — 36415 COLL VENOUS BLD VENIPUNCTURE: CPT

## 2018-12-29 PROCEDURE — 74011250636 HC RX REV CODE- 250/636: Performed by: HOSPITALIST

## 2018-12-29 PROCEDURE — 86923 COMPATIBILITY TEST ELECTRIC: CPT

## 2018-12-29 PROCEDURE — 51798 US URINE CAPACITY MEASURE: CPT

## 2018-12-29 PROCEDURE — 74011250637 HC RX REV CODE- 250/637: Performed by: FAMILY MEDICINE

## 2018-12-29 PROCEDURE — 74011250637 HC RX REV CODE- 250/637: Performed by: NURSE PRACTITIONER

## 2018-12-29 PROCEDURE — 36592 COLLECT BLOOD FROM PICC: CPT

## 2018-12-29 PROCEDURE — 74011250637 HC RX REV CODE- 250/637: Performed by: INTERNAL MEDICINE

## 2018-12-29 PROCEDURE — 80048 BASIC METABOLIC PNL TOTAL CA: CPT

## 2018-12-29 PROCEDURE — 74011000258 HC RX REV CODE- 258: Performed by: INTERNAL MEDICINE

## 2018-12-29 PROCEDURE — 74011250636 HC RX REV CODE- 250/636: Performed by: INTERNAL MEDICINE

## 2018-12-29 PROCEDURE — 85027 COMPLETE CBC AUTOMATED: CPT

## 2018-12-29 PROCEDURE — 74011250636 HC RX REV CODE- 250/636: Performed by: NURSE PRACTITIONER

## 2018-12-29 PROCEDURE — 86900 BLOOD TYPING SEROLOGIC ABO: CPT

## 2018-12-29 PROCEDURE — 82962 GLUCOSE BLOOD TEST: CPT

## 2018-12-29 PROCEDURE — P9016 RBC LEUKOCYTES REDUCED: HCPCS

## 2018-12-29 RX ORDER — SODIUM CHLORIDE 9 MG/ML
250 INJECTION, SOLUTION INTRAVENOUS AS NEEDED
Status: DISCONTINUED | OUTPATIENT
Start: 2018-12-29 | End: 2019-01-01 | Stop reason: HOSPADM

## 2018-12-29 RX ORDER — SODIUM CHLORIDE 9 MG/ML
75 INJECTION, SOLUTION INTRAVENOUS CONTINUOUS
Status: DISCONTINUED | OUTPATIENT
Start: 2018-12-29 | End: 2018-12-29

## 2018-12-29 RX ORDER — SODIUM CHLORIDE 9 MG/ML
250 INJECTION, SOLUTION INTRAVENOUS AS NEEDED
Status: DISCONTINUED | OUTPATIENT
Start: 2018-12-29 | End: 2018-12-30

## 2018-12-29 RX ORDER — HYDROMORPHONE HYDROCHLORIDE 1 MG/ML
1 INJECTION, SOLUTION INTRAMUSCULAR; INTRAVENOUS; SUBCUTANEOUS
Status: DISCONTINUED | OUTPATIENT
Start: 2018-12-29 | End: 2019-01-01

## 2018-12-29 RX ORDER — SODIUM CHLORIDE 9 MG/ML
999 INJECTION, SOLUTION INTRAVENOUS ONCE
Status: COMPLETED | OUTPATIENT
Start: 2018-12-29 | End: 2018-12-29

## 2018-12-29 RX ORDER — ACETAMINOPHEN 325 MG/1
650 TABLET ORAL
Status: DISCONTINUED | OUTPATIENT
Start: 2018-12-29 | End: 2018-12-29

## 2018-12-29 RX ORDER — ACETAMINOPHEN 325 MG/1
650 TABLET ORAL
Status: DISCONTINUED | OUTPATIENT
Start: 2018-12-29 | End: 2019-01-01 | Stop reason: HOSPADM

## 2018-12-29 RX ADMIN — Medication 20 ML: at 22:00

## 2018-12-29 RX ADMIN — HEPARIN SODIUM 5000 UNITS: 5000 INJECTION INTRAVENOUS; SUBCUTANEOUS at 21:53

## 2018-12-29 RX ADMIN — Medication 10 ML: at 15:00

## 2018-12-29 RX ADMIN — SODIUM CHLORIDE 999 ML/HR: 900 INJECTION, SOLUTION INTRAVENOUS at 04:49

## 2018-12-29 RX ADMIN — STANDARDIZED SENNA CONCENTRATE AND DOCUSATE SODIUM 1 TABLET: 8.6; 5 TABLET, FILM COATED ORAL at 09:50

## 2018-12-29 RX ADMIN — LEVOFLOXACIN 750 MG: 750 TABLET, FILM COATED ORAL at 19:33

## 2018-12-29 RX ADMIN — SODIUM CHLORIDE 75 ML/HR: 900 INJECTION, SOLUTION INTRAVENOUS at 06:00

## 2018-12-29 RX ADMIN — HYDROMORPHONE HYDROCHLORIDE 1 MG: 1 INJECTION, SOLUTION INTRAMUSCULAR; INTRAVENOUS; SUBCUTANEOUS at 14:33

## 2018-12-29 RX ADMIN — HEPARIN SODIUM 5000 UNITS: 5000 INJECTION INTRAVENOUS; SUBCUTANEOUS at 04:50

## 2018-12-29 RX ADMIN — Medication 10 ML: at 14:00

## 2018-12-29 RX ADMIN — CLOPIDOGREL BISULFATE 75 MG: 75 TABLET, FILM COATED ORAL at 09:50

## 2018-12-29 RX ADMIN — ACETAMINOPHEN 650 MG: 325 TABLET, FILM COATED ORAL at 23:44

## 2018-12-29 RX ADMIN — Medication 10 ML: at 06:00

## 2018-12-29 RX ADMIN — QUETIAPINE FUMARATE 300 MG: 300 TABLET, EXTENDED RELEASE ORAL at 21:52

## 2018-12-29 RX ADMIN — Medication 20 ML: at 15:00

## 2018-12-29 RX ADMIN — ATORVASTATIN CALCIUM 80 MG: 40 TABLET, FILM COATED ORAL at 09:00

## 2018-12-29 RX ADMIN — POTASSIUM CHLORIDE 40 MEQ: 20 TABLET, EXTENDED RELEASE ORAL at 09:00

## 2018-12-29 RX ADMIN — CEFTRIAXONE 1 G: 1 INJECTION, POWDER, FOR SOLUTION INTRAMUSCULAR; INTRAVENOUS at 19:33

## 2018-12-29 NOTE — PROGRESS NOTES
Progress Note Patient: Neri Calvillo               Sex: female          DOA: 12/20/2018 YOB: 1965      Age:  48 y.o.        LOS:  LOS: 9 days Subjective / Interval Hx I   
  
Patient post Left popliteal and tibial endarterectomy, patch angioplasty. left FEMORAL below knee to POPLITEAL BYPASS yesterday PM. No fever. C/o pain in the middle toe . On Levophed per Vascular surgery . EKG was noted to have EKG abnormality. Patient denies CP. 
 
12/23 : Episodes of hypotension noted , now resolved . On pressors Levophed. Continue to hold BP medications 12/24:Pt saying that she wants to go home 12/25:weaning off pressors;received blood transfusion 12/27:no complain Objective:  
  
Visit Vitals /77 Pulse 91 Temp 98.7 °F (37.1 °C) Resp 13 Ht 5' 5\" (1.651 m) Wt 76.2 kg (167 lb 15.9 oz) SpO2 98% BMI 27.96 kg/m² P/E 
NAD,sitting comfortably in the chair. Heent:perrla,at/nc,mouth moist. 
Lungs ctab Heart s1s2 nl,no m/g Abdm:soft,not tender,bs present. Extr:s/p surgery,staples in place. Palpable pedis pulse rt,noted with doppler in the left. Neuro:aaox3. Intake and Output: 
Current Shift:  No intake/output data recorded. Last three shifts:  12/27 1901 - 12/29 0700 In: 350 [P.O.:300; I.V.:50] Out: 1301 [Urine:1300] Recent Results (from the past 48 hour(s)) GLUCOSE, POC Collection Time: 12/27/18  3:25 PM  
Result Value Ref Range Glucose (POC) 121 (H) 70 - 110 mg/dL GLUCOSE, POC Collection Time: 12/27/18  9:30 PM  
Result Value Ref Range Glucose (POC) 98 70 - 110 mg/dL GLUCOSE, POC Collection Time: 12/28/18  4:58 AM  
Result Value Ref Range Glucose (POC) 91 70 - 110 mg/dL CBC WITH AUTOMATED DIFF Collection Time: 12/28/18  5:00 AM  
Result Value Ref Range WBC 6.8 4.6 - 13.2 K/uL  
 RBC 3.49 (L) 4.20 - 5.30 M/uL HGB 7.6 (L) 12.0 - 16.0 g/dL HCT 25.3 (L) 35.0 - 45.0 %  MCV 72.5 (L) 74.0 - 97.0 FL  
 MCH 21.8 (L) 24.0 - 34.0 PG  
 MCHC 30.0 (L) 31.0 - 37.0 g/dL  
 RDW 18.0 (H) 11.6 - 14.5 % PLATELET 034 243 - 546 K/uL MPV 11.3 9.2 - 11.8 FL  
 NEUTROPHILS 69 40 - 73 % LYMPHOCYTES 18 (L) 21 - 52 % MONOCYTES 9 3 - 10 % EOSINOPHILS 4 0 - 5 % BASOPHILS 0 0 - 2 %  
 ABS. NEUTROPHILS 4.7 1.8 - 8.0 K/UL  
 ABS. LYMPHOCYTES 1.3 0.9 - 3.6 K/UL  
 ABS. MONOCYTES 0.6 0.05 - 1.2 K/UL  
 ABS. EOSINOPHILS 0.3 0.0 - 0.4 K/UL  
 ABS. BASOPHILS 0.0 0.0 - 0.1 K/UL  
 DF AUTOMATED METABOLIC PANEL, COMPREHENSIVE Collection Time: 12/28/18  5:00 AM  
Result Value Ref Range Sodium 138 136 - 145 mmol/L Potassium 4.5 3.5 - 5.5 mmol/L Chloride 106 100 - 108 mmol/L  
 CO2 26 21 - 32 mmol/L Anion gap 6 3.0 - 18 mmol/L Glucose 86 74 - 99 mg/dL BUN 13 7.0 - 18 MG/DL Creatinine 0.50 (L) 0.6 - 1.3 MG/DL  
 BUN/Creatinine ratio 26 (H) 12 - 20 GFR est AA >60 >60 ml/min/1.73m2 GFR est non-AA >60 >60 ml/min/1.73m2 Calcium 7.9 (L) 8.5 - 10.1 MG/DL Bilirubin, total 0.3 0.2 - 1.0 MG/DL  
 ALT (SGPT) 51 13 - 56 U/L  
 AST (SGOT) 80 (H) 15 - 37 U/L Alk. phosphatase 78 45 - 117 U/L Protein, total 6.4 6.4 - 8.2 g/dL Albumin 2.2 (L) 3.4 - 5.0 g/dL Globulin 4.2 (H) 2.0 - 4.0 g/dL A-G Ratio 0.5 (L) 0.8 - 1.7 GLUCOSE, POC Collection Time: 12/28/18  8:25 AM  
Result Value Ref Range Glucose (POC) 82 70 - 110 mg/dL GLUCOSE, POC Collection Time: 12/28/18 11:29 AM  
Result Value Ref Range Glucose (POC) 83 70 - 110 mg/dL GLUCOSE, POC Collection Time: 12/28/18  3:44 PM  
Result Value Ref Range Glucose (POC) 118 (H) 70 - 110 mg/dL GLUCOSE, POC Collection Time: 12/28/18  8:42 PM  
Result Value Ref Range Glucose (POC) 112 (H) 70 - 110 mg/dL GLUCOSE, POC Collection Time: 12/28/18  9:17 PM  
Result Value Ref Range Glucose (POC) 109 70 - 110 mg/dL METABOLIC PANEL, BASIC Collection Time: 12/29/18 12:00 AM  
Result Value Ref Range Sodium 139 136 - 145 mmol/L Potassium 3.9 3.5 - 5.5 mmol/L Chloride 106 100 - 108 mmol/L  
 CO2 25 21 - 32 mmol/L Anion gap 8 3.0 - 18 mmol/L Glucose 99 74 - 99 mg/dL BUN 9 7.0 - 18 MG/DL Creatinine 0.64 0.6 - 1.3 MG/DL  
 BUN/Creatinine ratio 14 12 - 20 GFR est AA >60 >60 ml/min/1.73m2 GFR est non-AA >60 >60 ml/min/1.73m2 Calcium 7.7 (L) 8.5 - 10.1 MG/DL  
CBC W/O DIFF Collection Time: 12/29/18 12:00 AM  
Result Value Ref Range WBC 6.5 4.6 - 13.2 K/uL  
 RBC 3.46 (L) 4.20 - 5.30 M/uL HGB 7.9 (L) 12.0 - 16.0 g/dL HCT 25.0 (L) 35.0 - 45.0 % MCV 72.3 (L) 74.0 - 97.0 FL  
 MCH 22.8 (L) 24.0 - 34.0 PG  
 MCHC 31.6 31.0 - 37.0 g/dL  
 RDW 18.0 (H) 11.6 - 14.5 % PLATELET 376 616 - 769 K/uL MPV 10.6 9.2 - 11.8 FL  
CBC WITH AUTOMATED DIFF Collection Time: 12/29/18  5:00 AM  
Result Value Ref Range WBC 5.5 4.6 - 13.2 K/uL  
 RBC 3.35 (L) 4.20 - 5.30 M/uL HGB 7.5 (L) 12.0 - 16.0 g/dL HCT 24.3 (L) 35.0 - 45.0 % MCV 72.5 (L) 74.0 - 97.0 FL  
 MCH 22.4 (L) 24.0 - 34.0 PG  
 MCHC 30.9 (L) 31.0 - 37.0 g/dL  
 RDW 18.2 (H) 11.6 - 14.5 % PLATELET 030 876 - 273 K/uL MPV 11.0 9.2 - 11.8 FL  
 NEUTROPHILS 58 40 - 73 % LYMPHOCYTES 27 21 - 52 % MONOCYTES 11 (H) 3 - 10 % EOSINOPHILS 4 0 - 5 % BASOPHILS 0 0 - 2 %  
 ABS. NEUTROPHILS 3.2 1.8 - 8.0 K/UL  
 ABS. LYMPHOCYTES 1.5 0.9 - 3.6 K/UL  
 ABS. MONOCYTES 0.6 0.05 - 1.2 K/UL  
 ABS. EOSINOPHILS 0.2 0.0 - 0.4 K/UL  
 ABS. BASOPHILS 0.0 0.0 - 0.1 K/UL  
 DF AUTOMATED    
GLUCOSE, POC Collection Time: 12/29/18  6:41 AM  
Result Value Ref Range Glucose (POC) 97 70 - 110 mg/dL TYPE + CROSSMATCH Collection Time: 12/29/18  8:00 AM  
Result Value Ref Range Crossmatch Expiration 01/01/2019 ABO/Rh(D) B POSITIVE Antibody screen NEG   
 CALLED TO:    
   Capri Jimenez, 2200, AT 0939 ON 12/29/2018 BY NAH  
CULTURE, BLOOD Collection Time: 12/29/18  9:40 AM  
Result Value Ref Range Special Requests: PERIPHERAL Culture result: PENDING   
GLUCOSE, POC Collection Time: 12/29/18 11:51 AM  
Result Value Ref Range Glucose (POC) 114 (H) 70 - 110 mg/dL Lab/Data Reviewed: All lab results for the last 24 hours reviewed. XRays were reviewed in past 24 hours Medications Reviewed Assessment/Plan Active Problems: 
  Atherosclerotic PVD with intermittent claudication ([address] (12/20/2018) Hypokalemia (12/20/2018) CAD (coronary artery disease) (12/20/2018) DM (diabetes mellitus) ([facility] (12/20/2018) HTN (hypertension) (12/20/2018) Depression (12/20/2018) Current smoker (12/20/2018) Anemia (12/22/2018) Hypotension Hypernatremia Rhabdomyolysis CARE PLAN: 
  
PVD with Claudication  
- Initially on Heparin drip - pain control as needed - s/p Left popliteal and tibial endarterectomy, patch angioplasty. left FEMORAL below knee to POPLITEAL BYPASS 
- Patient on Levophed to keep SBP > 100 per vascular surgery - Vascular Surgery on board - On levaquin,ceftriaxone - Off pressors now Hypotension 
-Will order blood transfusion since Hgb 7.5 and patient is hypotensive. 
  
CAD 
- Plavix - Losartan  
- aspirin - Lipitor  
  
DM 
- SSI  
- A1c 
  
HTN 
- hold  losartan , Norvasc  due to hypotension 
  
Depression - Wellbutrin , Seroquel, clonepin  
  
Smoker  
- advise cessation  
- Nicotine Patch  
  
Hypokalemia  
- replace  
- resolved Hypernatremia  
- resolved Rhabdomyolysis - resolved Anemia  
- microcytic  
- received blood transfusion yesterday and Hgb now 8.7 Cough - cxr 12/22 c/w right greater than left lower lobe airspace disease or atelectasis 
- lasix 20 mg IV X 1 was given on that day before cxr results known 
- albuterol as needed  
  
DM Neuropathy 
- Neurontin  
  
DVT prophylaxis Full code Disposition:1-2 days Aashish Mcginnis MD 
December 29, 2018

## 2018-12-29 NOTE — PROGRESS NOTES
[facility] 
[address]. [place], [address] Winnie Ordonez, Dr. Jaclyn Kennedy , Dr. Morteza Whiting [phone] FAX# [phone] PROGRESS NOTE Patient: Fredis Blackmon MRN: [id number]  SSN: [id number] YOB: 1965  Age: 48 y.o. Sex: female Date: 12/29/2018 Hospital: [facility] Post-Op Day: 7 Plan:  
1 PRBC's today. No signs of bleeding or hematoma on b/l LE wounds. Repeat blood cultures. Assessment:  
Critical limb ischemia s/p LLE bypass with continued hypotension. AMI R/O B cx negative. Unsure about sepsis since no clear source. If hypotension and tachycardia continues may consider either ct to fin etiology vs removal and replacement of bypass. Subjective:  
Decrease appetite and feeling tired. Not required Objective:  
Admit weight: Weight: 74.8 kg (165 lb) Last recorded weight: Weight: 76.2 kg (167 lb 15.9 oz) Visit Vitals BP 98/59 Pulse 78 Temp 98.1 °F (36.7 °C) Resp 13 Ht 5' 5\" (1.651 m) Wt 76.2 kg (167 lb 15.9 oz) SpO2 100% BMI 27.96 kg/m² Intake/Output Summary (Last 24 hours) at 12/29/2018 8368 Last data filed at 12/29/2018 0634 Gross per 24 hour Intake 120 ml Output 1301 ml Net -1181 ml Physical exam was negative except for: Bilateral leg incisions clean and dry Palpable IVETH pulse. Incision clean without hematoma or erythema. Labs:  
 
Recent Labs  
  12/29/18 
0500 12/29/18 
0000 12/28/18 
0500 WBC 5.5 6.5 6.8 HGB 7.5* 7.9* 7.6* HCT 24.3* 25.0* 25.3*  
 285 295 RDW 18.2* 18.0* 18.0* Recent Labs  
  12/29/18 
0000 12/28/18 
0500 12/27/18 
0549  138 137  
K 3.9 4.5 4.1  106 103 CO2 25 26 25 GLU 99 86 128* BUN 9 13 11 CREA 0.64 0.50* 0.58* CA 7.7* 7.9* 7.9* ALB  --  2.2* 2.3*  
SGOT  --  80* 69* ALT  --  51 41 No results for input(s): PH, PCO2, PO2, HCO3, FIO2 in the last 72 hours.  
 
 
Regina Byrne MD, FACS

## 2018-12-29 NOTE — PROGRESS NOTES
Pt on PT hold today; pt tachy with low BP and diaphoretic. Will resume with POC as appropriate.   Radha Mail, PTA

## 2018-12-29 NOTE — PROGRESS NOTES
Bedside shift change report given to Sukh Gutierrez RN (oncoming nurse) by MARTIN Barone (offgoing nurse). Report included the following information SBAR, Kardex and MAR.  
 
2020: During rounds patient found to be diaphoretic and complaining of SOB. Patient put on 2L of O2 NC. Dr. Paul See paged. 2059: Dr. Bethel Jacob authorized orders for a stat chest x ray and one time new treatment. Nursing supervisor, CCL, and ICU Charge RN all present and assessed patient. 0000: Dr. Paul See paged because patient is still diaphoretic and complaining of SOB. Patient is tachycardic and hypotensive. This RN requested that Dr. Paul See come see the patient and provide further guidance. 0015: Dr. Paul See to patients bedside. NS at 75 ordered as well as CBC and BMP. Will continue to monitor. 0430: Dr. Paul See paged, patient was hypotensive. 250ml of NS bolus was ordered and administered. Will continue to monitor. 0630: Dr. Paul See paged because patient remains hypotensive after received 250 ml NS bolus. Patient 0500 lab are also showing a slight trend down. Dr. Paul See made aware and wants to make no changes to the current orders. Will continue to monitor the patient. Bedside shift change report given to MARTIN Barone (oncoming nurse) by Sukh Gutierrez RN (offgoing nurse). Report included the following information SBAR, Kardex and MAR.

## 2018-12-29 NOTE — PROGRESS NOTES
Problem: Falls - Risk of 
Goal: *Absence of Falls Document Bethel Officer Fall Risk and appropriate interventions in the flowsheet. Outcome: Progressing Towards Goal 
Fall Risk Interventions: 
Mobility Interventions: Patient to call before getting OOB Medication Interventions: Patient to call before getting OOB Elimination Interventions: Call light in reach Problem: Pressure Injury - Risk of 
Goal: *Prevention of pressure injury Document Carlo Scale and appropriate interventions in the flowsheet. Outcome: Progressing Towards Goal 
Pressure Injury Interventions: 
Sensory Interventions: Assess changes in LOC Moisture Interventions: Absorbent underpads Activity Interventions: Increase time out of bed Mobility Interventions: HOB 30 degrees or less Nutrition Interventions: Document food/fluid/supplement intake Friction and Shear Interventions: HOB 30 degrees or less

## 2018-12-29 NOTE — PROGRESS NOTES
Received report from night nurse ,Michael Gutierrez. Patient is alert and responsive, but appears very weak. Blood pressure has  been running low and heart rate has been running fast.Patient received a theral work up during the night and orders were received and carried. Will continue to monitor condition closely.

## 2018-12-29 NOTE — ROUTINE PROCESS
Called to pt's room by nursing supervisor. Pt complaining of SOB and \"not feeling right\". This RN at bedside to assess patient. Dr Hettie Sandhoff notified and to come assess pt. Pt receiving prn breathing treatment. Pt O2 sats 100%. Chest xray pending. Primary nurse instructed to call for anything further.

## 2018-12-29 NOTE — PROGRESS NOTES
Nurse paged - pt is diaphoretic, tachycardic , was SOB - which improved with a Neb rx Pt seen & evaluated - labs from this Am shows Anemia - Hgb 7.6 - was 11.7 on admission -- will repeat stat labs Stat CXR ordered Pt is also tachycardic - poor Po intake, hypotensive - will start IVF - NS @75ml/hr Will continue to monitor

## 2018-12-30 LAB
ABO + RH BLD: NORMAL
ALBUMIN SERPL-MCNC: 2.2 G/DL (ref 3.4–5)
ALBUMIN/GLOB SERPL: 0.5 {RATIO} (ref 0.8–1.7)
ALP SERPL-CCNC: 92 U/L (ref 45–117)
ALT SERPL-CCNC: 51 U/L (ref 13–56)
ANION GAP SERPL CALC-SCNC: 6 MMOL/L (ref 3–18)
AST SERPL-CCNC: 73 U/L (ref 15–37)
BASOPHILS # BLD: 0 K/UL (ref 0–0.1)
BASOPHILS NFR BLD: 0 % (ref 0–2)
BILIRUB SERPL-MCNC: 0.6 MG/DL (ref 0.2–1)
BLD PROD TYP BPU: NORMAL
BLOOD GROUP ANTIBODIES SERPL: NORMAL
BPU ID: NORMAL
BUN SERPL-MCNC: 8 MG/DL (ref 7–18)
BUN/CREAT SERPL: 15 (ref 12–20)
CALCIUM SERPL-MCNC: 7.6 MG/DL (ref 8.5–10.1)
CALLED TO:,BCALL1: NORMAL
CHLORIDE SERPL-SCNC: 107 MMOL/L (ref 100–108)
CO2 SERPL-SCNC: 25 MMOL/L (ref 21–32)
CREAT SERPL-MCNC: 0.54 MG/DL (ref 0.6–1.3)
CROSSMATCH RESULT,%XM: NORMAL
DIFFERENTIAL METHOD BLD: ABNORMAL
EOSINOPHIL # BLD: 0.2 K/UL (ref 0–0.4)
EOSINOPHIL NFR BLD: 3 % (ref 0–5)
ERYTHROCYTE [DISTWIDTH] IN BLOOD BY AUTOMATED COUNT: 20.2 % (ref 11.6–14.5)
GLOBULIN SER CALC-MCNC: 4.6 G/DL (ref 2–4)
GLUCOSE BLD STRIP.AUTO-MCNC: 102 MG/DL (ref 70–110)
GLUCOSE BLD STRIP.AUTO-MCNC: 103 MG/DL (ref 70–110)
GLUCOSE BLD STRIP.AUTO-MCNC: 112 MG/DL (ref 70–110)
GLUCOSE BLD STRIP.AUTO-MCNC: 128 MG/DL (ref 70–110)
GLUCOSE SERPL-MCNC: 94 MG/DL (ref 74–99)
HCT VFR BLD AUTO: 27.9 % (ref 35–45)
HGB BLD-MCNC: 8.9 G/DL (ref 12–16)
LYMPHOCYTES # BLD: 1.4 K/UL (ref 0.9–3.6)
LYMPHOCYTES NFR BLD: 19 % (ref 21–52)
MCH RBC QN AUTO: 24.1 PG (ref 24–34)
MCHC RBC AUTO-ENTMCNC: 31.9 G/DL (ref 31–37)
MCV RBC AUTO: 75.6 FL (ref 74–97)
MONOCYTES # BLD: 0.6 K/UL (ref 0.05–1.2)
MONOCYTES NFR BLD: 8 % (ref 3–10)
NEUTS SEG # BLD: 5.3 K/UL (ref 1.8–8)
NEUTS SEG NFR BLD: 70 % (ref 40–73)
PLATELET # BLD AUTO: 284 K/UL (ref 135–420)
PMV BLD AUTO: 10.9 FL (ref 9.2–11.8)
POTASSIUM SERPL-SCNC: 3.9 MMOL/L (ref 3.5–5.5)
PROT SERPL-MCNC: 6.8 G/DL (ref 6.4–8.2)
RBC # BLD AUTO: 3.69 M/UL (ref 4.2–5.3)
SODIUM SERPL-SCNC: 138 MMOL/L (ref 136–145)
SPECIMEN EXP DATE BLD: NORMAL
STATUS OF UNIT,%ST: NORMAL
UNIT DIVISION, %UDIV: 0
WBC # BLD AUTO: 7.6 K/UL (ref 4.6–13.2)

## 2018-12-30 PROCEDURE — 85025 COMPLETE CBC W/AUTO DIFF WBC: CPT

## 2018-12-30 PROCEDURE — 74011250637 HC RX REV CODE- 250/637: Performed by: INTERNAL MEDICINE

## 2018-12-30 PROCEDURE — 74011250637 HC RX REV CODE- 250/637: Performed by: FAMILY MEDICINE

## 2018-12-30 PROCEDURE — 97530 THERAPEUTIC ACTIVITIES: CPT

## 2018-12-30 PROCEDURE — P9045 ALBUMIN (HUMAN), 5%, 250 ML: HCPCS | Performed by: INTERNAL MEDICINE

## 2018-12-30 PROCEDURE — 36430 TRANSFUSION BLD/BLD COMPNT: CPT

## 2018-12-30 PROCEDURE — 74011250636 HC RX REV CODE- 250/636: Performed by: INTERNAL MEDICINE

## 2018-12-30 PROCEDURE — 82962 GLUCOSE BLOOD TEST: CPT

## 2018-12-30 PROCEDURE — 97110 THERAPEUTIC EXERCISES: CPT

## 2018-12-30 PROCEDURE — 65660000000 HC RM CCU STEPDOWN

## 2018-12-30 PROCEDURE — 74011250636 HC RX REV CODE- 250/636: Performed by: NURSE PRACTITIONER

## 2018-12-30 PROCEDURE — 80053 COMPREHEN METABOLIC PANEL: CPT

## 2018-12-30 RX ORDER — ALBUMIN HUMAN 50 G/1000ML
25 SOLUTION INTRAVENOUS ONCE
Status: COMPLETED | OUTPATIENT
Start: 2018-12-30 | End: 2018-12-30

## 2018-12-30 RX ORDER — BUPROPION HYDROCHLORIDE 100 MG/1
100 TABLET, EXTENDED RELEASE ORAL DAILY
Status: DISCONTINUED | OUTPATIENT
Start: 2018-12-31 | End: 2019-01-01 | Stop reason: HOSPADM

## 2018-12-30 RX ADMIN — Medication 10 ML: at 06:00

## 2018-12-30 RX ADMIN — QUETIAPINE FUMARATE 300 MG: 300 TABLET, EXTENDED RELEASE ORAL at 21:24

## 2018-12-30 RX ADMIN — LEVOFLOXACIN 750 MG: 750 TABLET, FILM COATED ORAL at 18:19

## 2018-12-30 RX ADMIN — POTASSIUM CHLORIDE 40 MEQ: 20 TABLET, EXTENDED RELEASE ORAL at 10:58

## 2018-12-30 RX ADMIN — Medication 20 ML: at 21:24

## 2018-12-30 RX ADMIN — Medication 20 ML: at 15:51

## 2018-12-30 RX ADMIN — CLOPIDOGREL BISULFATE 75 MG: 75 TABLET, FILM COATED ORAL at 10:58

## 2018-12-30 RX ADMIN — ACETAMINOPHEN 650 MG: 325 TABLET, FILM COATED ORAL at 21:24

## 2018-12-30 RX ADMIN — Medication 10 ML: at 15:51

## 2018-12-30 RX ADMIN — HEPARIN SODIUM 5000 UNITS: 5000 INJECTION INTRAVENOUS; SUBCUTANEOUS at 15:49

## 2018-12-30 RX ADMIN — ATORVASTATIN CALCIUM 80 MG: 40 TABLET, FILM COATED ORAL at 10:58

## 2018-12-30 RX ADMIN — HEPARIN SODIUM 5000 UNITS: 5000 INJECTION INTRAVENOUS; SUBCUTANEOUS at 21:24

## 2018-12-30 RX ADMIN — ALBUMIN (HUMAN) 25 G: 12.5 INJECTION, SOLUTION INTRAVENOUS at 16:41

## 2018-12-30 RX ADMIN — HEPARIN SODIUM 5000 UNITS: 5000 INJECTION INTRAVENOUS; SUBCUTANEOUS at 05:35

## 2018-12-30 NOTE — PROGRESS NOTES
Problem: Falls - Risk of 
Goal: *Absence of Falls Document Frantz Dye Fall Risk and appropriate interventions in the flowsheet. Outcome: Progressing Towards Goal 
Fall Risk Interventions: 
Mobility Interventions: Patient to call before getting OOB Medication Interventions: Patient to call before getting OOB Elimination Interventions: Call light in reach Problem: Pressure Injury - Risk of 
Goal: *Prevention of pressure injury Document Carlo Scale and appropriate interventions in the flowsheet. Outcome: Progressing Towards Goal 
Pressure Injury Interventions: 
Sensory Interventions: Assess changes in LOC Moisture Interventions: Absorbent underpads Activity Interventions: Increase time out of bed Mobility Interventions: HOB 30 degrees or less Nutrition Interventions: Document food/fluid/supplement intake Friction and Shear Interventions: HOB 30 degrees or less

## 2018-12-30 NOTE — PROGRESS NOTES
Vascular Surgery Progress Note Admit Date: 2018 POD 9 Days Post-Op Procedure/Surgery:  Procedure(s): 
left FEMORAL below knee to POPLITEAL BYPASS Subjective: Pt awake some surgical site discomfort but ambulating in room. Fever last night,  Pt reports continued productive cough. Continued L toe numbness. Post operatively on pressors for several days and received PRBC's  Now stable on surgical floor. Objective:  
 
Visit Vitals /77 (BP 1 Location: Right arm, BP Patient Position: At rest) Pulse 91 Temp 98.8 °F (37.1 °C) Resp 16 Ht 5' 5\" (1.651 m) Wt 78.7 kg (173 lb 8 oz) SpO2 100% BMI 28.87 kg/m² Temp (24hrs), Av.4 °F (37.4 °C), Min:97.7 °F (36.5 °C), Max:102.5 °F (39.2 °C) Physical Exam: 
GEN: A&Ox3, NAD, comfortable. HEENT:PERRL EOMI, non icteric, moist membranes. NECK: no JVD, supple LUNG: coarse breath sounds minimal clearing with cough. Unlabored breathing. HEART: regular ABD: soft, NT 
EXT: R upper thigh and L thigh/knee incisions all intact/supple, no erythema or drainage. Relatively nontender. Minimal edema. Softly palp L PT. Pulses? . L foot warm perfused. Labs:  
Recent Results (from the past 24 hour(s)) GLUCOSE, POC Collection Time: 18  5:13 PM  
Result Value Ref Range Glucose (POC) 137 (H) 70 - 110 mg/dL GLUCOSE, POC Collection Time: 18  8:54 PM  
Result Value Ref Range Glucose (POC) 116 (H) 70 - 110 mg/dL CBC WITH AUTOMATED DIFF Collection Time: 18  4:10 AM  
Result Value Ref Range WBC 7.6 4.6 - 13.2 K/uL  
 RBC 3.69 (L) 4.20 - 5.30 M/uL HGB 8.9 (L) 12.0 - 16.0 g/dL HCT 27.9 (L) 35.0 - 45.0 % MCV 75.6 74.0 - 97.0 FL  
 MCH 24.1 24.0 - 34.0 PG  
 MCHC 31.9 31.0 - 37.0 g/dL RDW 20.2 (H) 11.6 - 14.5 % PLATELET 789 452 - 402 K/uL MPV 10.9 9.2 - 11.8 FL  
 NEUTROPHILS 70 40 - 73 % LYMPHOCYTES 19 (L) 21 - 52 % MONOCYTES 8 3 - 10 % EOSINOPHILS 3 0 - 5 % BASOPHILS 0 0 - 2 %  
 ABS. NEUTROPHILS 5.3 1.8 - 8.0 K/UL  
 ABS. LYMPHOCYTES 1.4 0.9 - 3.6 K/UL  
 ABS. MONOCYTES 0.6 0.05 - 1.2 K/UL  
 ABS. EOSINOPHILS 0.2 0.0 - 0.4 K/UL  
 ABS. BASOPHILS 0.0 0.0 - 0.1 K/UL  
 DF AUTOMATED METABOLIC PANEL, COMPREHENSIVE Collection Time: 12/30/18  4:10 AM  
Result Value Ref Range Sodium 138 136 - 145 mmol/L Potassium 3.9 3.5 - 5.5 mmol/L Chloride 107 100 - 108 mmol/L  
 CO2 25 21 - 32 mmol/L Anion gap 6 3.0 - 18 mmol/L Glucose 94 74 - 99 mg/dL BUN 8 7.0 - 18 MG/DL Creatinine 0.54 (L) 0.6 - 1.3 MG/DL  
 BUN/Creatinine ratio 15 12 - 20 GFR est AA >60 >60 ml/min/1.73m2 GFR est non-AA >60 >60 ml/min/1.73m2 Calcium 7.6 (L) 8.5 - 10.1 MG/DL Bilirubin, total 0.6 0.2 - 1.0 MG/DL  
 ALT (SGPT) 51 13 - 56 U/L  
 AST (SGOT) 73 (H) 15 - 37 U/L Alk. phosphatase 92 45 - 117 U/L Protein, total 6.8 6.4 - 8.2 g/dL Albumin 2.2 (L) 3.4 - 5.0 g/dL Globulin 4.6 (H) 2.0 - 4.0 g/dL A-G Ratio 0.5 (L) 0.8 - 1.7 GLUCOSE, POC Collection Time: 12/30/18  5:34 AM  
Result Value Ref Range Glucose (POC) 102 70 - 110 mg/dL GLUCOSE, POC Collection Time: 12/30/18 12:20 PM  
Result Value Ref Range Glucose (POC) 128 (H) 70 - 110 mg/dL Assessment:  
 
Active Problems: 
  Atherosclerotic PVD with intermittent claudication ([facility] (12/20/2018) Hypokalemia (12/20/2018) CAD (coronary artery disease) (12/20/2018) DM (diabetes mellitus) ([facility] (12/20/2018) HTN (hypertension) (12/20/2018) Depression (12/20/2018) Current smoker (12/20/2018) Anemia (12/22/2018) POD#9 L pop/TPT endarterectomy with L fem BK pop vein bypass (using R upper thigh GSV) - composite bypass. - clinically patent. Fever - last pm - continued cough? Bronchitis? Surgical sites do not appear infected. Monitor for now. Plan/Recommendations/Medical Decision Making: Continue rocephen, needs ICS hourly, ambulate frequently with assistance, Physical therapy. Continue plavix daily. Sundar Yancey. [facility], [facility] cell - [phone] December 30, 2018 
2:37 PM

## 2018-12-30 NOTE — PROGRESS NOTES
1914:  Bedside report received from MARTIN Freedman. Patient in bed watching television and eating soft mints. Patient reports pain 2/10 due to getting up to use the restroom. Call bell within reach. Bed low, wheels locked. Will continue to monitor. 2323:  Dr. Aruna Thmopson paged regarding patients current vital signs. 2332:  Dr. Manan Lucia paged regarding patients current vitals and permission to start the unit of blood. 2334:  Dr. Manan Lucia returned page. Dr. Manan Lucia given patient current vitals. Trend reviewed by Dr. Manan Lucia. This RN was given permission to proceed with administering the unit of PRBCs. 
 
0308:  1 unit PRBC completed. Vitals have improved. Patient sleepin. No signs of distress. Bedside shift change report given to Amil Gottron, RN (oncoming nurse) by Rosalinda Gotti (offgoing nurse). Report included the following information SBAR, Kardex, Procedure Summary, MAR, Recent Results and Med Rec Status.

## 2018-12-30 NOTE — PROGRESS NOTES
Problem: Mobility Impaired (Adult and Pediatric) Goal: *Acute Goals and Plan of Care (Insert Text) Physical Therapy Goals Initiated 12/27/2018 and to be accomplished within 7 days. 1.  Patient will complete all bed mobility with supervision/set-up in order to prepare for EOB/OOB activity. 2.  Patient will perform sit <> stand with minimal assistance/contact guard assist in order to prepare for OOB/gait activity. 3.  Patient will perform bed to chair transfers with minimal assistance/contact guard assist in order to promote mobility and encourage seated activity to progress towards their prior level of function. 4.  Patient will ambulate 15 feet with minimal assistance/contact guard assist using LRAD in order to prepare for safe negotiation of their environment. Outcome: Progressing Towards Goal 
PHYSICAL THERAPY: Daily TREATMENT Note INPATIENT: Medicare: Hospital Day: 11 Patient: Veronika Lance (46 y.o. female)    Date: 12/30/2018 Primary Diagnosis: Atherosclerotic PVD with intermittent claudication (HCC) Procedure(s) (LRB): 
left FEMORAL below knee to POPLITEAL BYPASS (Right), 9 Days Post-Op, Precautions: (None) Chart, physical therapy assessment, plan of care and goals were reviewed. PLOF: amb without AD, independent ASSESSMENT: 
Pt with progress today. Supervision for bed mobility. SBA for sit<>std. Pt with decrease LLE WBing with transfers, improved with VCs. Pain in LLE with movment, and Foot pain to touch. AAROM for DF on LLE. CGA for amb x 20' with RW. Note pt unable to perform step through gait at this time due to pain with WBIng on LLE. Progression toward goals: 
      Improving appropriately and progressing toward goals(X) Improving slowly and progressing toward goals Not making progress toward goals and plan of care will be adjusted PLAN: 
Patient continues to benefit from skilled intervention to address the above impairments. Continue treatment per established plan of care. EDUCATION:  
Education:  Patient was educated on the following topics: Pt ed on importance + benefits to perform bed mobility and exercises every 1-2 hours to increase/maintain LE/core strength to promote functional mobility, pt verbalizes understanding. Discharge Recommendations:  Home Health Further Equipment Recommendations for Discharge:  rolling walker Factors which may impact discharge planning: none SUBJECTIVE:  
Patient stated I can not feel this left foot when I am walking but I will try my best to make it to the door.  OBJECTIVE DATA SUMMARY:  
Critical Behavior: 
Neurologic State: Alert, Appropriate for age, Eyes open spontaneously Orientation Level: Oriented X4 Cognition: Appropriate decision making, Appropriate safety awareness, Appropriate for age attention/concentration, Follows commands [address] Standing Balance Scale 
0: Pt performs 25% or less of standing activity (Max assist) CN, 100% impaired. 1: Pt supports self with upper extremities but requires therapist assistance. Pt performs 25-50% of effort (Mod assist) CM, 80% to <100% impaired. 1+: Pt supports self with upper extremities but requires therapist assistance. Pt performs >50% effort. (Min assist). CL, 60% to <80% impaired. 2: Pt supports self independently with both upper extremities (walker, crutches, parallel bars). CL, 60% to <80% impaired. 2+: Pt support self independently with 1 upper extremity (cane, crutch, 1 parallel bar). CK, 40% to <60% impaired. 3: Pt stands without upper extremity support for up to 30 seconds. CK, 40% to <60% impaired. 3+: Pt stands without upper extremity support for 30 seconds or greater. CJ, 20% to <40% impaired. 4: Pt independently moves and returns center of gravity 1-2 inches in one plane. CJ, 20% to <40% impaired.  
4+: Pt independently moves and returns center of gravity 1-2 inches in multiple planes. CI, 1% to <20% impaired. 5: Pt independently moves and returns center of gravity in all planes greater than 2 inches. CH, 0% impaired. Functional Mobility: 
 
 
Functional Status Indep (I) Mod I Super-vision Min A Mod A Max A Total A Assist x2 Verbal cues Additional time Not tested Comments Rolling []  []  [x] []    []    []  []  [] [] [] [] Supine to sit []  []  [x] []  []  []  []  [] [] [] [] Sit to supine []  []  [x] []  []  []  []  [] [] [] [] Sit to stand []  []  [x]SBA []  []  []  []  [] [] [] []   
Stand to sit []  []  [x] []  []  []  []  [] [] [] [] Bed to chair transfers []  []  [] []  []  []  []  [] [] [] [] Balance Good Willadean Adrien Poor Unable Not tested Comments Sitting static [x]  []  []  []  [] Sitting dynamic [x]  []  []  []  []   
Standing static []  [x]  []  []  []   
Standing dynamic []  []  []  []  [] Mobility/Gait:  
Level of Assistance: Contact guard assistance Assistive Device: rolling walker Distance Ambulated: 20 feet Left Lower Extremity: FBAT Right Lower Extremity: FBAT Base of Support: shift to right Speed/Kym: pace decreased (<100 feet/min) and slow Step Length: left shortened and right shortened Swing Pattern: left asymmetrical and right asymmetrical 
Stance: left decreased and right decreased Gait Abnormalities: antalgic, decreased step clearance and step to gait Therapeutic Exercises:  
 
 
 
EXERCISE Sets Reps Active Active Assist  
Passive Self ROM Comments Ankle Pumps 1 10  [x]right left [] [] Quad Sets/Glut Sets 1 10 [x] [] [] [] Hamstring Sets   [] [] [] [] Short Arc Quads   [] [] [] [] Heel Slides   [] [] [] [] Straight Leg Raises 1 10 [x] [] [] [] Hip Abd/Add   [] [] [] [] Long Arc Quads   [] [] [] [] Seated Marching   [] [] [] []   
Standing Marching   [] [] [] []   
sit<>std 
inversion/eversion 
lateral/fwd weight shifting 1 
1 
1 5 
20 10 ea [x] [] [] []   
 
 
Vital Signs Temp: 98.8 °F (37.1 °C) Pulse (Heart Rate): 91    
BP: 111/77 Resp Rate: 16    
O2 Sat (%): 100 %Pain:3-4/10 Pre treatment pain level:3-4/10 Post treatment pain level:3-/10 Activity Tolerance:  
good After treatment:  
Patient left in no apparent distress sitting up in chair Patient left in no apparent distress in bed(X) Call bell left within reach(X) Nursing notified(X) Caregiver present Bed alarm activated Rupinder Sunshine PTA Time Calculation: 40 mins

## 2018-12-30 NOTE — PROGRESS NOTES
Progress Note Patient: Brittney Jones               Sex: female          DOA: 12/20/2018 YOB: 1965      Age:  48 y.o.        LOS:  LOS: 10 days Subjective / Interval Hx I   
  
Patient post Left popliteal and tibial endarterectomy, patch angioplasty. left FEMORAL below knee to POPLITEAL BYPASS yesterday PM. No fever. C/o pain in the middle toe . On Levophed per Vascular surgery . EKG was noted to have EKG abnormality. Patient denies CP. 
 
12/23 : Episodes of hypotension noted , now resolved . On pressors Levophed. Continue to hold BP medications 12/24:Pt saying that she wants to go home 12/25:weaning off pressors;received blood transfusion 12/27:no complaint Objective:  
  
Visit Vitals /77 (BP 1 Location: Right arm, BP Patient Position: At rest) Pulse 91 Temp 98.8 °F (37.1 °C) Resp 16 Ht 5' 5\" (1.651 m) Wt 78.7 kg (173 lb 8 oz) SpO2 100% BMI 28.87 kg/m² P/E 
NAD,sitting comfortably in the chair. Heent:perrla,at/nc,mouth moist. 
Lungs ctab Heart s1s2 nl,no m/g Abdm:soft,not tender,bs present. Extr:s/p surgery,staples in place. Palpable pedis pulse rt,noted with doppler in the left. Neuro:aaox3. Intake and Output: 
Current Shift:  No intake/output data recorded. Last three shifts:  12/28 1901 - 12/30 0700 In: 550 [P.O.:50] Out: 1402 [[id number]] Recent Results (from the past 48 hour(s)) GLUCOSE, POC Collection Time: 12/28/18  3:44 PM  
Result Value Ref Range Glucose (POC) 118 (H) 70 - 110 mg/dL GLUCOSE, POC Collection Time: 12/28/18  8:42 PM  
Result Value Ref Range Glucose (POC) 112 (H) 70 - 110 mg/dL GLUCOSE, POC Collection Time: 12/28/18  9:17 PM  
Result Value Ref Range Glucose (POC) 109 70 - 110 mg/dL METABOLIC PANEL, BASIC Collection Time: 12/29/18 12:00 AM  
Result Value Ref Range Sodium 139 136 - 145 mmol/L Potassium 3.9 3.5 - 5.5 mmol/L  Chloride 106 100 - 108 mmol/L  
 CO2 25 21 - 32 mmol/L Anion gap 8 3.0 - 18 mmol/L Glucose 99 74 - 99 mg/dL BUN 9 7.0 - 18 MG/DL Creatinine 0.64 0.6 - 1.3 MG/DL  
 BUN/Creatinine ratio 14 12 - 20 GFR est AA >60 >60 ml/min/1.73m2 GFR est non-AA >60 >60 ml/min/1.73m2 Calcium 7.7 (L) 8.5 - 10.1 MG/DL  
CBC W/O DIFF Collection Time: 12/29/18 12:00 AM  
Result Value Ref Range WBC 6.5 4.6 - 13.2 K/uL  
 RBC 3.46 (L) 4.20 - 5.30 M/uL HGB 7.9 (L) 12.0 - 16.0 g/dL HCT 25.0 (L) 35.0 - 45.0 % MCV 72.3 (L) 74.0 - 97.0 FL  
 MCH 22.8 (L) 24.0 - 34.0 PG  
 MCHC 31.6 31.0 - 37.0 g/dL  
 RDW 18.0 (H) 11.6 - 14.5 % PLATELET 068 395 - 883 K/uL MPV 10.6 9.2 - 11.8 FL  
CBC WITH AUTOMATED DIFF Collection Time: 12/29/18  5:00 AM  
Result Value Ref Range WBC 5.5 4.6 - 13.2 K/uL  
 RBC 3.35 (L) 4.20 - 5.30 M/uL HGB 7.5 (L) 12.0 - 16.0 g/dL HCT 24.3 (L) 35.0 - 45.0 % MCV 72.5 (L) 74.0 - 97.0 FL  
 MCH 22.4 (L) 24.0 - 34.0 PG  
 MCHC 30.9 (L) 31.0 - 37.0 g/dL  
 RDW 18.2 (H) 11.6 - 14.5 % PLATELET 722 585 - 462 K/uL MPV 11.0 9.2 - 11.8 FL  
 NEUTROPHILS 58 40 - 73 % LYMPHOCYTES 27 21 - 52 % MONOCYTES 11 (H) 3 - 10 % EOSINOPHILS 4 0 - 5 % BASOPHILS 0 0 - 2 %  
 ABS. NEUTROPHILS 3.2 1.8 - 8.0 K/UL  
 ABS. LYMPHOCYTES 1.5 0.9 - 3.6 K/UL  
 ABS. MONOCYTES 0.6 0.05 - 1.2 K/UL  
 ABS. EOSINOPHILS 0.2 0.0 - 0.4 K/UL  
 ABS. BASOPHILS 0.0 0.0 - 0.1 K/UL  
 DF AUTOMATED    
GLUCOSE, POC Collection Time: 12/29/18  6:41 AM  
Result Value Ref Range Glucose (POC) 97 70 - 110 mg/dL TYPE + CROSSMATCH Collection Time: 12/29/18  8:00 AM  
Result Value Ref Range Crossmatch Expiration 01/01/2019 ABO/Rh(D) B POSITIVE Antibody screen NEG   
 CALLED TO:    
   Anne Marie Reese, 2200, AT 0939 ON 12/29/2018 BY [facility] Unit number [id number] Blood component type RC LR Unit division 00 Status of unit TRANSFUSED Crossmatch result Compatible CULTURE, BLOOD Collection Time: 12/29/18  9:40 AM  
Result Value Ref Range Special Requests: PERIPHERAL Culture result: NO GROWTH AFTER 20 HOURS    
GLUCOSE, POC Collection Time: 12/29/18 11:51 AM  
Result Value Ref Range Glucose (POC) 114 (H) 70 - 110 mg/dL GLUCOSE, POC Collection Time: 12/29/18  5:13 PM  
Result Value Ref Range Glucose (POC) 137 (H) 70 - 110 mg/dL GLUCOSE, POC Collection Time: 12/29/18  8:54 PM  
Result Value Ref Range Glucose (POC) 116 (H) 70 - 110 mg/dL CBC WITH AUTOMATED DIFF Collection Time: 12/30/18  4:10 AM  
Result Value Ref Range WBC 7.6 4.6 - 13.2 K/uL  
 RBC 3.69 (L) 4.20 - 5.30 M/uL HGB 8.9 (L) 12.0 - 16.0 g/dL HCT 27.9 (L) 35.0 - 45.0 % MCV 75.6 74.0 - 97.0 FL  
 MCH 24.1 24.0 - 34.0 PG  
 MCHC 31.9 31.0 - 37.0 g/dL RDW 20.2 (H) 11.6 - 14.5 % PLATELET 190 224 - 242 K/uL MPV 10.9 9.2 - 11.8 FL  
 NEUTROPHILS 70 40 - 73 % LYMPHOCYTES 19 (L) 21 - 52 % MONOCYTES 8 3 - 10 % EOSINOPHILS 3 0 - 5 % BASOPHILS 0 0 - 2 %  
 ABS. NEUTROPHILS 5.3 1.8 - 8.0 K/UL  
 ABS. LYMPHOCYTES 1.4 0.9 - 3.6 K/UL  
 ABS. MONOCYTES 0.6 0.05 - 1.2 K/UL  
 ABS. EOSINOPHILS 0.2 0.0 - 0.4 K/UL  
 ABS. BASOPHILS 0.0 0.0 - 0.1 K/UL  
 DF AUTOMATED METABOLIC PANEL, COMPREHENSIVE Collection Time: 12/30/18  4:10 AM  
Result Value Ref Range Sodium 138 136 - 145 mmol/L Potassium 3.9 3.5 - 5.5 mmol/L Chloride 107 100 - 108 mmol/L  
 CO2 25 21 - 32 mmol/L Anion gap 6 3.0 - 18 mmol/L Glucose 94 74 - 99 mg/dL BUN 8 7.0 - 18 MG/DL Creatinine 0.54 (L) 0.6 - 1.3 MG/DL  
 BUN/Creatinine ratio 15 12 - 20 GFR est AA >60 >60 ml/min/1.73m2 GFR est non-AA >60 >60 ml/min/1.73m2 Calcium 7.6 (L) 8.5 - 10.1 MG/DL Bilirubin, total 0.6 0.2 - 1.0 MG/DL  
 ALT (SGPT) 51 13 - 56 U/L  
 AST (SGOT) 73 (H) 15 - 37 U/L Alk. phosphatase 92 45 - 117 U/L Protein, total 6.8 6.4 - 8.2 g/dL Albumin 2.2 (L) 3.4 - 5.0 g/dL Globulin 4.6 (H) 2.0 - 4.0 g/dL A-G Ratio 0.5 (L) 0.8 - 1.7 GLUCOSE, POC Collection Time: 12/30/18  5:34 AM  
Result Value Ref Range Glucose (POC) 102 70 - 110 mg/dL Lab/Data Reviewed: All lab results for the last 24 hours reviewed. XRays were reviewed in past 24 hours Medications Reviewed Assessment/Plan Active Problems: 
  Atherosclerotic PVD with intermittent claudication ([address] (12/20/2018) Hypokalemia (12/20/2018) CAD (coronary artery disease) (12/20/2018) DM (diabetes mellitus) ([facility] (12/20/2018) HTN (hypertension) (12/20/2018) Depression (12/20/2018) Current smoker (12/20/2018) Anemia (12/22/2018) Hypotension Hypernatremia Rhabdomyolysis CARE PLAN: 
  
PVD with Claudication  
- Initially on Heparin drip - pain control as needed - s/p Left popliteal and tibial endarterectomy, patch angioplasty. left FEMORAL below knee to POPLITEAL BYPASS 
- Patient on Levophed to keep SBP > 100 per vascular surgery - Vascular Surgery on board - On levaquin. - Off pressors now Hypotension 
-On 12/29,blood transfusion since Hgb 7.5 and patient is hypotensive. 
-Albumin x1 
-BP improved. 
  
CAD 
- Plavix - Losartan  
- aspirin - Lipitor  
  
DM 
- SSI  
- A1c 
  
HTN 
- hold  losartan , Norvasc  due to hypotension 
  
Depression - Wellbutrin , Seroquel, clonepin  
  
Smoker  
- advise cessation  
- Nicotine Patch  
  
Hypokalemia  
- replace  
- resolved Hypernatremia  
- resolved Rhabdomyolysis - resolved Anemia  
- microcytic  
- received blood transfusion yesterday and Hgb now 8.7 Cough - cxr 12/22 c/w right greater than left lower lobe airspace disease or atelectasis 
- lasix 20 mg IV X 1 was given on that day before cxr results known 
- albuterol as needed  
  
DM Neuropathy 
- Neurontin  
  
DVT prophylaxis Full code Disposition:possibly 12/31 Jae Hansen MD 
December 30, 2018

## 2018-12-30 NOTE — PROGRESS NOTES
Pt care assumed by Ismael Nugent (oncoming nurse) by Rodriguez Alaniz RN (offgoing nurse). Report included the following information SBAR and Kardex. Wounds C/D/I, pt just had BM and voided to bedpan, NAD noted, will continue to monitor pt. Bedside shift change report given to Yue Scott RN (oncoming nurse) by Jackie Segura RN (offgoing nurse). Report included the following information SBAR and Kardex.

## 2018-12-31 LAB
ANION GAP SERPL CALC-SCNC: 6 MMOL/L (ref 3–18)
BUN SERPL-MCNC: 8 MG/DL (ref 7–18)
BUN/CREAT SERPL: 17 (ref 12–20)
CALCIUM SERPL-MCNC: 7.7 MG/DL (ref 8.5–10.1)
CHLORIDE SERPL-SCNC: 109 MMOL/L (ref 100–108)
CO2 SERPL-SCNC: 24 MMOL/L (ref 21–32)
CREAT SERPL-MCNC: 0.46 MG/DL (ref 0.6–1.3)
ERYTHROCYTE [DISTWIDTH] IN BLOOD BY AUTOMATED COUNT: 19.8 % (ref 11.6–14.5)
GLUCOSE BLD STRIP.AUTO-MCNC: 103 MG/DL (ref 70–110)
GLUCOSE BLD STRIP.AUTO-MCNC: 77 MG/DL (ref 70–110)
GLUCOSE BLD STRIP.AUTO-MCNC: 97 MG/DL (ref 70–110)
GLUCOSE BLD STRIP.AUTO-MCNC: 97 MG/DL (ref 70–110)
GLUCOSE SERPL-MCNC: 86 MG/DL (ref 74–99)
HCT VFR BLD AUTO: 26.2 % (ref 35–45)
HGB BLD-MCNC: 8.3 G/DL (ref 12–16)
MCH RBC QN AUTO: 23.8 PG (ref 24–34)
MCHC RBC AUTO-ENTMCNC: 31.7 G/DL (ref 31–37)
MCV RBC AUTO: 75.1 FL (ref 74–97)
PLATELET # BLD AUTO: 289 K/UL (ref 135–420)
PMV BLD AUTO: 10.9 FL (ref 9.2–11.8)
POTASSIUM SERPL-SCNC: 3.9 MMOL/L (ref 3.5–5.5)
RBC # BLD AUTO: 3.49 M/UL (ref 4.2–5.3)
SODIUM SERPL-SCNC: 139 MMOL/L (ref 136–145)
WBC # BLD AUTO: 5.2 K/UL (ref 4.6–13.2)

## 2018-12-31 PROCEDURE — 36592 COLLECT BLOOD FROM PICC: CPT

## 2018-12-31 PROCEDURE — 36415 COLL VENOUS BLD VENIPUNCTURE: CPT

## 2018-12-31 PROCEDURE — 97116 GAIT TRAINING THERAPY: CPT

## 2018-12-31 PROCEDURE — 74011250637 HC RX REV CODE- 250/637: Performed by: NURSE PRACTITIONER

## 2018-12-31 PROCEDURE — 74011250636 HC RX REV CODE- 250/636: Performed by: FAMILY MEDICINE

## 2018-12-31 PROCEDURE — 82962 GLUCOSE BLOOD TEST: CPT

## 2018-12-31 PROCEDURE — 80048 BASIC METABOLIC PNL TOTAL CA: CPT

## 2018-12-31 PROCEDURE — 74011250637 HC RX REV CODE- 250/637: Performed by: FAMILY MEDICINE

## 2018-12-31 PROCEDURE — 74011250636 HC RX REV CODE- 250/636: Performed by: NURSE PRACTITIONER

## 2018-12-31 PROCEDURE — 74011250637 HC RX REV CODE- 250/637: Performed by: INTERNAL MEDICINE

## 2018-12-31 PROCEDURE — 85027 COMPLETE CBC AUTOMATED: CPT

## 2018-12-31 PROCEDURE — 65660000000 HC RM CCU STEPDOWN

## 2018-12-31 RX ADMIN — HEPARIN SODIUM 5000 UNITS: 5000 INJECTION INTRAVENOUS; SUBCUTANEOUS at 05:55

## 2018-12-31 RX ADMIN — POTASSIUM CHLORIDE 40 MEQ: 20 TABLET, EXTENDED RELEASE ORAL at 09:50

## 2018-12-31 RX ADMIN — Medication 10 ML: at 13:59

## 2018-12-31 RX ADMIN — QUETIAPINE FUMARATE 300 MG: 300 TABLET, EXTENDED RELEASE ORAL at 21:26

## 2018-12-31 RX ADMIN — CLOPIDOGREL BISULFATE 75 MG: 75 TABLET, FILM COATED ORAL at 09:50

## 2018-12-31 RX ADMIN — Medication 10 ML: at 05:55

## 2018-12-31 RX ADMIN — OXYCODONE AND ACETAMINOPHEN 2 TABLET: 5; 325 TABLET ORAL at 13:57

## 2018-12-31 RX ADMIN — LEVOFLOXACIN 750 MG: 750 TABLET, FILM COATED ORAL at 17:43

## 2018-12-31 RX ADMIN — Medication 20 ML: at 21:31

## 2018-12-31 RX ADMIN — HEPARIN SODIUM 5000 UNITS: 5000 INJECTION INTRAVENOUS; SUBCUTANEOUS at 13:57

## 2018-12-31 RX ADMIN — BUPROPION HYDROCHLORIDE 100 MG: 100 TABLET, FILM COATED, EXTENDED RELEASE ORAL at 09:50

## 2018-12-31 RX ADMIN — HEPARIN SODIUM 5000 UNITS: 5000 INJECTION INTRAVENOUS; SUBCUTANEOUS at 21:27

## 2018-12-31 RX ADMIN — OXYCODONE AND ACETAMINOPHEN 2 TABLET: 5; 325 TABLET ORAL at 06:17

## 2018-12-31 RX ADMIN — SODIUM CHLORIDE 250 ML: 0.9 INJECTION, SOLUTION INTRAVENOUS at 09:46

## 2018-12-31 RX ADMIN — STANDARDIZED SENNA CONCENTRATE AND DOCUSATE SODIUM 1 TABLET: 8.6; 5 TABLET, FILM COATED ORAL at 09:50

## 2018-12-31 RX ADMIN — ATORVASTATIN CALCIUM 80 MG: 40 TABLET, FILM COATED ORAL at 09:50

## 2018-12-31 NOTE — PROGRESS NOTES
Problem: Falls - Risk of 
Goal: *Absence of Falls Document Reino Horn Fall Risk and appropriate interventions in the flowsheet. Outcome: Progressing Towards Goal 
Fall Risk Interventions: 
Mobility Interventions: Patient to call before getting OOB Medication Interventions: Patient to call before getting OOB, Teach patient to arise slowly Elimination Interventions: Call light in reach, Toileting schedule/hourly rounds Problem: Pressure Injury - Risk of 
Goal: *Prevention of pressure injury Document Carlo Scale and appropriate interventions in the flowsheet. Outcome: Progressing Towards Goal 
Pressure Injury Interventions: 
Sensory Interventions: Assess need for specialty bed, Assess changes in LOC, Minimize linen layers, Monitor skin under medical devices, Maintain/enhance activity level Moisture Interventions: Absorbent underpads Activity Interventions: Increase time out of bed, Pressure redistribution bed/mattress(bed type) Mobility Interventions: HOB 30 degrees or less, Pressure redistribution bed/mattress (bed type) Nutrition Interventions: Document food/fluid/supplement intake Friction and Shear Interventions: HOB 30 degrees or less, Lift sheet, Lift team/patient mobility team

## 2018-12-31 NOTE — PROGRESS NOTES
Acknowledged duplicate PT orders. Patient already on PT caseload. Please refer to PT evaluation for discharge recommendations and plan of care. Thank you, Shar Yadav, PT, DPT Office Extension: [phone] Pager: [phone]

## 2018-12-31 NOTE — PROGRESS NOTES
OT order received, chart reviewed. Pt's BP 89/63 this AM. Mobility not appropriate at this time. 1315: Pt refusing stating she is still eating lunch. 1346: pt working with PT. Will follow up on 1/1/2019. Raimundo Sheppard MS OTR/L Office Ext: [phone] Pager: [phone]

## 2018-12-31 NOTE — PROGRESS NOTES
Assumed care of patient, patient in be resting. Call light within reach. Sbar report receive from Nish Rene order received from Dr. Aleksandr Rebolledo to restart patient home medication Wellbutrin  mg. Order entered as received. 1641: Albumin human 5% in 25 g solution was administrated. Bedside / verbal shift change report given to [id number] [facility] (oncoming nurse) by Pro Oconnell RN (offgoing nurse). Report included the following information SBAR, Kardex, Intake/Output, MAR and Recent Results.

## 2018-12-31 NOTE — PROGRESS NOTES
Progress Note Patient: Daniela Mercer               Sex: female          DOA: 12/20/2018 YOB: 1965      Age:  48 y.o.        LOS:  LOS: 11 days Subjective / Interval Hx I   
  
Patient post Left popliteal and tibial endarterectomy, patch angioplasty. left FEMORAL below knee to POPLITEAL BYPASS yesterday PM. No fever. C/o pain in the middle toe . On Levophed per Vascular surgery . EKG was noted to have EKG abnormality. Patient denies CP. 
 
12/23 : Episodes of hypotension noted , now resolved . On pressors Levophed. Continue to hold BP medications 12/24:Pt saying that she wants to go home 12/25:weaning off pressors;received blood transfusion 12/27:no complaint 12/31: Pain on walking only Objective:  
  
Visit Vitals BP (!) 89/63 Pulse 85 Temp 97.8 °F (36.6 °C) Resp 16 Ht 5' 5\" (1.651 m) Wt 78.7 kg (173 lb 8 oz) SpO2 100% BMI 28.87 kg/m² P/E 
NAD,sitting comfortably in the chair. Heent:perrla,at/nc,mouth moist. 
Lungs ctab Heart s1s2 nl,no m/g Abdm:soft,not tender,bs present. Extr:s/p surgery,staples in place. Palpable pedis pulse rt,noted with doppler in the left. Neuro:aaox3. Intake and Output: 
Current Shift:  No intake/output data recorded. Last three shifts:  12/29 1901 - 12/31 0700 In: 500 Out: 852 [Urine:851] Recent Results (from the past 48 hour(s)) CULTURE, BLOOD Collection Time: 12/29/18  9:40 AM  
Result Value Ref Range Special Requests: PERIPHERAL Culture result: NO GROWTH 2 DAYS    
GLUCOSE, POC Collection Time: 12/29/18 11:51 AM  
Result Value Ref Range Glucose (POC) 114 (H) 70 - 110 mg/dL GLUCOSE, POC Collection Time: 12/29/18  5:13 PM  
Result Value Ref Range Glucose (POC) 137 (H) 70 - 110 mg/dL GLUCOSE, POC Collection Time: 12/29/18  8:54 PM  
Result Value Ref Range Glucose (POC) 116 (H) 70 - 110 mg/dL CBC WITH AUTOMATED DIFF  Collection Time: 12/30/18  4:10 AM  
 Result Value Ref Range WBC 7.6 4.6 - 13.2 K/uL  
 RBC 3.69 (L) 4.20 - 5.30 M/uL HGB 8.9 (L) 12.0 - 16.0 g/dL HCT 27.9 (L) 35.0 - 45.0 % MCV 75.6 74.0 - 97.0 FL  
 MCH 24.1 24.0 - 34.0 PG  
 MCHC 31.9 31.0 - 37.0 g/dL RDW 20.2 (H) 11.6 - 14.5 % PLATELET 555 553 - 418 K/uL MPV 10.9 9.2 - 11.8 FL  
 NEUTROPHILS 70 40 - 73 % LYMPHOCYTES 19 (L) 21 - 52 % MONOCYTES 8 3 - 10 % EOSINOPHILS 3 0 - 5 % BASOPHILS 0 0 - 2 %  
 ABS. NEUTROPHILS 5.3 1.8 - 8.0 K/UL  
 ABS. LYMPHOCYTES 1.4 0.9 - 3.6 K/UL  
 ABS. MONOCYTES 0.6 0.05 - 1.2 K/UL  
 ABS. EOSINOPHILS 0.2 0.0 - 0.4 K/UL  
 ABS. BASOPHILS 0.0 0.0 - 0.1 K/UL  
 DF AUTOMATED METABOLIC PANEL, COMPREHENSIVE Collection Time: 12/30/18  4:10 AM  
Result Value Ref Range Sodium 138 136 - 145 mmol/L Potassium 3.9 3.5 - 5.5 mmol/L Chloride 107 100 - 108 mmol/L  
 CO2 25 21 - 32 mmol/L Anion gap 6 3.0 - 18 mmol/L Glucose 94 74 - 99 mg/dL BUN 8 7.0 - 18 MG/DL Creatinine 0.54 (L) 0.6 - 1.3 MG/DL  
 BUN/Creatinine ratio 15 12 - 20 GFR est AA >60 >60 ml/min/1.73m2 GFR est non-AA >60 >60 ml/min/1.73m2 Calcium 7.6 (L) 8.5 - 10.1 MG/DL Bilirubin, total 0.6 0.2 - 1.0 MG/DL  
 ALT (SGPT) 51 13 - 56 U/L  
 AST (SGOT) 73 (H) 15 - 37 U/L Alk. phosphatase 92 45 - 117 U/L Protein, total 6.8 6.4 - 8.2 g/dL Albumin 2.2 (L) 3.4 - 5.0 g/dL Globulin 4.6 (H) 2.0 - 4.0 g/dL A-G Ratio 0.5 (L) 0.8 - 1.7 GLUCOSE, POC Collection Time: 12/30/18  5:34 AM  
Result Value Ref Range Glucose (POC) 102 70 - 110 mg/dL GLUCOSE, POC Collection Time: 12/30/18 12:20 PM  
Result Value Ref Range Glucose (POC) 128 (H) 70 - 110 mg/dL GLUCOSE, POC Collection Time: 12/30/18  4:31 PM  
Result Value Ref Range Glucose (POC) 112 (H) 70 - 110 mg/dL GLUCOSE, POC Collection Time: 12/30/18  8:48 PM  
Result Value Ref Range Glucose (POC) 103 70 - 110 mg/dL CBC W/O DIFF  Collection Time: 12/31/18  6:00 AM  
 Result Value Ref Range WBC 5.2 4.6 - 13.2 K/uL  
 RBC 3.49 (L) 4.20 - 5.30 M/uL HGB 8.3 (L) 12.0 - 16.0 g/dL HCT 26.2 (L) 35.0 - 45.0 % MCV 75.1 74.0 - 97.0 FL  
 MCH 23.8 (L) 24.0 - 34.0 PG  
 MCHC 31.7 31.0 - 37.0 g/dL  
 RDW 19.8 (H) 11.6 - 14.5 % PLATELET 592 055 - 317 K/uL MPV 10.9 9.2 - 47.9 FL  
METABOLIC PANEL, BASIC Collection Time: 12/31/18  6:00 AM  
Result Value Ref Range Sodium 139 136 - 145 mmol/L Potassium 3.9 3.5 - 5.5 mmol/L Chloride 109 (H) 100 - 108 mmol/L  
 CO2 24 21 - 32 mmol/L Anion gap 6 3.0 - 18 mmol/L Glucose 86 74 - 99 mg/dL BUN 8 7.0 - 18 MG/DL Creatinine 0.46 (L) 0.6 - 1.3 MG/DL  
 BUN/Creatinine ratio 17 12 - 20 GFR est AA >60 >60 ml/min/1.73m2 GFR est non-AA >60 >60 ml/min/1.73m2 Calcium 7.7 (L) 8.5 - 10.1 MG/DL  
GLUCOSE, POC Collection Time: 12/31/18  6:24 AM  
Result Value Ref Range Glucose (POC) 77 70 - 110 mg/dL Lab/Data Reviewed: All lab results for the last 24 hours reviewed. XRays were reviewed in past 24 hours Medications Reviewed Assessment/Plan Active Problems: 
  Atherosclerotic PVD with intermittent claudication ([facility] (12/20/2018) Hypokalemia (12/20/2018) CAD (coronary artery disease) (12/20/2018) DM (diabetes mellitus) ([facility] (12/20/2018) HTN (hypertension) (12/20/2018) Depression (12/20/2018) Current smoker (12/20/2018) Anemia (12/22/2018) Hypotension Hypernatremia Rhabdomyolysis CARE PLAN: 
  
PVD with Claudication  
- Initially on Heparin drip - pain control as needed - s/p Left popliteal and tibial endarterectomy, patch angioplasty. left FEMORAL below knee to POPLITEAL BYPASS 
- Patient on Levophed to keep SBP > 100 per vascular surgery - Vascular Surgery on board - On levaquin. - Off pressors now Hypotension 
-On 12/29,blood transfusion since Hgb 7.5 and patient is hypotensive. 
-Albumin x1 
- Monitor BP 89/63 this AM  
 - IV normal saline 250 cc bolus 
  
CAD 
- Plavix - Losartan  
- aspirin - Lipitor  
  
DM 
- SSI  
- A1c 
  
HTN 
- hold  losartan , Norvasc  due to hypotension 
  
Depression - Wellbutrin , Seroquel, clonepin  
  
Smoker  
- advise cessation  
- Nicotine Patch  
  
Hypokalemia  
- replace  
- resolved Hypernatremia  
- resolved Rhabdomyolysis - resolved Anemia  
- microcytic  
- received blood transfusion yesterday and Hgb now 8.7 Cough - cxr 12/22 c/w right greater than left lower lobe airspace disease or atelectasis 
- lasix 20 mg IV X 1 was given on that day before cxr results known 
- albuterol as needed  
  
DM Neuropathy 
- Neurontin  
  
DVT prophylaxis Full code Disposition:possibly :home with home health 1/1/19 if BP hypotension resolves Eliel Alvarez MD 
December 31, 2018

## 2018-12-31 NOTE — PROGRESS NOTES
completed follow up visit with patient in room 2204 this morning and a Spiritual assessment of patient was done. Patient reported that she is doing ok and hoping to go home tomorrow. Chaplains will continue to follow and will provide pastoral care on an as needed/requested basis Shanique 3 Board Certified [employer] Spiritual Care  
[phone]

## 2018-12-31 NOTE — PROGRESS NOTES
Day #9 of LVQ Indication:  Sepsis Current regimen:   
- CTX 1 gm IV every 24 hours (completed 7 days) -  mg IV every 24 hours Recent Labs 18 
0600 18 
0410 18 
0500 18 
0000 WBC 5.2 7.6 5.5 6.5 CREA 0.46* 0.54*  --  0.64  
BUN 8 8  --  9 Est CrCl: > 100 ml/min Temp (24hrs), Av °F (37.2 °C), Min:97.8 °F (36.6 °C), Max:100.4 °F (38 °C) Cultures: Blood - NG - final 
 
Plan:  
- Continue levofloxacin  
- Consider streamlining antibiotic therapy if patient is clinically stable Thanks, 
Wiley Hills, PHARMD

## 2018-12-31 NOTE — PROGRESS NOTES
Problem: Mobility Impaired (Adult and Pediatric) Goal: *Acute Goals and Plan of Care (Insert Text) Physical Therapy Goals Initiated 12/27/2018 and to be accomplished within 7 days. 1.  Patient will complete all bed mobility with supervision/set-up in order to prepare for EOB/OOB activity. 2.  Patient will perform sit <> stand with minimal assistance/contact guard assist in order to prepare for OOB/gait activity. 3.  Patient will perform bed to chair transfers with minimal assistance/contact guard assist in order to promote mobility and encourage seated activity to progress towards their prior level of function. 4.  Patient will ambulate 15 feet with minimal assistance/contact guard assist using LRAD in order to prepare for safe negotiation of their environment. Outcome: Progressing Towards Goal 
 
PHYSICAL THERAPY: Daily TREATMENT Note INPATIENT: Medicare: Hospital Day: 12 Patient: Remi Silveira (58 y.o. female)    Date: 12/31/2018 Primary Diagnosis: Atherosclerotic PVD with intermittent claudication (HCC) Procedure(s) (LRB): 
left FEMORAL below knee to POPLITEAL BYPASS (Right), 10 Days Post-Op, Precautions: (None) Chart, physical therapy assessment, plan of care and goals were reviewed. PLOF: Independent ASSESSMENT: 
Patient supine in bed, agreeable to participation with PT. SBA for supine > sit. CGA for sit <> stand with RW for support. Supervision for ambulation x 16 ft with RW; gait antalgic. Patient demo's improved tolerance with activity. Left sitting in recliner with all needs within reach. C/o L LE pain following activity and requesting pain medicine, MARTIN Sue notified. Progression toward goals: 
       Improving slowly and progressing toward goals PLAN: 
Patient continues to benefit from skilled intervention to address the above impairments. Continue treatment per established plan of care.  
 
EDUCATION:  
 Education:  Patient was educated on the following topics: Transfer, gait training. Verbalizes understanding. Barriers to Learning/Limitations: None Compensate with: visual, verbal, tactile, kinesthetic cues/model Discharge Recommendations:  Barrera Shelton Further Equipment Recommendations for Discharge:  rolling walker Factors which may impact discharge planning: N/A  
 
SUBJECTIVE:  
Patient stated It's not that bad today.  OBJECTIVE DATA SUMMARY:  
Critical Behavior: 
Neurologic State: Alert Orientation Level: Oriented X4 Cognition: Follows commands G CODE:Mobility [id number] Current  CJ= 20-39%   Goal  CJ= 20-39%. The severity rating is based on the Other CGA - supervision for mobilityFunctional Mobility: 
 
 
Functional Status Indep (I) Mod I Super-vision Min A Mod A Max A Total A Assist x2 Verbal cues Additional time Not tested Comments Rolling []  []  [] []    []    []  []  [] [] [] [x] Supine to sit []  []  [x] []  []  []  []  [] [] [] [] SBA Sit to supine []  []  [] []  []  []  []  [] [] [] [x] Sit to stand []  []  [x] []  []  []  []  [] [] [] [] CGA Stand to sit []  []  [x] []  []  []  []  [] [] [] [] Bed to chair transfers []  []  [] []  []  []  []  [] [] [] [x] Balance Good Jannette Ruiz Poor Unable Not tested Comments Sitting static [x]  []  []  []  [] Sitting dynamic [x]  []  []  []  []   
Standing static [x]  []  []  []  []   
Standing dynamic [x]  []  []  []  [] Mobility/Gait:  
Level of Assistance: Supervision Assistive Device: rolling walker Distance Ambulated: 16 feet Left Lower Extremity: FWB Right Lower Extremity: FWB Base of Support: center of gravity altered Speed/Kym: pace decreased (<100 feet/min) Step Length: right shortened Swing Pattern: left asymmetrical 
Stance: left decreased Gait Abnormalities: antalgic Vital Signs Temp: 98.1 °F (36.7 °C) Pulse (Heart Rate): 82    
 BP: 105/65 Resp Rate: 18    
O2 Sat (%): 95 % Pain: 
L LE pain with gait, RN notified. Activity Tolerance:  
Fair After treatment:  
Patient left in no apparent distress sitting up in chair Call bell left within reach Nursing notified Gideon Sernagomery Time Calculation: 13 mins

## 2018-12-31 NOTE — PROGRESS NOTES
Bedside and Verbal shift change report given to Carmelo Irwin RN (oncoming nurse) by Flor Andrew RN (offgoing nurse). Report included the following information SBAR, Kardex, Intake/Output and MAR. Bedside and Verbal shift change report given to Paul Huber RN (oncoming nurse) by Carmelo Irwin RN (offgoing nurse). Report included the following information SBAR, Kardex, Intake/Output and MAR.

## 2018-12-31 NOTE — PROGRESS NOTES
Problem: Falls - Risk of 
Goal: *Absence of Falls Document [place] Officer Fall Risk and appropriate interventions in the flowsheet. Outcome: Progressing Towards Goal 
Fall Risk Interventions: 
Mobility Interventions: Patient to call before getting OOB Medication Interventions: Patient to call before getting OOB Elimination Interventions: Call light in reach Problem: Pressure Injury - Risk of 
Goal: *Prevention of pressure injury Document Carlo Scale and appropriate interventions in the flowsheet. Outcome: Progressing Towards Goal 
Pressure Injury Interventions: 
Sensory Interventions: Discuss PT/OT consult with provider, Keep linens dry and wrinkle-free Moisture Interventions: Apply protective barrier, creams and emollients Activity Interventions: Increase time out of bed Mobility Interventions: HOB 30 degrees or less Nutrition Interventions: Document food/fluid/supplement intake Friction and Shear Interventions: Lift sheet

## 2018-12-31 NOTE — PROGRESS NOTES
Problem: Falls - Risk of 
Goal: *Absence of Falls Document Christina[place] Goodenil Fall Risk and appropriate interventions in the flowsheet. Outcome: Progressing Towards Goal 
Fall Risk Interventions: 
Mobility Interventions: Patient to call before getting OOB Medication Interventions: Patient to call before getting OOB Elimination Interventions: Call light in reach Problem: Pressure Injury - Risk of 
Goal: *Prevention of pressure injury Document Carlo Scale and appropriate interventions in the flowsheet. Outcome: Progressing Towards Goal 
Pressure Injury Interventions: 
Sensory Interventions: Assess changes in LOC Moisture Interventions: Absorbent underpads Activity Interventions: Increase time out of bed Mobility Interventions: HOB 30 degrees or less Nutrition Interventions: Document food/fluid/supplement intake Friction and Shear Interventions: Lift sheet

## 2018-12-31 NOTE — PROGRESS NOTES
Bedside shift change report given to Geovanni Marino RN (oncoming nurse) by Bianca Segura RN (offgoing nurse). Report included the following information SBAR, Kardex and MAR. Bedside shift change report given to Anirudh Mcginnis RN (oncoming nurse) by Geovanni Marino RN (offgoing nurse). Report included the following information SBAR, Kardex and MAR.

## 2019-01-01 VITALS
OXYGEN SATURATION: 93 % | HEIGHT: 65 IN | RESPIRATION RATE: 18 BRPM | BODY MASS INDEX: 28.91 KG/M2 | TEMPERATURE: 99 F | WEIGHT: 173.5 LBS | SYSTOLIC BLOOD PRESSURE: 121 MMHG | DIASTOLIC BLOOD PRESSURE: 81 MMHG | HEART RATE: 87 BPM

## 2019-01-01 LAB
ANION GAP SERPL CALC-SCNC: 7 MMOL/L (ref 3–18)
BUN SERPL-MCNC: 9 MG/DL (ref 7–18)
BUN/CREAT SERPL: 15 (ref 12–20)
CALCIUM SERPL-MCNC: 8.4 MG/DL (ref 8.5–10.1)
CHLORIDE SERPL-SCNC: 103 MMOL/L (ref 100–108)
CO2 SERPL-SCNC: 25 MMOL/L (ref 21–32)
CREAT SERPL-MCNC: 0.61 MG/DL (ref 0.6–1.3)
ERYTHROCYTE [DISTWIDTH] IN BLOOD BY AUTOMATED COUNT: 20.1 % (ref 11.6–14.5)
GLUCOSE BLD STRIP.AUTO-MCNC: 106 MG/DL (ref 70–110)
GLUCOSE BLD STRIP.AUTO-MCNC: 88 MG/DL (ref 70–110)
GLUCOSE SERPL-MCNC: 87 MG/DL (ref 74–99)
HCT VFR BLD AUTO: 28.2 % (ref 35–45)
HGB BLD-MCNC: 9 G/DL (ref 12–16)
MCH RBC QN AUTO: 23.7 PG (ref 24–34)
MCHC RBC AUTO-ENTMCNC: 31.9 G/DL (ref 31–37)
MCV RBC AUTO: 74.2 FL (ref 74–97)
PLATELET # BLD AUTO: 383 K/UL (ref 135–420)
PMV BLD AUTO: 11.3 FL (ref 9.2–11.8)
POTASSIUM SERPL-SCNC: 4.2 MMOL/L (ref 3.5–5.5)
RBC # BLD AUTO: 3.8 M/UL (ref 4.2–5.3)
SODIUM SERPL-SCNC: 135 MMOL/L (ref 136–145)
WBC # BLD AUTO: 6.5 K/UL (ref 4.6–13.2)

## 2019-01-01 PROCEDURE — 74011250637 HC RX REV CODE- 250/637: Performed by: FAMILY MEDICINE

## 2019-01-01 PROCEDURE — 80048 BASIC METABOLIC PNL TOTAL CA: CPT

## 2019-01-01 PROCEDURE — 82962 GLUCOSE BLOOD TEST: CPT

## 2019-01-01 PROCEDURE — 74011250636 HC RX REV CODE- 250/636: Performed by: NURSE PRACTITIONER

## 2019-01-01 PROCEDURE — 85027 COMPLETE CBC AUTOMATED: CPT

## 2019-01-01 PROCEDURE — 74011250637 HC RX REV CODE- 250/637: Performed by: NURSE PRACTITIONER

## 2019-01-01 PROCEDURE — 97165 OT EVAL LOW COMPLEX 30 MIN: CPT

## 2019-01-01 PROCEDURE — 97535 SELF CARE MNGMENT TRAINING: CPT

## 2019-01-01 PROCEDURE — 74011250637 HC RX REV CODE- 250/637: Performed by: INTERNAL MEDICINE

## 2019-01-01 PROCEDURE — 97116 GAIT TRAINING THERAPY: CPT

## 2019-01-01 RX ORDER — HYDROMORPHONE HYDROCHLORIDE 1 MG/ML
0.5 INJECTION, SOLUTION INTRAMUSCULAR; INTRAVENOUS; SUBCUTANEOUS
Status: DISCONTINUED | OUTPATIENT
Start: 2019-01-01 | End: 2019-01-01 | Stop reason: HOSPADM

## 2019-01-01 RX ORDER — LEVOFLOXACIN 750 MG/1
750 TABLET ORAL EVERY 24 HOURS
Qty: 5 TAB | Refills: 0 | Status: SHIPPED | OUTPATIENT
Start: 2019-01-01 | End: 2019-08-06

## 2019-01-01 RX ORDER — CLOPIDOGREL BISULFATE 75 MG/1
75 TABLET ORAL DAILY
Qty: 30 TAB | Refills: 0 | Status: SHIPPED | OUTPATIENT
Start: 2019-01-02 | End: 2019-08-17

## 2019-01-01 RX ORDER — OXYCODONE AND ACETAMINOPHEN 5; 325 MG/1; MG/1
1-2 TABLET ORAL
Qty: 20 TAB | Refills: 0 | Status: SHIPPED | OUTPATIENT
Start: 2019-01-01 | End: 2019-08-06

## 2019-01-01 RX ADMIN — Medication 10 ML: at 06:00

## 2019-01-01 RX ADMIN — ATORVASTATIN CALCIUM 80 MG: 40 TABLET, FILM COATED ORAL at 08:22

## 2019-01-01 RX ADMIN — BUPROPION HYDROCHLORIDE 100 MG: 100 TABLET, FILM COATED, EXTENDED RELEASE ORAL at 08:22

## 2019-01-01 RX ADMIN — HEPARIN SODIUM 5000 UNITS: 5000 INJECTION INTRAVENOUS; SUBCUTANEOUS at 05:03

## 2019-01-01 RX ADMIN — HEPARIN SODIUM 5000 UNITS: 5000 INJECTION INTRAVENOUS; SUBCUTANEOUS at 12:58

## 2019-01-01 RX ADMIN — STANDARDIZED SENNA CONCENTRATE AND DOCUSATE SODIUM 1 TABLET: 8.6; 5 TABLET, FILM COATED ORAL at 08:22

## 2019-01-01 RX ADMIN — OXYCODONE AND ACETAMINOPHEN 2 TABLET: 5; 325 TABLET ORAL at 10:15

## 2019-01-01 RX ADMIN — POTASSIUM CHLORIDE 40 MEQ: 20 TABLET, EXTENDED RELEASE ORAL at 08:24

## 2019-01-01 RX ADMIN — CLOPIDOGREL BISULFATE 75 MG: 75 TABLET, FILM COATED ORAL at 08:22

## 2019-01-01 NOTE — PROGRESS NOTES
Received bedside and verbal shift change report from [address] report included the following information SBAR, Kardex, Intake/Output and MAR. Pt sitting at the edge of the bed a&ox4 denied acute respiratory distress and pain tolerable. Call bell within pt reach will continue to monitor closely. 2127 Pt remain stable voiced no complaint due med tolerated. Assisted to bathroom voiding without issue. Back to bed resting call bell within reach will continue to monitor. 0522 Pt had uneventful shift denied pain thru this shift assisted to bathroom as needed voiding without complaint. Currently resting in the bed call bell within reach. Bedside and Verbal shift change report given to Jaron Richardson (oncoming nurse) by Rebecca Morris RN (offgoing nurse). Report included the following information SBAR, Kardex, Intake/Output and MAR.

## 2019-01-01 NOTE — DISCHARGE INSTRUCTIONS
DISCHARGE SUMMARY from Nurse    PATIENT INSTRUCTIONS:    After general anesthesia or intravenous sedation, for 24 hours or while taking prescription Narcotics:  · Limit your activities  · Do not drive and operate hazardous machinery  · Do not make important personal or business decisions  · Do  not drink alcoholic beverages  · If you have not urinated within 8 hours after discharge, please contact your surgeon on call. Report the following to your surgeon:  · Excessive pain, swelling, redness or odor of or around the surgical area  · Temperature over 100.5  · Nausea and vomiting lasting longer than 4 hours or if unable to take medications  · Any signs of decreased circulation or nerve impairment to extremity: change in color, persistent  numbness, tingling, coldness or increase pain  · Any questions    What to do at Home:  Recommended activity: Activity as tolerated    *  Please give a list of your current medications to your Primary Care Provider. *  Please update this list whenever your medications are discontinued, doses are      changed, or new medications (including over-the-counter products) are added. *  Please carry medication information at all times in case of emergency situations. These are general instructions for a healthy lifestyle:    No smoking/ No tobacco products/ Avoid exposure to second hand smoke  Surgeon General's Warning:  Quitting smoking now greatly reduces serious risk to your health. Obesity, smoking, and sedentary lifestyle greatly increases your risk for illness    A healthy diet, regular physical exercise & weight monitoring are important for maintaining a healthy lifestyle    You may be retaining fluid if you have a history of heart failure or if you experience any of the following symptoms:  Weight gain of 3 pounds or more overnight or 5 pounds in a week, increased swelling in our hands or feet or shortness of breath while lying flat in bed.   Please call your doctor as soon as you notice any of these symptoms; do not wait until your next office visit. Recognize signs and symptoms of STROKE:    F-face looks uneven    A-arms unable to move or move unevenly    S-speech slurred or non-existent    T-time-call 911 as soon as signs and symptoms begin-DO NOT go       Back to bed or wait to see if you get better-TIME IS BRAIN. Warning Signs of HEART ATTACK     Call 911 if you have these symptoms:   Chest discomfort. Most heart attacks involve discomfort in the center of the chest that lasts more than a few minutes, or that goes away and comes back. It can feel like uncomfortable pressure, squeezing, fullness, or pain.  Discomfort in other areas of the upper body. Symptoms can include pain or discomfort in one or both arms, the back, neck, jaw, or stomach.  Shortness of breath with or without chest discomfort.  Other signs may include breaking out in a cold sweat, nausea, or lightheadedness. Don't wait more than five minutes to call 911 - MINUTES MATTER! Fast action can save your life. Calling 911 is almost always the fastest way to get lifesaving treatment. Emergency Medical Services staff can begin treatment when they arrive -- up to an hour sooner than if someone gets to the hospital by car. The discharge information has been reviewed with the patient. The patient verbalized understanding. Discharge medications reviewed with the patient and appropriate educational materials and side effects teaching were provided. Patient armband removed and shredded. Learning About Diabetes Food Guidelines  Your Care Instructions    Meal planning is important to manage diabetes. It helps keep your blood sugar at a target level (which you set with your doctor). You don't have to eat special foods. You can eat what your family eats, including sweets once in a while. But you do have to pay attention to how often you eat and how much you eat of certain foods.   You may want to work with a dietitian or a certified diabetes educator (CDE) to help you plan meals and snacks. A dietitian or CDE can also help you lose weight if that is one of your goals. What should you know about eating carbs? Managing the amount of carbohydrate (carbs) you eat is an important part of healthy meals when you have diabetes. Carbohydrate is found in many foods. · Learn which foods have carbs. And learn the amounts of carbs in different foods. ? Bread, cereal, pasta, and rice have about 15 grams of carbs in a serving. A serving is 1 slice of bread (1 ounce), ½ cup of cooked cereal, or 1/3 cup of cooked pasta or rice. ? Fruits have 15 grams of carbs in a serving. A serving is 1 small fresh fruit, such as an apple or orange; ½ of a banana; ½ cup of cooked or canned fruit; ½ cup of fruit juice; 1 cup of melon or raspberries; or 2 tablespoons of dried fruit. ? Milk and no-sugar-added yogurt have 15 grams of carbs in a serving. A serving is 1 cup of milk or 2/3 cup of no-sugar-added yogurt. ? Starchy vegetables have 15 grams of carbs in a serving. A serving is ½ cup of mashed potatoes or sweet potato; 1 cup winter squash; ½ of a small baked potato; ½ cup of cooked beans; or ½ cup cooked corn or green peas. · Learn how much carbs to eat each day and at each meal. A dietitian or CDE can teach you how to keep track of the amount of carbs you eat. This is called carbohydrate counting. · If you are not sure how to count carbohydrate grams, use the Plate Method to plan meals. It is a good, quick way to make sure that you have a balanced meal. It also helps you spread carbs throughout the day. ? Divide your plate by types of foods. Put non-starchy vegetables on half the plate, meat or other protein food on one-quarter of the plate, and a grain or starchy vegetable in the final quarter of the plate.  To this you can add a small piece of fruit and 1 cup of milk or yogurt, depending on how many carbs you are supposed to eat at a meal.  · Try to eat about the same amount of carbs at each meal. Do not \"save up\" your daily allowance of carbs to eat at one meal.  · Proteins have very little or no carbs per serving. Examples of proteins are beef, chicken, turkey, fish, eggs, tofu, cheese, cottage cheese, and peanut butter. A serving size of meat is 3 ounces, which is about the size of a deck of cards. Examples of meat substitute serving sizes (equal to 1 ounce of meat) are 1/4 cup of cottage cheese, 1 egg, 1 tablespoon of peanut butter, and ½ cup of tofu. How can you eat out and still eat healthy? · Learn to estimate the serving sizes of foods that have carbohydrate. If you measure food at home, it will be easier to estimate the amount in a serving of restaurant food. · If the meal you order has too much carbohydrate (such as potatoes, corn, or baked beans), ask to have a low-carbohydrate food instead. Ask for a salad or green vegetables. · If you use insulin, check your blood sugar before and after eating out to help you plan how much to eat in the future. · If you eat more carbohydrate at a meal than you had planned, take a walk or do other exercise. This will help lower your blood sugar. What else should you know? · Limit saturated fat, such as the fat from meat and dairy products. This is a healthy choice because people who have diabetes are at higher risk of heart disease. So choose lean cuts of meat and nonfat or low-fat dairy products. Use olive or canola oil instead of butter or shortening when cooking. · Don't skip meals. Your blood sugar may drop too low if you skip meals and take insulin or certain medicines for diabetes. · Check with your doctor before you drink alcohol. Alcohol can cause your blood sugar to drop too low. Alcohol can also cause a bad reaction if you take certain diabetes medicines. Follow-up care is a key part of your treatment and safety.  Be sure to make and go to all appointments, and call your doctor if you are having problems. It's also a good idea to know your test results and keep a list of the medicines you take. Where can you learn more? Go to http://rolanda-leighann.info/. Enter B003 in the search box to learn more about \"Learning About Diabetes Food Guidelines. \"  Current as of: December 7, 2017  Content Version: 11.8  © 0171-8728 [employer]. Care instructions adapted under license by [employer] (which disclaims liability or warranty for this information). If you have questions about a medical condition or this instruction, always ask your healthcare professional. [place] [address] any warranty or liability for your use of this information. Femoral-Tibial Bypass Surgery: What to Expect at Home  Your Recovery    Femoral-tibial bypass is surgery to bypass diseased blood vessels in the lower leg or foot. You will have some pain from the cuts (incisions) the doctor made. The pain usually gets better after about 1 week. Your doctor will give you pain medicine. You can expect your leg to be swollen at first. This is a normal part of recovery and may last 2 or 3 months. You may need to stay in the hospital for 3 to 5 days. You will need to take it easy for 2 to 6 weeks at home. It may take 6 to 12 weeks to fully recover. You will need to have regular checkups with your doctor to make sure the graft is working. This care sheet gives you a general idea about how long it will take for you to recover. But each person recovers at a different pace. Follow the steps below to get better as quickly as possible. How can you care for yourself at home? Activity    · Rest when you feel tired. Getting enough sleep will help you recover.     · Try to walk each day or as often as your doctor tells you. Start by walking a little more than you did the day before. Bit by bit, increase the amount you walk.  Walking boosts blood flow and helps prevent pneumonia and constipation.     · Avoid strenuous activities, such as bicycle riding, jogging, weight lifting, or aerobic exercise. Your doctor will tell you when it's okay to do strenuous activity.     · Ask your doctor when you can drive again.     · You will probably need to take 2 to 6 weeks off from work. It depends on the type of work you do and how you feel.     · You may shower, if your doctor okays it. Do not take a bath for the first 2 weeks, or until your doctor tells you it is okay. Diet    · You can eat your normal diet. If your stomach is upset, try bland, low-fat foods like plain rice, broiled chicken, toast, and yogurt.     · Drink plenty of fluids (unless your doctor tells you not to).     · You may notice that your bowel movements are not regular right after your surgery. This is common. You may want to take a fiber supplement every day. If you have not had a bowel movement after a couple of days, ask your doctor about taking a mild laxative. Medicines    · Your doctor will tell you if and when you can restart your medicines. He or she will also give you instructions about taking any new medicines.     · If you take blood thinners, such as warfarin (Coumadin), clopidogrel (Plavix), or aspirin, be sure to talk to your doctor. He or she will tell you if and when to start taking those medicines again. Make sure that you understand exactly what your doctor wants you to do.     · Take your medicines exactly as prescribed. Call your doctor if you think you are having a problem with your medicine.     · Your doctor may prescribe a blood thinner, such as aspirin, when you go home. This helps prevent blood clots.     · Be safe with medicines. Take pain medicines exactly as directed. ? If the doctor gave you a prescription medicine for pain, take it as prescribed.   ? If you are not taking a prescription pain medicine, ask your doctor if you can take an over-the-counter medicine.     · If you think your pain medicine is making you sick to your stomach:  ? Take your medicine after meals (unless your doctor has told you not to). ? Ask your doctor for a different pain medicine.     · If your doctor prescribed antibiotics, take them as directed. Do not stop taking them just because you feel better. You need to take the full course of antibiotics. Incision care    · If you have bandages on the incisions, follow your doctor's instructions about changing them.     · If you have strips of tape on the cut (incision) the doctor made, leave the tape on for a week or until it falls off.     · Wash the area daily with water and pat it dry. Don't use hydrogen peroxide or alcohol, which can slow healing. You may cover the area with a gauze bandage if it weeps or rubs against clothing. Change the bandage every day.    Elevation    · Prop up your leg on a pillow anytime you sit or lie down during the next 3 days. Try to keep it above the level of your heart. This will help reduce swelling. Follow-up care is a key part of your treatment and safety. Be sure to make and go to all appointments, and call your doctor if you are having problems. It's also a good idea to know your test results and keep a list of the medicines you take. When should you call for help? Call 911 anytime you think you may need emergency care. For example, call if:    · You passed out (lost consciousness).     · You have trouble breathing.    Call your doctor now or seek immediate medical care if:    · You have severe pain in your leg, or it becomes cold, pale, blue, tingly, or numb.     · You have pain that does not get better after you take pain medicine.     · You have loose stitches, or your incisions come open.     · You are bleeding a lot from the incisions.     · You have signs of infection, such as:  ? Increased pain, swelling, warmth, or redness. ? Red streaks leading from the incision. ? Pus draining from the incision. ?  A fever.     · You are sick to your stomach or cannot keep fluids down.    Watch closely for any changes in your health, and be sure to contact your doctor if:    · You do not get better as expected. Where can you learn more? Go to http://rolanda-leighann.info/. Enter H134 in the search box to learn more about \"Femoral-Tibial Bypass Surgery: What to Expect at Home. \"  Current as of: December 6, 2017  Content Version: 11.8  © [id number] [employer]. Care instructions adapted under license by [employer] (which disclaims liability or warranty for this information). If you have questions about a medical condition or this instruction, always ask your healthcare professional. [address] any warranty or liability for your use of this information.       ___________________________________________________________________________________________________________________________________

## 2019-01-01 NOTE — PROGRESS NOTES
Vascular Surgery Progress Note Admit Date: 2018 POD 10 Days Post-Op Procedure/Surgery:  Procedure(s): 
left FEMORAL below knee to POPLITEAL BYPASS Subjective:  
Some surgical site discomfort but ambulating in room. Temp last pm 100 - temp curve decreasing. Pt reports continued productive cough. Continued L toe numbness and left leg feels like a maniquin leg. Post operatively on pressors for several days and received PRBC's  Now stable on surgical floor. Up with physical therapy. Objective:  
 
Visit Vitals /83 (BP 1 Location: Left arm, BP Patient Position: Sitting) Pulse 97 Temp 98.4 °F (36.9 °C) Resp 18 Ht 5' 5\" (1.651 m) Wt 78.7 kg (173 lb 8 oz) SpO2 100% BMI 28.87 kg/m² Temp (24hrs), Av.3 °F (36.8 °C), Min:97.8 °F (36.6 °C), Max:98.8 °F (37.1 °C) Physical Exam: 
GEN: A&Ox3, NAD, comfortable. HEENT:PERRL EOMI, non icteric, moist membranes. NECK: no JVD, supple LUNG: coarse breath sounds clearing with cough. Unlabored breathing. HEART: regular ABD: soft, NT 
EXT: R upper thigh and L thigh/knee incisions all intact/supple, no erythema or drainage and nontender. Minimal edema. Softly palp L PT. Pulses? . L foot warm perfused. Labs:  
Recent Results (from the past 24 hour(s)) CBC W/O DIFF Collection Time: 18  6:00 AM  
Result Value Ref Range WBC 5.2 4.6 - 13.2 K/uL  
 RBC 3.49 (L) 4.20 - 5.30 M/uL HGB 8.3 (L) 12.0 - 16.0 g/dL HCT 26.2 (L) 35.0 - 45.0 % MCV 75.1 74.0 - 97.0 FL  
 MCH 23.8 (L) 24.0 - 34.0 PG  
 MCHC 31.7 31.0 - 37.0 g/dL  
 RDW 19.8 (H) 11.6 - 14.5 % PLATELET 019 932 - 200 K/uL MPV 10.9 9.2 - 89.5 FL  
METABOLIC PANEL, BASIC Collection Time: 18  6:00 AM  
Result Value Ref Range Sodium 139 136 - 145 mmol/L Potassium 3.9 3.5 - 5.5 mmol/L Chloride 109 (H) 100 - 108 mmol/L  
 CO2 24 21 - 32 mmol/L Anion gap 6 3.0 - 18 mmol/L Glucose 86 74 - 99 mg/dL  BUN 8 7.0 - 18 MG/DL  
 Creatinine 0.46 (L) 0.6 - 1.3 MG/DL  
 BUN/Creatinine ratio 17 12 - 20 GFR est AA >60 >60 ml/min/1.73m2 GFR est non-AA >60 >60 ml/min/1.73m2 Calcium 7.7 (L) 8.5 - 10.1 MG/DL  
GLUCOSE, POC Collection Time: 12/31/18  6:24 AM  
Result Value Ref Range Glucose (POC) 77 70 - 110 mg/dL GLUCOSE, POC Collection Time: 12/31/18 11:15 AM  
Result Value Ref Range Glucose (POC) 103 70 - 110 mg/dL GLUCOSE, POC Collection Time: 12/31/18  4:44 PM  
Result Value Ref Range Glucose (POC) 97 70 - 110 mg/dL GLUCOSE, POC Collection Time: 12/31/18  9:19 PM  
Result Value Ref Range Glucose (POC) 97 70 - 110 mg/dL Assessment:  
 
Active Problems: 
  Atherosclerotic PVD with intermittent claudication ([facility] [address] (12/20/2018) Hypokalemia (12/20/2018) CAD (coronary artery disease) (12/20/2018) DM (diabetes mellitus) ([facility] [address] (12/20/2018) HTN (hypertension) (12/20/2018) Depression (12/20/2018) Current smoker (12/20/2018) Anemia (12/22/2018) POD#10 L pop/TPT endarterectomy with L fem BK pop vein bypass (using R upper thigh GSV) - composite bypass. - clinically patent.  
  
Fever improved and less cough. Surgical sites do not appear infected. Monitor. Plan/Recommendations/Medical Decision Making:  
rocephen changed to levaquin po. Continue  Aggressive ICS, ambulate frequently with assistance, Physical therapy. Continue plavix daily Daysi Barnhart. [address], [address] [facility] - [phone] December 31, 2018 10:49 PM

## 2019-01-01 NOTE — PROGRESS NOTES
Bedside and Verbal shift change report given to Lorelee Gosselin, RN (oncoming nurse) by Brian Mckinney RN (offgoing nurse). Report included the following information SBAR, Kardex, Intake/Output and MAR. Encouraged use of ICS, pt demonstrated proper technique. 1015- Complaint of pain 7 of 10 to left leg after ambulating in hallway with therapy. Prn medication given. 1045-Pain reassessment 5 of 10, sitting up in bedside recliner. Call light in reach 1430-Refused flu shoot. Stated she always gets sick from the shot.

## 2019-01-01 NOTE — PROGRESS NOTES
Vascular Surgery Progress Note Admit Date: 2018 POD 11 Days Post-Op Procedure/Surgery:  Procedure(s): 
left FEMORAL below knee to POPLITEAL BYPASS Subjective: Pt with slow decrease LLE pain. Ambulating. No acute events. Objective:  
 
Visit Vitals /81 (BP 1 Location: Left arm, BP Patient Position: At rest) Pulse 87 Temp 99 °F (37.2 °C) Resp 18 Ht 5' 5\" (1.651 m) Wt 78.7 kg (173 lb 8 oz) SpO2 93% BMI 28.87 kg/m² Temp (24hrs), Av.1 °F (37.3 °C), Min:98.4 °F (36.9 °C), Max:100 °F (37.8 °C) Physical Exam: 
GEN: A&Ox3, NAD, comfortable. HEENT:PERRL EOMI, non icteric, moist membranes. NECK: no JVD, supple LUNG: clear b/l and unlabored breathing HEART: regular ABD: soft, NT 
EXT: R thigh vein harvest incision intact w/o erythema/drainage, nontender. LLE - incisions all intact w/o drainage, decreased lower leg edema, softly palpable graft pulse at knee crease, non palpable L DP/PT - but strongly audible L DP/PT doppler signals. NEURO: no focal lateralizing deficits noted. Motor 5/5. Labs:  
Recent Results (from the past 24 hour(s)) GLUCOSE, POC Collection Time: 18  4:44 PM  
Result Value Ref Range Glucose (POC) 97 70 - 110 mg/dL GLUCOSE, POC Collection Time: 18  9:19 PM  
Result Value Ref Range Glucose (POC) 97 70 - 110 mg/dL CBC W/O DIFF Collection Time: 19  4:30 AM  
Result Value Ref Range WBC 6.5 4.6 - 13.2 K/uL  
 RBC 3.80 (L) 4.20 - 5.30 M/uL HGB 9.0 (L) 12.0 - 16.0 g/dL HCT 28.2 (L) 35.0 - 45.0 % MCV 74.2 74.0 - 97.0 FL  
 MCH 23.7 (L) 24.0 - 34.0 PG  
 MCHC 31.9 31.0 - 37.0 g/dL RDW 20.1 (H) 11.6 - 14.5 % PLATELET 365 852 - 329 K/uL MPV 11.3 9.2 - 73.0 FL  
METABOLIC PANEL, BASIC Collection Time: 19  4:30 AM  
Result Value Ref Range Sodium 135 (L) 136 - 145 mmol/L Potassium 4.2 3.5 - 5.5 mmol/L  Chloride 103 100 - 108 mmol/L  
 CO2 25 21 - 32 mmol/L  
 Anion gap 7 3.0 - 18 mmol/L Glucose 87 74 - 99 mg/dL BUN 9 7.0 - 18 MG/DL Creatinine 0.61 0.6 - 1.3 MG/DL  
 BUN/Creatinine ratio 15 12 - 20 GFR est AA >60 >60 ml/min/1.73m2 GFR est non-AA >60 >60 ml/min/1.73m2 Calcium 8.4 (L) 8.5 - 10.1 MG/DL  
GLUCOSE, POC Collection Time: 01/01/19  6:23 AM  
Result Value Ref Range Glucose (POC) 88 70 - 110 mg/dL GLUCOSE, POC Collection Time: 01/01/19 11:23 AM  
Result Value Ref Range Glucose (POC) 106 70 - 110 mg/dL Assessment:  
 
Active Problems: 
  Atherosclerotic PVD with intermittent claudication ([facility] [address] (12/20/2018) Hypokalemia (12/20/2018) CAD (coronary artery disease) (12/20/2018) DM (diabetes mellitus) ([facility] [address] (12/20/2018) HTN (hypertension) (12/20/2018) Depression (12/20/2018) Current smoker (12/20/2018) Anemia (12/22/2018) POD#11 L pop/TPT endarterectomy with L fem BK pop vein bypass (using R upper thigh GSV) - composite bypass.  - clinically patent.  
  
Fever improved, now 100 max on levaquin and cough nearly resovled. Surgical sites do not appear infected. Plan/Recommendations/Medical Decision Making:  
Ok to discharge from vascular stangpoint - would complete course of levaquin. Continue asa 81mg/day and plavix 75mg/day as outpt. Will need percocet as oupt Will f/u in clinic next week for wound eval /bypass check and stable removal.  
D/w pt. Farzad Beckett. [address] [facility], [facility] - [phone] January 1, 2019 
12:26 PM

## 2019-01-01 NOTE — PROGRESS NOTES
Problem: Mobility Impaired (Adult and Pediatric) Goal: *Acute Goals and Plan of Care (Insert Text) Physical Therapy Goals Initiated 12/27/2018 and to be accomplished within 7 days. 1.  Patient will complete all bed mobility with supervision/set-up in order to prepare for EOB/OOB activity. 2.  Patient will perform sit <> stand with minimal assistance/contact guard assist in order to prepare for OOB/gait activity. 3.  Patient will perform bed to chair transfers with minimal assistance/contact guard assist in order to promote mobility and encourage seated activity to progress towards their prior level of function. 4.  Patient will ambulate 15 feet with minimal assistance/contact guard assist using LRAD in order to prepare for safe negotiation of their environment. Outcome: Progressing Towards Goal 
 
PHYSICAL THERAPY: Daily TREATMENT Note INPATIENT: Medicare: Hospital Day: 13 Patient: Janette Freitas (65 y.o. female)    Date: 1/1/2019 Primary Diagnosis: Atherosclerotic PVD with intermittent claudication (HCC) Procedure(s) (LRB): 
left FEMORAL below knee to POPLITEAL BYPASS (Right), 11 Days Post-Op, Precautions: (None) Chart, physical therapy assessment, plan of care and goals were reviewed. PLOF:Independent ASSESSMENT: 
Pt pleasant, cooperative and eager to participate in therapy today. Pt making good progress with gait training; pacing very slow, with decreased knee and toe flexion resulting in asymmetrical swing and toe off. Pt instructed in phases of gait to improve swing and stance on L; instructed in seated stretching to improve knee flexion as well as foot/ankle ROM to improve gait. Progression toward goals: 
      Improving appropriately and progressing toward goals PLAN: 
Patient continues to benefit from skilled intervention to address the above impairments. Continue treatment per established plan of care.  
 
EDUCATION:  
 Education:  Patient was educated on the following topics: LE ROM Barriers to Learning/Limitations: None Compensate with: visual, verbal, tactile, kinesthetic cues/model Discharge Recommendations:  Home Health Further Equipment Recommendations for Discharge:  rolling walker Factors which may impact discharge planning: none SUBJECTIVE:  
Patient stated I am used to being up and on the go.  OBJECTIVE DATA SUMMARY:  
Critical Behavior: 
Neurologic State: Alert Orientation Level: Oriented X4 Cognition: Follows commands G CODE:Mobility [id number] Current  CL= 60-79%   Goal  CJ= 20-39%. The severity rating is based on the Other [facility] Standing Balance Scale 
0: Pt performs 25% or less of standing activity (Max assist) CN, 100% impaired. 1: Pt supports self with upper extremities but requires therapist assistance. Pt performs 25-50% of effort (Mod assist) CM, 80% to <100% impaired. 1+: Pt supports self with upper extremities but requires therapist assistance. Pt performs >50% effort. (Min assist). CL, 60% to <80% impaired. 2: Pt supports self independently with both upper extremities (walker, crutches, parallel bars). CL, 60% to <80% impaired. 2+: Pt support self independently with 1 upper extremity (cane, crutch, 1 parallel bar). CK, 40% to <60% impaired. 3: Pt stands without upper extremity support for up to 30 seconds. CK, 40% to <60% impaired. 3+: Pt stands without upper extremity support for 30 seconds or greater. CJ, 20% to <40% impaired. 4: Pt independently moves and returns center of gravity 1-2 inches in one plane. CJ, 20% to <40% impaired. 4+: Pt independently moves and returns center of gravity 1-2 inches in multiple planes. CI, 1% to <20% impaired. 5: Pt independently moves and returns center of gravity in all planes greater than 2 inches. CH, 0% impaired. Functional Mobility: 
 
 
Functional Status Indep (I) Mod I Super-vision Min A Mod A  
 Max A Total A Assist x2 Verbal cues Additional time Not tested Comments Rolling []  []  [] []    []    []  []  [] [] [] [x] Supine to sit []  []  [] []  []  []  []  [] [] [] [x] Sit to supine []  []  [] []  []  []  []  [] [] [] [x] Sit to stand []  [x]  [] []  []  []  []  [] [x] [x] [] Stand to sit []  [x]  [] []  []  []  []  [] [x] [x] [] Bed to chair transfers []  []  [] []  []  []  []  [] [] [] [x] Balance Good Melodie Lopez Poor Unable Not tested Comments Sitting static [x]  []  []  []  [] Sitting dynamic [x]  []  []  []  []   
Standing static [x]  []  []  []  []   
Standing dynamic [x]  []  []  []  [] With support Mobility/Gait:  
Level of Assistance: Supervision Assistive Device: rolling walker Distance Ambulated: 160 feet Base of Support: center of gravity altered Speed/Kym: slow Step Length: left shortened and right shortened Swing Pattern: left asymmetrical 
Stance: right increased Gait Abnormalities: decreased step clearance and step to gait Stairs:  
Level of Assistance: Not assessed at this time Therapeutic Exercises:  
 
 
 
EXERCISE Sets Reps Active Active Assist  
Passive Self ROM Comments Ankle Pumps    [] [] [] [] Quad Sets/Glut Sets   [] [] [] [] Hamstring Sets   [] [] [] [] Short Arc Quads   [] [] [] [] Heel Slides   [] [] [] [] Straight Leg Raises   [] [] [] [] Hip Abd/Add   [] [] [] [] Long Arc Quads   [] [] [] [] Seated Marching   [] [] [] []   
Standing Marching   [] [] [] [] Seated knee flexion, seated heel/toe raises 2 1 min hold each/ hourly [x] [] [] []   
 
 
Vital Signs Temp: 99 °F (37.2 °C) Pulse (Heart Rate): 87    
BP: 121/81 Resp Rate: 18    
O2 Sat (%): 93 %Pain:Pre treatment pain level:7 Post treatment pain level:7Pain Scale 1: Numeric (0 - 10) Pain Intensity 1: 7 Pain Location 1: Leg 
Pain Orientation 1: Left Pain Description 1: Aching Pain Intervention(s) 1: Medication (see MAR) Activity Tolerance:  
Good After treatment:  
Patient left in no apparent distress sitting up in chair Call bell left within reach Nursing notified Heydi Muñoz PTA Time Calculation: 29 mins

## 2019-01-01 NOTE — PROGRESS NOTES
Problem: Self Care Deficits Care Plan (Adult) Goal: *Acute Goals and Plan of Care (Insert Text) Occupational Therapy Goals Initiated 1/1/2019 within 7 day(s). 1.  Patient will perform grooming tasks while standing with modified independence. 2.  Patient will perform lower body dressing with modified independence utilizing AE, prn. 
3.  Patient will perform functional task in standing for 8 minutes with modified independence and fair+ dynamic standing balance in prep for ADLs. 4.  Patient will perform toilet transfers with modified independence. 5.  Patient will perform all aspects of toileting with modified independence. 6.  Patient will participate in upper extremity therapeutic exercise/activities with modified independence for 8 minutes to maintain BUE strength for functional transfers and ADLs. 7.  Patient will utilize energy conservation techniques during functional activities with minimal verbal cues. Outcome: [address] Occupational THERAPY: Initial Assessment INPATIENT: Medicare: Hospital Day: 13 Patient: Iftikhar Peguero (51 y.o. female)    Date: 1/1/2019 Primary Diagnosis: Atherosclerotic PVD with intermittent claudication (HCC) Procedure(s) (LRB): 
left FEMORAL below knee to POPLITEAL BYPASS (Right), 11 Days Post-Op, Precautions: Falls PLOF: Pt was independent with basic self care tasks and functional mobility PTA. ASSESSMENT:  
Based on the objective data described below, the patient presents with CGA/SBA for bed mobility, functional transfers and participation in ADLs. Pt supine on arrival, agreeable to therapy. Min A/additional time for supine-->sit. Fair sitting balance with pt report of increased pain in LLE (4/10). CGA to stand and maneuver to bathroom. Skilled instruction on RW management in bathroom during ADLs (see functional levels below). Pt needs reinforcement on RW positioning to ensure safety.  Pt left up in chair with needs within reach. /77. 4/10 pain. Recommend HH upon d/c. Recommend 1-2 more visits to ensure independence in LB dressing utilizing AE. Recommendations for the next treatment session: AE for LB dressing Ms. Page Mightsharmaine will benefit from skilled intervention to address the above impairments. Her rehabilitation potential is considered to be Fair. EDUCATION Education:  Patient was educated on the following topics: activity pacing, home safety, LB dressing techniques, RW management/safety Barriers to Learning/Limitations: None Compensate with: visual, verbal, tactile, kinesthetic cues/model PLAN OF CARE:  
Problems:  Decreased ROM, Decreased strength affecting function, Decreased ADL/functioning of activities, Decreased transfer abilities and Decreased activity tolerance Recommendations and Planned Interventions: 
[x]                  Self Care Training                   [x]         Therapeutic Activities [x]                  Functional Mobility Training    []          Cognitive Retraining 
[x]                  Therapeutic Exercises            [x]          Endurance Activities [x]                  Balance Training                     []          Neuromuscular Re-ed []                  Visual/Perceptual Training      [x]       Home Safety Training 
[x]                  Patient Education                    [x]          Family Training/Education []                  Other (comment): Frequency/Duration: Patient will be followed by occupational therapy 1-2 more visits to address goals. Discharge Recommendations: Home Health for home safety eval 
 
Further Equipment Recommendations for Discharge: shower chair SUBJECTIVE:  
Patient stated: \"I take care of my 13year old grandson. \" OBJECTIVE/TREATMENT:  
 
Past Medical History:  
Diagnosis Date  Arthritis  Asthma  Depression  Hypertension  Unspecified sleep apnea Past Surgical History: Procedure Laterality Date  HX  SECTION    
 HX HYSTERECTOMY  HX ORTHOPAEDIC \"plate in R arm\"  HX SHOULDER ARTHROSCOPY Left Eval Complexity: History: LOW Complexity : Brief history review ; Examination: LOW Complexity : 1-3 performance deficits relating to physical, cognitive , or psychosocial skils that result in activity limitations and / or participation restrictions ; Decision Making:LOW Complexity : No comorbidities that affect functional and no verbal or physical assistance needed to complete eval tasks G CODES: Self Care  Current  CJ= 20-39%  Goal  CI= 1-19%. The severity rating is based on the Other Functional Assessment, MMT, ROM Prior Level of Function/Home Situation: Fully active; able to carry on all performance without restriction Lives with Family 
none reported Cognitive/Behavioral Status:  
Neurologic State: alert Orientation: oriented to time, place, person and situation Cognition:   appropriate decision making, appropriate for age attention/concentration, appropriate safety awareness and following commands  follows multi-step complex commands/direction Safety/Judgement: Awareness of environment and Fall prevention ROM: Within normal limits (BUEs) MMT: 4+/5 (BUEs) Coordination: BUEs [facility]) Hand dominance:Right Skin: Intact (BUEs) Edema: None noted (BUEs) Sensation: Intact (BUEs) Vision/Perceptual: normal 
 
 
Functional Status Indep Mod I  
Sup. / 
Set- Up SBA CGA Min Assist  
Mod Assist  
Max assist  
Total Assist  
Assist x2 Additional Time NT Comments Rolling []  []  []  [x]  []    []    []    []  []  []  [x]  [] Supine to sit []  []  []  []  []  [x]  []  []  []  []  []  [] Sit to supine []  []  []  []  []  []  []  []  []  []  []  [x] Sit to stand []  []  []  []  [x]  [x]  []  []  []  []  []  [] Toilet Transfer []  []  [x]  [x]  []  []  []  []  []  []  []  [] Feeding []  [x]  []  []  []  []  []  []  []  []  []  []    
Grooming []  []  [x]  []  []  []  []  []  []  []  []  [] Bathing  []  []  []  [x]  []  []  []  []  []  []  []  []    
UB Dressing  []  []  [x]  []  []  []  []  []  []  []  []  []    
LB Dressing  []  []  []  [x]  []  [x]  []  []  []  []  []  [] Toileting []  []  []  [x]  []  []  []  []  []  []  []  [] Balance Good Jefm John Poor Unable Comments Sitting static []  [x]  []  [] Sitting dynamic []  [x]  []  []    
Standing static []  [x]  []  []    
Standing dynamic []  [x]  []  [] ADL Intervention:  
Supervision for toilet transfer, toileting task & clothing management with skilled instruction on RW Positioning at toilet & at sink (during oral care, UB bathing, LB bathing) for safety. Supervision for bathing & grooming tasks to ensure safety as pt demo's decreased standing balance due to pain in LLE. Pain:  
Pre treatment: 4/10 Post treatment: 4/10 Scale: numeric Activity tolerance:  fair COMMUNICATION/EDUCATION: Pt educated on role of OT, POC and home safety. She verbalized understanding. [x]         Fall prevention education was provided and the patient/caregiver indicated understanding. [x]         Patient/family have participated as able in goal setting and plan of care. []         Patient/family agree to work toward stated goals and plan of care. []         Patient understands intent and goals of therapy, but is neutral about his/her participation The patients plan of care was also discussed with: Physical Therapist, Certified Occupational Therapy Assistant and Registered Nurse. · After treatment position/precautions:  
o Up in chair 
o Call light within reach 
o RN notified Recommendations for nursing: up with assist x1 Thank you for this referral. 
Clemencia Lind MS OTR/L Time Calculation: 23 mins

## 2019-01-01 NOTE — PROGRESS NOTES
Problem: Self Care Deficits Care Plan (Adult) Goal: *Acute Goals and Plan of Care (Insert Text) Occupational Therapy Goals Initiated 1/1/2019 within 7 day(s). 1.  Patient will perform grooming tasks while standing with modified independence. 2.  Patient will perform lower body dressing with modified independence utilizing AE, prn. 
3.  Patient will perform functional task in standing for 8 minutes with modified independence and fair+ dynamic standing balance in prep for ADLs. 4.  Patient will perform toilet transfers with modified independence. 5.  Patient will perform all aspects of toileting with modified independence. 6.  Patient will participate in upper extremity therapeutic exercise/activities with modified independence for 8 minutes to maintain BUE strength for functional transfers and ADLs. 7.  Patient will utilize energy conservation techniques during functional activities with minimal verbal cues. Outcome: Progressing Towards Goal 
Occupational Therapy TREATMENT Patient: Yolis Perez (76 y.o. female) Date: 1/1/2019 Diagnosis: Atherosclerotic PVD with intermittent claudication (HCC) <principal problem not specified> Procedure(s) (LRB): 
left FEMORAL below knee to POPLITEAL BYPASS (Right) 11 Days Post-Op Precautions: (None) Chart, occupational therapy assessment, plan of care, and goals were reviewed. PLOF: Independent ASSESSMENT: 
Pt standing w/RW upon entry. Pt motivated for d/c home. Pt requires min vc's for safety w/RW and functional mobility to bathroom. Pt demonstrates good safety and balance w/toileting ADL and tolerates standing sinkside performing hand hygiene. Pt requires increase time w/use of adaptive equipment and LB dressing tasks, demonstrating good knowledge of functional use. Issued reacher and sock aid. Alerted NSG, Vikram, pt dressed and ready for d/c. EDUCATION Pt educated on use of adaptive equipment w/LB dressing and energy conservation techniques w/ADLs Progression toward goals: 
[x]          Improving appropriately and progressing toward goals 
[]          Improving slowly and progressing toward goals 
[]          Not making progress toward goals and plan of care will be adjusted PLAN: 
Patient continues to benefit from skilled intervention to address the above impairments. Continue treatment per established plan of care. Discharge Recommendations:  Home Health Further Equipment Recommendations for Discharge:  bedside commode and rolling walker SUBJECTIVE:  
Patient stated My bathroom is upstairs.  OBJECTIVE DATA SUMMARY: 
  
G CODES:  Self Care  Current  CI= 1-19%  Goal  CI= 1-19%. The severity rating is based on the Other functional assessmentCognitive/Behavioral Status: 
Neurologic State: Alert Orientation Level: Oriented X4 Cognition: Follows commands Functional Mobility and Transfers for ADLs: 
 Transfers: 
Bed to Chair: Supervision(w/RW) Toilet Transfer : Supervision(w/grab bar) Bathroom Mobility: Supervision/set up(w/RW) Balance: 
Sitting: Intact Standing: Intact; With support ADL Intervention: 
Grooming Washing Hands: Supervision/set-up Upper Body Dressing Assistance Bra: Supervision/set-up Pullover Shirt: Supervision/set-up Lower Body Dressing Assistance Underpants: Stand-by assistance Pants With Button/Zipper: Stand-by assistance Socks: Stand-by assistance Leg Crossed Method Used: No 
Position Performed: Seated in chair Cues: Henry Doshi Adaptive Equipment Used: Sock aid;Reacher Toileting Toileting Assistance: Supervision/set up Bladder Hygiene: Supervision/set-up Clothing Management: Supervision/set-up Pain: 
Pre Treatment:6 Post Treatment:6 Pain Scale 1: Numeric (0 - 10) Pain Intensity 1: 7 Pain Location 1: Leg 
Pain Orientation 1: Left Pain Description 1: Aching Pain Intervention(s) 1: Medication (see MAR) Activity Tolerance:   
Good Please refer to the flowsheet for vital signs taken during this treatment. After treatment:  
[x]  Patient left in no apparent distress sitting up in chair 
[]  Patient left in no apparent distress in bed 
[x]  Call bell left within reach [x]  Nursing notified 
[]  Caregiver present 
[]  Bed alarm activated Lana Bartholomew Time Calculation: 30 mins

## 2019-01-03 ENCOUNTER — HOME HEALTH ADMISSION (OUTPATIENT)
Dept: HOME HEALTH SERVICES | Facility: HOME HEALTH | Age: 54
End: 2019-01-03

## 2019-01-04 ENCOUNTER — HOME CARE VISIT (OUTPATIENT)
Dept: SCHEDULING | Facility: HOME HEALTH | Age: 54
End: 2019-01-04

## 2019-01-04 ENCOUNTER — HOSPITAL ENCOUNTER (EMERGENCY)
Age: 54
Discharge: HOME | End: 2019-01-04
Attending: EMERGENCY MEDICINE
Payer: MEDICARE

## 2019-01-04 ENCOUNTER — HOME CARE VISIT (OUTPATIENT)
Dept: HOME HEALTH SERVICES | Facility: HOME HEALTH | Age: 54
End: 2019-01-04

## 2019-01-04 ENCOUNTER — APPOINTMENT (OUTPATIENT)
Dept: GENERAL RADIOLOGY | Age: 54
End: 2019-01-04
Attending: NURSE PRACTITIONER
Payer: MEDICARE

## 2019-01-04 VITALS
BODY MASS INDEX: 27.49 KG/M2 | OXYGEN SATURATION: 100 % | HEART RATE: 87 BPM | DIASTOLIC BLOOD PRESSURE: 69 MMHG | HEIGHT: 65 IN | RESPIRATION RATE: 16 BRPM | WEIGHT: 165 LBS | SYSTOLIC BLOOD PRESSURE: 108 MMHG | TEMPERATURE: 98.1 F

## 2019-01-04 DIAGNOSIS — R11.2 NON-INTRACTABLE VOMITING WITH NAUSEA, UNSPECIFIED VOMITING TYPE: Primary | ICD-10-CM

## 2019-01-04 DIAGNOSIS — E87.6 HYPOKALEMIA: ICD-10-CM

## 2019-01-04 LAB
ALBUMIN SERPL-MCNC: 3.2 G/DL (ref 3.4–5)
ALBUMIN/GLOB SERPL: 0.6 {RATIO} (ref 0.8–1.7)
ALP SERPL-CCNC: 99 U/L (ref 45–117)
ALT SERPL-CCNC: 44 U/L (ref 13–56)
ANION GAP SERPL CALC-SCNC: 6 MMOL/L (ref 3–18)
APPEARANCE UR: ABNORMAL
AST SERPL-CCNC: 36 U/L (ref 15–37)
BACTERIA SPEC CULT: NORMAL
BACTERIA URNS QL MICRO: ABNORMAL /HPF
BASOPHILS # BLD: 0 K/UL (ref 0–0.1)
BASOPHILS NFR BLD: 0 % (ref 0–2)
BILIRUB SERPL-MCNC: 0.6 MG/DL (ref 0.2–1)
BILIRUB UR QL: ABNORMAL
BNP SERPL-MCNC: 78 PG/ML (ref 0–900)
BUN SERPL-MCNC: 17 MG/DL (ref 7–18)
BUN/CREAT SERPL: 18 (ref 12–20)
CALCIUM SERPL-MCNC: 8.4 MG/DL (ref 8.5–10.1)
CHLORIDE SERPL-SCNC: 108 MMOL/L (ref 100–108)
CK MB CFR SERPL CALC: NORMAL % (ref 0–4)
CK MB SERPL-MCNC: <1 NG/ML (ref 5–25)
CK SERPL-CCNC: 64 U/L (ref 26–192)
CO2 SERPL-SCNC: 26 MMOL/L (ref 21–32)
COLOR UR: ABNORMAL
CREAT SERPL-MCNC: 0.95 MG/DL (ref 0.6–1.3)
DIFFERENTIAL METHOD BLD: ABNORMAL
EOSINOPHIL # BLD: 0.2 K/UL (ref 0–0.4)
EOSINOPHIL NFR BLD: 2 % (ref 0–5)
EPITH CASTS URNS QL MICRO: ABNORMAL /LPF (ref 0–5)
ERYTHROCYTE [DISTWIDTH] IN BLOOD BY AUTOMATED COUNT: 20.3 % (ref 11.6–14.5)
GLOBULIN SER CALC-MCNC: 5.1 G/DL (ref 2–4)
GLUCOSE SERPL-MCNC: 110 MG/DL (ref 74–99)
GLUCOSE UR STRIP.AUTO-MCNC: NEGATIVE MG/DL
HCT VFR BLD AUTO: 31.7 % (ref 35–45)
HGB BLD-MCNC: 10 G/DL (ref 12–16)
HGB UR QL STRIP: NEGATIVE
HYALINE CASTS URNS QL MICRO: ABNORMAL /LPF (ref 0–2)
KETONES UR QL STRIP.AUTO: ABNORMAL MG/DL
LEUKOCYTE ESTERASE UR QL STRIP.AUTO: ABNORMAL
LIPASE SERPL-CCNC: 295 U/L (ref 73–393)
LYMPHOCYTES # BLD: 1.4 K/UL (ref 0.9–3.6)
LYMPHOCYTES NFR BLD: 20 % (ref 21–52)
MCH RBC QN AUTO: 23.9 PG (ref 24–34)
MCHC RBC AUTO-ENTMCNC: 31.5 G/DL (ref 31–37)
MCV RBC AUTO: 75.7 FL (ref 74–97)
MONOCYTES # BLD: 0.5 K/UL (ref 0.05–1.2)
MONOCYTES NFR BLD: 7 % (ref 3–10)
NEUTS SEG # BLD: 4.9 K/UL (ref 1.8–8)
NEUTS SEG NFR BLD: 71 % (ref 40–73)
NITRITE UR QL STRIP.AUTO: NEGATIVE
PH UR STRIP: 5 [PH] (ref 5–8)
PLATELET # BLD AUTO: 435 K/UL (ref 135–420)
PMV BLD AUTO: 10.5 FL (ref 9.2–11.8)
POTASSIUM SERPL-SCNC: 3.3 MMOL/L (ref 3.5–5.5)
PROT SERPL-MCNC: 8.3 G/DL (ref 6.4–8.2)
PROT UR STRIP-MCNC: 100 MG/DL
RBC # BLD AUTO: 4.19 M/UL (ref 4.2–5.3)
RBC #/AREA URNS HPF: ABNORMAL /HPF (ref 0–5)
SERVICE CMNT-IMP: NORMAL
SODIUM SERPL-SCNC: 140 MMOL/L (ref 136–145)
SP GR UR REFRACTOMETRY: 1.03 (ref 1–1.03)
TROPONIN I SERPL-MCNC: <0.02 NG/ML (ref 0–0.04)
UROBILINOGEN UR QL STRIP.AUTO: 1 EU/DL (ref 0.2–1)
WBC # BLD AUTO: 6.9 K/UL (ref 4.6–13.2)
WBC URNS QL MICRO: ABNORMAL /HPF (ref 0–4)

## 2019-01-04 PROCEDURE — 83690 ASSAY OF LIPASE: CPT

## 2019-01-04 PROCEDURE — 85025 COMPLETE CBC W/AUTO DIFF WBC: CPT

## 2019-01-04 PROCEDURE — 96361 HYDRATE IV INFUSION ADD-ON: CPT

## 2019-01-04 PROCEDURE — 81001 URINALYSIS AUTO W/SCOPE: CPT

## 2019-01-04 PROCEDURE — 74011250637 HC RX REV CODE- 250/637: Performed by: NURSE PRACTITIONER

## 2019-01-04 PROCEDURE — 83880 ASSAY OF NATRIURETIC PEPTIDE: CPT

## 2019-01-04 PROCEDURE — 80053 COMPREHEN METABOLIC PANEL: CPT

## 2019-01-04 PROCEDURE — 99284 EMERGENCY DEPT VISIT MOD MDM: CPT

## 2019-01-04 PROCEDURE — 74011250636 HC RX REV CODE- 250/636: Performed by: EMERGENCY MEDICINE

## 2019-01-04 PROCEDURE — 84484 ASSAY OF TROPONIN QUANT: CPT

## 2019-01-04 PROCEDURE — 96374 THER/PROPH/DIAG INJ IV PUSH: CPT

## 2019-01-04 PROCEDURE — 74011250636 HC RX REV CODE- 250/636: Performed by: NURSE PRACTITIONER

## 2019-01-04 PROCEDURE — 74022 RADEX COMPL AQT ABD SERIES: CPT

## 2019-01-04 RX ORDER — ONDANSETRON 4 MG/1
4 TABLET, ORALLY DISINTEGRATING ORAL
Qty: 10 TAB | Refills: 0 | Status: SHIPPED | OUTPATIENT
Start: 2019-01-04 | End: 2019-08-06

## 2019-01-04 RX ORDER — ONDANSETRON 2 MG/ML
4 INJECTION INTRAMUSCULAR; INTRAVENOUS
Status: COMPLETED | OUTPATIENT
Start: 2019-01-04 | End: 2019-01-04

## 2019-01-04 RX ORDER — ONDANSETRON 4 MG/1
4 TABLET, ORALLY DISINTEGRATING ORAL
Status: DISCONTINUED | OUTPATIENT
Start: 2019-01-04 | End: 2019-01-04

## 2019-01-04 RX ORDER — POTASSIUM CHLORIDE 20 MEQ/1
40 TABLET, EXTENDED RELEASE ORAL
Status: COMPLETED | OUTPATIENT
Start: 2019-01-04 | End: 2019-01-04

## 2019-01-04 RX ADMIN — ONDANSETRON 4 MG: 2 INJECTION INTRAMUSCULAR; INTRAVENOUS at 17:01

## 2019-01-04 RX ADMIN — SODIUM CHLORIDE 1000 ML: 900 INJECTION, SOLUTION INTRAVENOUS at 17:00

## 2019-01-04 RX ADMIN — POTASSIUM CHLORIDE 40 MEQ: 20 TABLET, EXTENDED RELEASE ORAL at 18:46

## 2019-01-04 NOTE — ED NOTES
Doppler used to assess pulses on bilateral extremities. Both present for strong and regular popliteal, pedal, and dorsalis pedis pulses.

## 2019-01-04 NOTE — ED TRIAGE NOTES
Recent discharge from hospital after having bypass done on leg. States since got home, has not kept anything down, been vomiting. States feels dehydrated, muscle spasms in left hand.

## 2019-01-04 NOTE — ED PROVIDER NOTES
Joanie Soliz is a 48year old female who presetns to the ED with a c/o N&V and weakness. States she has had N&V since arriving back home from the hospital.  Pt was discharged on 18 from the hospital for a below the knee femoral-popliteal bypass that had been occluded. Adds that she has been unable to keep anything down. Past Medical History:  
Diagnosis Date  Arthritis  Asthma  Depression  Hypertension  Unspecified sleep apnea Past Surgical History:  
Procedure Laterality Date  HX  SECTION    
 HX HYSTERECTOMY  HX ORTHOPAEDIC \"plate in R arm\"  HX SHOULDER ARTHROSCOPY Left Family History:  
Problem Relation Age of Onset  Diabetes Mother  Hypertension Mother  Heart Disease Mother Social History Socioeconomic History  Marital status: LEGALLY  Spouse name: Not on file  Number of children: Not on file  Years of education: Not on file  Highest education level: Not on file Social Needs  Financial resource strain: Not on file  Food insecurity - worry: Not on file  Food insecurity - inability: Not on file  Transportation needs - medical: Not on file  Transportation needs - non-medical: Not on file Occupational History  Not on file Tobacco Use  Smoking status: Current Every Day Smoker Packs/day: 0.25  Smokeless tobacco: Current User Substance and Sexual Activity  Alcohol use: No  
 Drug use: No  
 Sexual activity: Not on file Other Topics Concern  Not on file Social History Narrative  Not on file ALLERGIES: Patient has no known allergies. Review of Systems Constitutional: Negative. HENT: Negative. Eyes: Negative. Respiratory: Negative. Cardiovascular: Negative. Gastrointestinal: Positive for nausea and vomiting. Endocrine: Negative. Genitourinary: Negative. Musculoskeletal: Negative. Skin: Negative. Allergic/Immunologic: Negative. Neurological: Negative. Psychiatric/Behavioral: Negative. Vitals:  
 01/04/19 1324 BP: 117/90 Pulse: (!) 105 Resp: 16 Temp: 98.1 °F (36.7 °C) SpO2: 100% Weight: 74.8 kg (165 lb) Height: 5' 5\" (1.651 m) Physical Exam  
Constitutional: She appears well-developed and well-nourished. No distress. HENT:  
Head: Normocephalic and atraumatic. Nose: Nose normal.  
Eyes: EOM are normal. Right eye exhibits no discharge. Left eye exhibits no discharge. No scleral icterus. Neck: Normal range of motion. Neck supple. No tracheal deviation present. Cardiovascular: Normal rate, regular rhythm and intact distal pulses. Pulmonary/Chest: Effort normal and breath sounds normal.  
Abdominal: Soft. She exhibits no distension. Genitourinary:  
Genitourinary Comments: NE 
  
Musculoskeletal: She exhibits no deformity. Neurological: She is alert. Coordination normal.  
Skin: Skin is warm and dry. NE. The surgical site appears well kept. There is no evidence of erythema, edema or dehiscence. No bleeding. The site is understandably, TTP. Psychiatric: She has a normal mood and affect. Her behavior is normal.  
Nursing note and vitals reviewed. MDM Number of Diagnoses or Management Options Diagnosis management comments: CONSULT:  Spoke with Dr Elizabeth Colmenares from Vascular. He is in the hospital, and will stop by and see the Pt. Charmayne Countryman, NP  6:18 PM 
 
CONSULT:  Discussed the Pt with Dr Elizabeth Colmenares, who is at the bedside. He evaluated the left lower extremity. Charmayne Countryman, NP  6:53 PM 
 
MDM:  Will DC the Pt to home for N&V. Likely viral.   Charmayne Countryman, NP  6:53 PM 
 
  
 
 
  
Amount and/or Complexity of Data Reviewed Clinical lab tests: ordered and reviewed Risk of Complications, Morbidity, and/or Mortality Presenting problems: low Diagnostic procedures: low Management options: low Patient Progress Patient progress: stable Procedures Diagnosis: 1. Non-intractable vomiting with nausea, unspecified vomiting type 2. Hypokalemia Disposition:   Discharged to Home. Follow-up Information Follow up With Specialties Details Why Contact Info Angelica Morris MD Family Practice Call in the morning to arrange follow up    [address] [place] [address] [address] [place] [phone] Medication List  
  
START taking these medications   
ondansetron 4 mg disintegrating tablet Commonly known as:  ZOFRAN ODT Take 1 Tab by mouth every eight (8) hours as needed for Nausea. ASK your doctor about these medications   
albuterol 90 mcg/actuation inhaler Commonly known as:  PROVENTIL HFA, VENTOLIN HFA, PROAIR HFA AMBIEN 10 mg tablet Generic drug:  zolpidem 
  
amLODIPine 2.5 mg tablet Commonly known as:  NORVASC 
  
aspirin 81 mg chewable tablet 
  
clonazePAM 1 mg tablet Commonly known as:  KlonoPIN 
  
clopidogrel 75 mg Tab Commonly known as:  PLAVIX Take 1 Tab by mouth daily. levoFLOXacin 750 mg tablet Commonly known as:  Vane Patrick Take 1 Tab by mouth every twenty-four (24) hours. LIPITOR 20 mg tablet Generic drug:  atorvastatin 
  
losartan 50 mg tablet Commonly known as:  COZAAR 
  
NEURONTIN 300 mg capsule Generic drug:  gabapentin 
  
nitroglycerin 0.4 mg SL tablet Commonly known as:  NITROSTAT 
  
* oxyCODONE-acetaminophen 5-325 mg per tablet Commonly known as:  PERCOCET Take 1-2 tablets by mouth every six (6) hours as needed for Pain. * oxyCODONE-acetaminophen 5-325 mg per tablet Commonly known as:  PERCOCET Take 1-2 Tabs by mouth every six (6) hours as needed. Max Daily Amount: 8 Tabs. SEROquel  mg sr tablet Generic drug:  QUEtiapine SR 
  
TRADJENTA 5 mg tablet Generic drug:  linagliptin 
  
vortioxetine 10 mg tablet Commonly known as:  TRINTELLIX WELLBUTRIN 100 mg tablet Generic drug:  buPROPion * This list has 2 medication(s) that are the same as other medications prescribed for you. Read the directions carefully, and ask your doctor or other care provider to review them with you. Where to Get Your Medications Information about where to get these medications is not yet available Ask your nurse or doctor about these medications · ondansetron 4 mg disintegrating tablet

## 2019-01-04 NOTE — DISCHARGE INSTRUCTIONS
Patient Education        Nausea and Vomiting: Care Instructions  Your Care Instructions    When you are nauseated, you may feel weak and sweaty and notice a lot of saliva in your mouth. Nausea often leads to vomiting. Most of the time you do not need to worry about nausea and vomiting, but they can be signs of other illnesses. Two common causes of nausea and vomiting are stomach flu and food poisoning. Nausea and vomiting from viral stomach flu will usually start to improve within 24 hours. Nausea and vomiting from food poisoning may last from 12 to 48 hours. The doctor has checked you carefully, but problems can develop later. If you notice any problems or new symptoms, get medical treatment right away. Follow-up care is a key part of your treatment and safety. Be sure to make and go to all appointments, and call your doctor if you are having problems. It's also a good idea to know your test results and keep a list of the medicines you take. How can you care for yourself at home? · To prevent dehydration, drink plenty of fluids, enough so that your urine is light yellow or clear like water. Choose water and other caffeine-free clear liquids until you feel better. If you have kidney, heart, or liver disease and have to limit fluids, talk with your doctor before you increase the amount of fluids you drink. · Rest in bed until you feel better. · When you are able to eat, try clear soups, mild foods, and liquids until all symptoms are gone for 12 to 48 hours. Other good choices include dry toast, crackers, cooked cereal, and gelatin dessert, such as Jell-O. When should you call for help? Call 911 anytime you think you may need emergency care. For example, call if:    · You passed out (lost consciousness).    Call your doctor now or seek immediate medical care if:    · You have symptoms of dehydration, such as:  ? Dry eyes and a dry mouth. ? Passing only a little dark urine. ?  Feeling thirstier than usual.   · You have new or worsening belly pain.     · You have a new or higher fever.     · You vomit blood or what looks like coffee grounds.    Watch closely for changes in your health, and be sure to contact your doctor if:    · You have ongoing nausea and vomiting.     · Your vomiting is getting worse.     · Your vomiting lasts longer than 2 days.     · You are not getting better as expected. Where can you learn more? Go to http://rolanda-leighann.info/. Enter [phone] [id number] in the search box to learn more about \"Nausea and Vomiting: Care Instructions. \"  Current as of: 2017  Content Version: 11.8  © 7232-8358 [employer]. Care instructions adapted under license by [employer] (which disclaims liability or warranty for this information). If you have questions about a medical condition or this instruction, always ask your healthcare professional. [address] any warranty or liability for your use of this information. [employer] Activation    Thank you for requesting access to [employer]. Please follow the instructions below to securely access and download your online medical record. [employer] allows you to send messages to your doctor, view your test results, renew your prescriptions, schedule appointments, and more. How Do I Sign Up? 1. In your internet browser, go to [email or web address]  2. Click on the First Time User? Click Here link in the Sign In box. You will be redirect to the New Member Sign Up page. 3. Enter your [employer] Access Code exactly as it appears below. You will not need to use this code after youve completed the sign-up process. If you do not sign up before the expiration date, you must request a new code. [employer] Access Code: [email or web address]  Expires: 3/20/2019  1:18 PM (This is the date your [employer] access code will )    4.  Enter the last four digits of your Social Security Number (xxxx) and Date of Birth (lachelle/anuradha/yyyy) as indicated and click Submit. You will be taken to the next sign-up page. 5. Create a [employer] ID. This will be your [employer] login ID and cannot be changed, so think of one that is secure and easy to remember. 6. Create a [employer] password. You can change your password at any time. 7. Enter your Password Reset Question and Answer. This can be used at a later time if you forget your password. 8. Enter your e-mail address. You will receive e-mail notification when new information is available in [address] 9. Click Sign Up. You can now view and download portions of your medical record. 10. Click the Download Summary menu link to download a portable copy of your medical information. Additional Information    If you have questions, please visit the Frequently Asked Questions section of the [employer] website at [email or web address] Remember, [employer] is NOT to be used for urgent needs. For medical emergencies, dial 911.

## 2019-01-05 VITALS
HEART RATE: 118 BPM | SYSTOLIC BLOOD PRESSURE: 78 MMHG | DIASTOLIC BLOOD PRESSURE: 58 MMHG | TEMPERATURE: 97.9 F | OXYGEN SATURATION: 98 %

## 2019-01-05 NOTE — DISCHARGE SUMMARY
[id number]  MR#: [id number]  : 1965  ACCOUNT #: [de-identified]   ADMIT DATE: 2019  DISCHARGE DATE: 2019    ADMITTING DIAGNOSIS:  Leg pain from peripheral arterial disease. HISTORY OF PRESENT ILLNESS:  The patient is a 53-year-old female who presented to the emergency department with pain in her left leg. This had been worsening at home, but had acutely worsened for several days prior to admission. In the emergency department, she was found to have exacerbation of peripheral arterial disease. She was admitted for ongoing management. HOSPITAL COURSE:  Vascular surgery consultation was obtained as well as cardiology consultation. The patient is known to have diffuse arterial disease. This includes peripheral arterial disease as well as coronary artery disease. There was no evidence for acute coronary syndrome during hospitalization. Her medications were optimized. Ultimately, she went on to have left popliteal and tibial endarterectomy with patch angioplasty. The procedure also included femoral to below knee bypass with PTFE graft. She tolerated this relatively well. She was treated conservatively after surgery. She was in the ICU for a period of time after her procedure, but she was able to transfer out of the ICU efficiently. She continued with pain control and blood pressure control out of the ICU. She continued to mobilize and her status improved. By 2019 she was considered ready for discharge and discharged home. DISCHARGE DIAGNOSES:  1. Peripheral arterial disease, status post left popliteal and tibial endarterectomy with patch angioplasty as well as left below-knee bypass with PTFE graft. 2.  Asthma. 3.  Hypertension. 4.  Hyperlipidemia. 5.  Coronary artery disease. 6.  Peripheral neuropathy. Her condition is improved. Activity is at ad rony.     FOLLOWUP:  With her primary care provider in 1 week.      Patient will arrange medications at the time discharge are:    DISCHARGE MEDICATIONS:  1. Albuterol 2 puffs by inhaler every 8 hours as needed for wheezing. 2.  Ambien 10 mg by mouth nightly as needed for insomnia. 3.  Norvasc 2.5 mg by mouth daily. 4.  Aspirin 81 mg by mouth daily. 5.  Clonazepam 1 mg by mouth nightly as needed. 6.  Plavix 75 mg by mouth daily. 7.  Levaquin 750 mg by mouth daily. 8.  Lipitor 20 mg by mouth daily. 9.  Cozaar 50 mg by mouth daily. 10.  Neurontin 300 mg by mouth 3 times daily. 11.  Nitroglycerin 0.4 mg sublingually as needed for chest pain. 12.  Percocet 5/325 one tablet by mouth every 6 hours as needed for pain. 13.  Seroquel 300 mg by mouth nightly. 14.  Tradjenta 5 mg via home regimen. 15.  Trintellix 10 mg by home regimen. 16.  Wellbutrin 100 mg by mouth via home regimen. Her diet is diabetic. FOLLOWUP:  Is also with vascular surgery who will call to set up an appointment. DISPOSITION:  Home. MD ALE Rodriguez/JIMBO  D: 01/04/2019 15:17     T: 01/05/2019 10:54  JOB #: [id number]  CC: Constance Fletcher MD

## 2019-06-24 ENCOUNTER — HOSPITAL ENCOUNTER (OUTPATIENT)
Dept: NUCLEAR MEDICINE | Age: 54
Discharge: HOME | End: 2019-06-24
Attending: SURGERY
Payer: MEDICARE

## 2019-06-24 ENCOUNTER — HOSPITAL ENCOUNTER (OUTPATIENT)
Dept: NON INVASIVE DIAGNOSTICS | Age: 54
Discharge: HOME | End: 2019-06-24
Attending: SURGERY
Payer: MEDICARE

## 2019-06-24 VITALS
BODY MASS INDEX: 27.41 KG/M2 | WEIGHT: 164.5 LBS | SYSTOLIC BLOOD PRESSURE: 153 MMHG | HEIGHT: 65 IN | DIASTOLIC BLOOD PRESSURE: 75 MMHG

## 2019-06-24 DIAGNOSIS — Z95.5 STENTED CORONARY ARTERY: ICD-10-CM

## 2019-06-24 LAB
STRESS BASELINE HR: 71 BPM
STRESS ESTIMATED WORKLOAD: 1.4 METS
STRESS EXERCISE DUR MIN: NORMAL
STRESS PEAK DIAS BP: 80 MMHG
STRESS PEAK SYS BP: 154 MMHG
STRESS PERCENT HR ACHIEVED: 77 %
STRESS POST PEAK HR: 129 BPM
STRESS RATE PRESSURE PRODUCT: NORMAL BPM*MMHG
STRESS TARGET HR: 168 BPM

## 2019-06-24 PROCEDURE — 74011250636 HC RX REV CODE- 250/636: Performed by: SURGERY

## 2019-06-24 PROCEDURE — A9500 TC99M SESTAMIBI: HCPCS

## 2019-06-24 PROCEDURE — 93017 CV STRESS TEST TRACING ONLY: CPT

## 2019-06-24 RX ADMIN — REGADENOSON 0.4 MG: 0.08 INJECTION, SOLUTION INTRAVENOUS at 10:42

## 2019-08-06 ENCOUNTER — HOSPITAL ENCOUNTER (OUTPATIENT)
Dept: PREADMISSION TESTING | Age: 54
Discharge: HOME | End: 2019-08-06
Payer: MEDICARE

## 2019-08-06 ENCOUNTER — OFFICE VISIT (OUTPATIENT)
Dept: CARDIOLOGY CLINIC | Age: 54
End: 2019-08-06

## 2019-08-06 VITALS
OXYGEN SATURATION: 97 % | HEIGHT: 65 IN | WEIGHT: 169 LBS | BODY MASS INDEX: 28.16 KG/M2 | SYSTOLIC BLOOD PRESSURE: 141 MMHG | HEART RATE: 77 BPM | DIASTOLIC BLOOD PRESSURE: 92 MMHG

## 2019-08-06 DIAGNOSIS — R94.39 ABNORMAL STRESS TEST: ICD-10-CM

## 2019-08-06 DIAGNOSIS — I25.119 CORONARY ARTERY DISEASE WITH ANGINA PECTORIS, UNSPECIFIED VESSEL OR LESION TYPE, UNSPECIFIED WHETHER NATIVE OR TRANSPLANTED HEART (HCC): Chronic | ICD-10-CM

## 2019-08-06 DIAGNOSIS — R06.09 DOE (DYSPNEA ON EXERTION): Primary | ICD-10-CM

## 2019-08-06 LAB
ALBUMIN SERPL-MCNC: 3.7 G/DL (ref 3.4–5)
ALBUMIN/GLOB SERPL: 0.9 {RATIO} (ref 0.8–1.7)
ALP SERPL-CCNC: 94 U/L (ref 45–117)
ALT SERPL-CCNC: 21 U/L (ref 13–56)
ANION GAP SERPL CALC-SCNC: 7 MMOL/L (ref 3–18)
AST SERPL-CCNC: 19 U/L (ref 10–38)
BASOPHILS # BLD: 0 K/UL (ref 0–0.1)
BASOPHILS NFR BLD: 0 % (ref 0–2)
BILIRUB SERPL-MCNC: 0.5 MG/DL (ref 0.2–1)
BUN SERPL-MCNC: 8 MG/DL (ref 7–18)
BUN/CREAT SERPL: 11 (ref 12–20)
CALCIUM SERPL-MCNC: 8.7 MG/DL (ref 8.5–10.1)
CHLORIDE SERPL-SCNC: 106 MMOL/L (ref 100–111)
CO2 SERPL-SCNC: 30 MMOL/L (ref 21–32)
CREAT SERPL-MCNC: 0.75 MG/DL (ref 0.6–1.3)
DIFFERENTIAL METHOD BLD: ABNORMAL
EOSINOPHIL # BLD: 0.1 K/UL (ref 0–0.4)
EOSINOPHIL NFR BLD: 2 % (ref 0–5)
ERYTHROCYTE [DISTWIDTH] IN BLOOD BY AUTOMATED COUNT: 16 % (ref 11.6–14.5)
GLOBULIN SER CALC-MCNC: 4.3 G/DL (ref 2–4)
GLUCOSE SERPL-MCNC: 126 MG/DL (ref 74–99)
HCT VFR BLD AUTO: 37.4 % (ref 35–45)
HGB BLD-MCNC: 11.2 G/DL (ref 12–16)
INR PPP: 1 (ref 0.8–1.2)
LYMPHOCYTES # BLD: 1.6 K/UL (ref 0.9–3.6)
LYMPHOCYTES NFR BLD: 32 % (ref 21–52)
MCH RBC QN AUTO: 20.8 PG (ref 24–34)
MCHC RBC AUTO-ENTMCNC: 29.9 G/DL (ref 31–37)
MCV RBC AUTO: 69.5 FL (ref 74–97)
MONOCYTES # BLD: 0.4 K/UL (ref 0.05–1.2)
MONOCYTES NFR BLD: 9 % (ref 3–10)
NEUTS SEG # BLD: 2.9 K/UL (ref 1.8–8)
NEUTS SEG NFR BLD: 57 % (ref 40–73)
PLATELET # BLD AUTO: 273 K/UL (ref 135–420)
PMV BLD AUTO: 10.9 FL (ref 9.2–11.8)
POTASSIUM SERPL-SCNC: 3.3 MMOL/L (ref 3.5–5.5)
PROT SERPL-MCNC: 8 G/DL (ref 6.4–8.2)
PROTHROMBIN TIME: 13.3 SEC (ref 11.5–15.2)
RBC # BLD AUTO: 5.38 M/UL (ref 4.2–5.3)
SODIUM SERPL-SCNC: 143 MMOL/L (ref 136–145)
WBC # BLD AUTO: 5 K/UL (ref 4.6–13.2)

## 2019-08-06 PROCEDURE — 85610 PROTHROMBIN TIME: CPT

## 2019-08-06 PROCEDURE — 80053 COMPREHEN METABOLIC PANEL: CPT

## 2019-08-06 PROCEDURE — 85025 COMPLETE CBC W/AUTO DIFF WBC: CPT

## 2019-08-06 PROCEDURE — 36415 COLL VENOUS BLD VENIPUNCTURE: CPT

## 2019-08-06 RX ORDER — SODIUM CHLORIDE 0.9 % (FLUSH) 0.9 %
5-40 SYRINGE (ML) INJECTION AS NEEDED
Status: CANCELLED | OUTPATIENT
Start: 2019-08-06

## 2019-08-06 RX ORDER — SODIUM CHLORIDE 9 MG/ML
1000 INJECTION, SOLUTION INTRAVENOUS CONTINUOUS
Status: CANCELLED | OUTPATIENT
Start: 2019-08-06

## 2019-08-06 RX ORDER — TRAMADOL HYDROCHLORIDE AND ACETAMINOPHEN 37.5; 325 MG/1; MG/1
1-2 TABLET ORAL
COMMUNITY
Start: 2017-08-16 | End: 2019-11-06

## 2019-08-06 RX ORDER — BUPROPION HYDROCHLORIDE 150 MG/1
150 TABLET, EXTENDED RELEASE ORAL DAILY
Status: ON HOLD | COMMUNITY
End: 2021-05-25 | Stop reason: DRUGHIGH

## 2019-08-06 RX ORDER — CARVEDILOL 6.25 MG/1
TABLET ORAL 2 TIMES DAILY WITH MEALS
COMMUNITY
End: 2019-09-24 | Stop reason: SDUPTHER

## 2019-08-06 RX ORDER — SODIUM CHLORIDE 0.9 % (FLUSH) 0.9 %
5-40 SYRINGE (ML) INJECTION EVERY 8 HOURS
Status: CANCELLED | OUTPATIENT
Start: 2019-08-06

## 2019-08-06 RX ORDER — ASPIRIN 325 MG
162 TABLET ORAL DAILY
Status: CANCELLED | OUTPATIENT
Start: 2019-08-06

## 2019-08-06 RX ORDER — ATORVASTATIN CALCIUM 80 MG/1
80 TABLET, FILM COATED ORAL DAILY
COMMUNITY
End: 2020-06-24 | Stop reason: ALTCHOICE

## 2019-08-06 NOTE — PROGRESS NOTES
1. Have you been to the ER, urgent care clinic since your last visit? Hospitalized since your last visit? No    2. Have you seen or consulted any other health care providers outside of the [address] [place] [address] since your last visit? Include any pap smears or colon screening. No

## 2019-08-06 NOTE — PROGRESS NOTES
[facility]    Ms. Radha Patel is 56-year-old female with history of coronary artery disease, coronary stent, peripheral artery disease, tobacco abuse disorder and other medical problem    Patient was asked to come see me for evaluation of abnormal stress test.  Patient has history of coronary artery disease status post LAD coronary stenting . She also has peripheral artery disease with claudication symptoms. She was planned to undergo high risk lower extremity vascular surgery however she was found to have abnormal stress test so she was asked to come see me. She tells me that she cannot walk more than 50 to 60 feet without having claudication symptoms especially on the left side. She is limited in her functional status because of that. She had nitroglycerin use of 4 to 5 months ago. However her claudication symptoms are functionally limiting her to perform any sort of activity. She denies any lower extremity swelling. She denies any palpitation, presyncope or syncope. She is using 1-2 pillows at night. She is taking her cardiac medications regularly. Denies any nausea, vomiting, abdominal pain, fever, chills, sputum production.  No hematuria or other bleeding complaints    Past Medical History:   Diagnosis Date    Arthritis     Asthma     CAD (coronary artery disease)     S/P LAD PCI with 2.25 x 38 mm and 2.5 x 12 mm SYNERGY (2016)    Cardiomyopathy [facility]     Depression     Diabetes ([facility]     Hypertension     [facility]AD [facility]peripheral artery [facility]) [facility]     S/P LE angioplasty and stent, LLE bypass    Tobacco abuse     Unspecified sleep apnea          Past Surgical History:   Procedure Laterality Date    HX  SECTION      HX HYSTERECTOMY      HX ORTHOPAEDIC      \"plate in R arm\"    HX SHOULDER ARTHROSCOPY Left        Current Outpatient Medications   Medication Sig    traMADol-acetaminophen (ULTRACET) 37.5-325 mg per tablet Take 1-2 Tabs by mouth.  atorvastatin (LIPITOR) 80 mg tablet Take 80 mg by mouth daily.  carvedilol (COREG) 6.25 mg tablet Take  by mouth two (2) times daily (with meals).  buPROPion SR (WELLBUTRIN SR) 150 mg SR tablet Take  by mouth two (2) times a day.  QUEtiapine (SEROQUEL) 50 mg tablet Take 50 mg by mouth daily.  isosorbide mononitrate ER (IMDUR) 60 mg CR tablet Take 60 mg by mouth daily.  clopidogrel (PLAVIX) 75 mg tab Take 1 Tab by mouth daily.  gabapentin (NEURONTIN) 300 mg capsule Take 300 mg by mouth three (3) times daily.  losartan (COZAAR) 50 mg tablet Take  by mouth daily.  aspirin 81 mg chewable tablet 81 mg.    linagliptin (TRADJENTA) 5 mg PO tablet 5 mg.  clonazePAM (KLONOPIN) 1 mg tablet Take 1 mg by mouth two (2) times a day.  amLODIPine (NORVASC) 2.5 mg tablet 2.5 mg.    nitroglycerin (NITROSTAT) 0.4 mg SL tablet 0.4 mg.    albuterol (PROVENTIL HFA, VENTOLIN HFA) 90 mcg/actuation inhaler Take 2 Puffs by inhalation every four (4) hours as needed for Shortness of Breath or Wheezing. No current facility-administered medications for this visit. Allergies and Sensitivities:  No Known Allergies    Family History:  Family History   Problem Relation Age of Onset    Diabetes Mother     Hypertension Mother     Heart Disease Mother        Social History:  Social History     Tobacco Use    Smoking status: Current Every Day Smoker     Packs/day: 0.25    Smokeless tobacco: Current User   Substance Use Topics    Alcohol use: No    Drug use: No     She  reports that she has been smoking. She has been smoking about 0.25 packs per day. She uses smokeless tobacco.  She  reports that she does not drink alcohol. Review of Systems:  Cardiac symptoms as noted above in HPI. All others negative.   Denies fatigue, malaise, skin rash, joint pain, blurring vision, photophobia, neck pain, hemoptysis, chronic cough, nausea, vomiting, hematuria, burning micturition, BRBPR, chronic headaches. Physical Exam:  BP Readings from Last 3 Encounters:   08/06/19 (!) 141/92   06/24/19 153/75   01/04/19 108/69         Pulse Readings from Last 3 Encounters:   08/06/19 77   01/04/19 87   01/04/19 (!) 118          Wt Readings from Last 3 Encounters:   08/06/19 169 lb (76.7 kg)   06/24/19 164 lb 8 oz (74.6 kg)   01/04/19 165 lb (74.8 kg)       Constitutional: Oriented to person, place, and time. HENT: Head: Normocephalic and atraumatic. Eyes: Conjunctivae and extraocular motions are normal.   Neck: No JVD present. Carotid bruit is not appreciated. Cardiovascular: Regular rhythm. No murmur, gallop or rubs appreciated  Lung: Breath sounds normal. No respiratory distress. No ronchi or rales appreciated  Abdominal: No tenderness. No rebound and no guarding. Musculoskeletal: There is no lower extremity edema. No cynosis  Lymphadenopathy:  No cervical or supraclavicular adenopathy appriciated. Neurological: No gross motor deficit noted. Skin: No visible skin rash noted. No Ear discharge noted  Psychiatric: Normal mood and affect. Review of Data  LABS:   Lab Results   Component Value Date/Time    Sodium 140 01/04/2019 02:14 PM    Potassium 3.3 (L) 01/04/2019 02:14 PM    Chloride 108 01/04/2019 02:14 PM    CO2 26 01/04/2019 02:14 PM    Glucose 110 (H) 01/04/2019 02:14 PM    BUN 17 01/04/2019 02:14 PM    Creatinine 0.95 01/04/2019 02:14 PM     No flowsheet data found. Lab Results   Component Value Date/Time    ALT (SGPT) 44 01/04/2019 02:14 PM     Lab Results   Component Value Date/Time    Hemoglobin A1c 5.9 (H) 12/26/2018 03:15 AM       EKG    ECHO    STRESS TEST (07/19)  · Baseline ECG: Normal sinus rhythm, non-specific ST-T wave abnormalities. · Gated SPECT: Left ventricular function post-stress was normal. Calculated ejection fraction is 59%.   · Left ventricular perfusion is abnormal.  · Myocardial perfusion imaging defect 1: There is a defect that is moderate in size present in the apical to mid anterior and apex location(s) that is reversible. The defect appears to be ischemia. · Positive myocardial perfusion imaging. Myocardial perfusion imaging supports an intermediate risk stress test.    CATHETERIZATION  (2017)  Dominance: right  Left  Main: no significant disease   Left Anterior Descending Artery: 30% mid irregularities, patent overlapping Synergy Drug Eluting Stent in late mid and distal LAD , 70% apical LAD stenosis in region of small vessel caliber best   suited for medical therapy  Ramus Intermedius Artery : small 40% diffuse irregularities  Left Circumflex Artery: nondominant with 40% irregularities , no significant disease   Right Coronary Artery: moderate dominant vessel with 30% mid stenosis, no significant disease     IMPRESSION & PLAN:  Patient is 29-year-old female with coronary artery disease, coronary stent, peripheral artery disease, diabetes, hypertension and other multiple medical problem    Coronary artery disease:  Patient had anterior wall myocardial infarction in 2016 requiring to LAD stent. She is currently taking aspirin and Plavix. She has functionally limiting claudication symptoms from peripheral artery disease. She does not report anginal symptoms however she had a nuclear stress test last month which showed anterior wall reversible defect concerning for ischemia. Continue with dual antiplatelet agent, amlodipine, Coreg. Recommend left heart catheterization prior to considering high risk vascular surgery because of abnormal stress test.  DW patient regarding management strategy which includes medical management vs. Ischemia evaluation ( non-invasive vs. Invasive). Risk, benefit and alternatives of each strategy discussed in detail.   Risk, benefit, complication of LHC and possible PCI ( including but not limited to bleeding, vascular trauma requiring surgery,  infection, heart failure, stroke, MI, emergent bypass surgery, severe allergic reactions, kidney failure, dialysis and death ) were discussed with patient and willing to proceed with procedure. Will be using moderate sedation    Hypertension:  Blood pressure 140/92 Grant mercury. She has been on appropriate medication I recommend to continue same. Will order echocardiogram to rule out hypertensive cardia vascular heart disease in setting of abnormal stress test    Hyperlipidemia: This is being managed by primary care provider. Currently she is on high intensity atorvastatin 80 mg daily. Continue same    Diabetes:  Goal hemoglobin A1c less than 7 is recommended from cardia vascular standpoint. Defer management to PCP    PAD:  Status post multiple lower extremity angioplasty and stenting. Also had left lower extremity bypass surgery. Planning to have another lower extremity surgery  Deferred vascular team    Preoperative evaluation; She has functionally limiting claudication symptoms from peripheral artery disease. She does not report anginal symptoms however she had a nuclear stress test last month which showed anterior wall reversible defect concerning for ischemia. Continue with dual antiplatelet agent, amlodipine, Coreg. Recommend left heart catheterization prior to considering high risk vascular surgery because of abnormal stress test.  DW patient regarding management strategy which includes medical management vs. Ischemia evaluation ( non-invasive vs. Invasive). Risk, benefit and alternatives of each strategy discussed in detail. Risk, benefit, complication of LHC and possible PCI ( including but not limited to bleeding, vascular trauma requiring surgery,  infection, heart failure, stroke, MI, emergent bypass surgery, severe allergic reactions, kidney failure, dialysis and death ) were discussed with patient and willing to proceed with procedure. Will be using moderate sedation    This plan was discussed with patient who is in agreement.     Thank you for allowing me to participate in patient care. Please feel free to call me if you have any question or concern. Mia Trimble MD  Please note: This document has been produced using voice recognition software. Unrecognized errors in transcription may be present.

## 2019-08-06 NOTE — PATIENT INSTRUCTIONS
*Pre -procedure LABWORK is to be done within one week of your procedure date    **YOU WILL NEED SOMEONE TO DRIVE YOU HOME AFTER PROCEDURE. 1. You are scheduled to have a left heart catherization on  August 16, 2019  at 10 amPlease check in at 8 am.     2. Please go to [facility] and park in the ER Parking Lot. Once you enter check in with the  there. The  will either give you directions or assist you in getting to the cath holding area. 4. You are not to eat or drink anything after midnight the night prior to procedure. You may take a sip of water to take morning medications. If you are diabetic, Do Not take your insulin/sugar pill the morning of the procedure. 5. MEDICATION INSTRUCTIONS:   Please take your morning medications with the following special instructions:    [x]          Please make sure to take your Blood pressure medication. [x]          Take your Aspirin and/or Plavix. 6. We encourage families to wait in the waiting room on the first floor while the procedure is being done. The Doctor will come out and talk with you as soon as the procedure is over. 7. There is the possibility that you may spend the night in the hospital, depending on the results of the procedure. This will be determined after the procedure is done. If angioplasty or stent is planned, you will stay at least one day. 8. If you or your family have any questions, please call our office Monday -Friday, 9:00 a.m.-4:30 p.m.,  At [phone], and ask to speak to one of the nurses. 9. Be sure you have someone to drive you home, you will not be able to drive after the procedure. Patient verbalized understanding of the above instructions and was advised to call office with any further questions or concerns at [phone].

## 2019-08-10 ENCOUNTER — HOSPITAL ENCOUNTER (OUTPATIENT)
Dept: NON INVASIVE DIAGNOSTICS | Age: 54
Discharge: HOME | End: 2019-08-10
Attending: INTERNAL MEDICINE
Payer: MEDICARE

## 2019-08-10 VITALS
SYSTOLIC BLOOD PRESSURE: 141 MMHG | HEIGHT: 65 IN | WEIGHT: 169 LBS | BODY MASS INDEX: 28.16 KG/M2 | DIASTOLIC BLOOD PRESSURE: 92 MMHG

## 2019-08-10 DIAGNOSIS — R06.09 DOE (DYSPNEA ON EXERTION): ICD-10-CM

## 2019-08-10 PROCEDURE — 93306 TTE W/DOPPLER COMPLETE: CPT

## 2019-08-16 ENCOUNTER — HOSPITAL ENCOUNTER (OUTPATIENT)
Age: 54
Discharge: HOME | End: 2019-08-17
Attending: INTERNAL MEDICINE | Admitting: INTERNAL MEDICINE
Payer: MEDICARE

## 2019-08-16 DIAGNOSIS — R94.39 ABNORMAL NUCLEAR STRESS TEST: ICD-10-CM

## 2019-08-16 DIAGNOSIS — I25.10 CAD (CORONARY ARTERY DISEASE): ICD-10-CM

## 2019-08-16 PROBLEM — Z98.890 STATUS POST LEFT HEART CATHETERIZATION (LHC): Status: ACTIVE | Noted: 2019-08-16

## 2019-08-16 LAB — GLUCOSE BLD STRIP.AUTO-MCNC: 113 MG/DL (ref 70–110)

## 2019-08-16 PROCEDURE — C1725 CATH, TRANSLUMIN NON-LASER: HCPCS | Performed by: INTERNAL MEDICINE

## 2019-08-16 PROCEDURE — C1894 INTRO/SHEATH, NON-LASER: HCPCS | Performed by: INTERNAL MEDICINE

## 2019-08-16 PROCEDURE — 77030003629 HC NDL PERC VASC COOK -A: Performed by: INTERNAL MEDICINE

## 2019-08-16 PROCEDURE — 77030012597: Performed by: INTERNAL MEDICINE

## 2019-08-16 PROCEDURE — 74011000250 HC RX REV CODE- 250: Performed by: INTERNAL MEDICINE

## 2019-08-16 PROCEDURE — 77030013761 HC KT HRT LFT ANGI -B: Performed by: INTERNAL MEDICINE

## 2019-08-16 PROCEDURE — C1887 CATHETER, GUIDING: HCPCS | Performed by: INTERNAL MEDICINE

## 2019-08-16 PROCEDURE — 93458 L HRT ARTERY/VENTRICLE ANGIO: CPT | Performed by: INTERNAL MEDICINE

## 2019-08-16 PROCEDURE — 77030016699 HC CATH ANGI DX INFN1 CARD -A: Performed by: INTERNAL MEDICINE

## 2019-08-16 PROCEDURE — 74011250636 HC RX REV CODE- 250/636

## 2019-08-16 PROCEDURE — C1876 STENT, NON-COA/NON-COV W/DEL: HCPCS | Performed by: INTERNAL MEDICINE

## 2019-08-16 PROCEDURE — 74011250637 HC RX REV CODE- 250/637: Performed by: INTERNAL MEDICINE

## 2019-08-16 PROCEDURE — 99152 MOD SED SAME PHYS/QHP 5/>YRS: CPT | Performed by: INTERNAL MEDICINE

## 2019-08-16 PROCEDURE — 77030013519 HC DEV INFL BASIX MRTM -B: Performed by: INTERNAL MEDICINE

## 2019-08-16 PROCEDURE — 82962 GLUCOSE BLOOD TEST: CPT

## 2019-08-16 PROCEDURE — 77030013797 HC KT TRNSDUC PRSSR EDWD -A: Performed by: INTERNAL MEDICINE

## 2019-08-16 PROCEDURE — C1760 CLOSURE DEV, VASC: HCPCS | Performed by: INTERNAL MEDICINE

## 2019-08-16 PROCEDURE — 74011250636 HC RX REV CODE- 250/636: Performed by: INTERNAL MEDICINE

## 2019-08-16 PROCEDURE — 77030015766: Performed by: INTERNAL MEDICINE

## 2019-08-16 PROCEDURE — 77030012468 HC VLV BLEEDBK CNTRL ABBT -B: Performed by: INTERNAL MEDICINE

## 2019-08-16 PROCEDURE — 77030029997 HC DEV COM RDL R BND TELE -B: Performed by: INTERNAL MEDICINE

## 2019-08-16 PROCEDURE — 74011636320 HC RX REV CODE- 636/320: Performed by: INTERNAL MEDICINE

## 2019-08-16 PROCEDURE — C1769 GUIDE WIRE: HCPCS | Performed by: INTERNAL MEDICINE

## 2019-08-16 PROCEDURE — 76210000005 HC OR PH I REC 5 TO 5.5 HR: Performed by: INTERNAL MEDICINE

## 2019-08-16 PROCEDURE — 85347 COAGULATION TIME ACTIVATED: CPT

## 2019-08-16 PROCEDURE — 99153 MOD SED SAME PHYS/QHP EA: CPT | Performed by: INTERNAL MEDICINE

## 2019-08-16 PROCEDURE — 92928 PRQ TCAT PLMT NTRAC ST 1 LES: CPT | Performed by: INTERNAL MEDICINE

## 2019-08-16 PROCEDURE — 93005 ELECTROCARDIOGRAM TRACING: CPT

## 2019-08-16 DEVICE — MULTI-LINK MINI VISION CORONARY STENT AND STENT DELIVERY SYSTEM 2.25 MM X 18 MM / RAPID-EXCHANGE
Type: IMPLANTABLE DEVICE | Status: FUNCTIONAL
Brand: MULTI-LINK MINI VISION

## 2019-08-16 RX ORDER — SODIUM CHLORIDE 9 MG/ML
10 INJECTION INTRAMUSCULAR; INTRAVENOUS; SUBCUTANEOUS
Status: DISCONTINUED | OUTPATIENT
Start: 2019-08-16 | End: 2019-08-17

## 2019-08-16 RX ORDER — HEPARIN SODIUM 200 [USP'U]/100ML
INJECTION, SOLUTION INTRAVENOUS AS NEEDED
Status: DISCONTINUED | OUTPATIENT
Start: 2019-08-16 | End: 2019-08-16 | Stop reason: HOSPADM

## 2019-08-16 RX ORDER — SODIUM CHLORIDE 0.9 % (FLUSH) 0.9 %
5-40 SYRINGE (ML) INJECTION EVERY 8 HOURS
Status: DISCONTINUED | OUTPATIENT
Start: 2019-08-16 | End: 2019-08-16

## 2019-08-16 RX ORDER — CEFAZOLIN SODIUM 1 G/3ML
INJECTION, POWDER, FOR SOLUTION INTRAMUSCULAR; INTRAVENOUS AS NEEDED
Status: DISCONTINUED | OUTPATIENT
Start: 2019-08-16 | End: 2019-08-16 | Stop reason: HOSPADM

## 2019-08-16 RX ORDER — SODIUM CHLORIDE 9 MG/ML
1000 INJECTION, SOLUTION INTRAVENOUS CONTINUOUS
Status: DISCONTINUED | OUTPATIENT
Start: 2019-08-16 | End: 2019-08-16 | Stop reason: HOSPADM

## 2019-08-16 RX ORDER — HYDRALAZINE HYDROCHLORIDE 20 MG/ML
20 INJECTION INTRAMUSCULAR; INTRAVENOUS
Status: ACTIVE | OUTPATIENT
Start: 2019-08-16 | End: 2019-08-16

## 2019-08-16 RX ORDER — LIDOCAINE HYDROCHLORIDE 10 MG/ML
INJECTION, SOLUTION EPIDURAL; INFILTRATION; INTRACAUDAL; PERINEURAL AS NEEDED
Status: DISCONTINUED | OUTPATIENT
Start: 2019-08-16 | End: 2019-08-16 | Stop reason: HOSPADM

## 2019-08-16 RX ORDER — ACETAMINOPHEN 325 MG/1
650 TABLET ORAL
Status: DISCONTINUED | OUTPATIENT
Start: 2019-08-16 | End: 2019-08-16

## 2019-08-16 RX ORDER — LOSARTAN POTASSIUM 25 MG/1
50 TABLET ORAL DAILY
Status: DISCONTINUED | OUTPATIENT
Start: 2019-08-17 | End: 2019-08-17 | Stop reason: HOSPADM

## 2019-08-16 RX ORDER — GUAIFENESIN 100 MG/5ML
81 LIQUID (ML) ORAL DAILY
Status: DISCONTINUED | OUTPATIENT
Start: 2019-08-17 | End: 2019-08-17 | Stop reason: HOSPADM

## 2019-08-16 RX ORDER — HYDRALAZINE HYDROCHLORIDE 20 MG/ML
INJECTION INTRAMUSCULAR; INTRAVENOUS AS NEEDED
Status: DISCONTINUED | OUTPATIENT
Start: 2019-08-16 | End: 2019-08-16 | Stop reason: HOSPADM

## 2019-08-16 RX ORDER — GUAIFENESIN 100 MG/5ML
162 LIQUID (ML) ORAL DAILY
Status: DISCONTINUED | OUTPATIENT
Start: 2019-08-16 | End: 2019-08-16

## 2019-08-16 RX ORDER — HEPARIN SODIUM 1000 [USP'U]/ML
INJECTION, SOLUTION INTRAVENOUS; SUBCUTANEOUS AS NEEDED
Status: DISCONTINUED | OUTPATIENT
Start: 2019-08-16 | End: 2019-08-16 | Stop reason: HOSPADM

## 2019-08-16 RX ORDER — ISOSORBIDE MONONITRATE 30 MG/1
60 TABLET, EXTENDED RELEASE ORAL DAILY
Status: DISCONTINUED | OUTPATIENT
Start: 2019-08-17 | End: 2019-08-17 | Stop reason: HOSPADM

## 2019-08-16 RX ORDER — NITROGLYCERIN 400 UG/1
SPRAY ORAL AS NEEDED
Status: DISCONTINUED | OUTPATIENT
Start: 2019-08-16 | End: 2019-08-16 | Stop reason: HOSPADM

## 2019-08-16 RX ORDER — SODIUM CHLORIDE 0.9 % (FLUSH) 0.9 %
5-40 SYRINGE (ML) INJECTION EVERY 8 HOURS
Status: DISCONTINUED | OUTPATIENT
Start: 2019-08-16 | End: 2019-08-16 | Stop reason: HOSPADM

## 2019-08-16 RX ORDER — FENTANYL CITRATE 50 UG/ML
INJECTION, SOLUTION INTRAMUSCULAR; INTRAVENOUS AS NEEDED
Status: DISCONTINUED | OUTPATIENT
Start: 2019-08-16 | End: 2019-08-16 | Stop reason: HOSPADM

## 2019-08-16 RX ORDER — ATROPINE SULFATE 0.4 MG/ML
0.5 INJECTION, SOLUTION ENDOTRACHEAL; INTRAMEDULLARY; INTRAMUSCULAR; INTRAVENOUS; SUBCUTANEOUS AS NEEDED
Status: DISCONTINUED | OUTPATIENT
Start: 2019-08-16 | End: 2019-08-17 | Stop reason: HOSPADM

## 2019-08-16 RX ORDER — SODIUM CHLORIDE 0.9 % (FLUSH) 0.9 %
5-40 SYRINGE (ML) INJECTION AS NEEDED
Status: DISCONTINUED | OUTPATIENT
Start: 2019-08-16 | End: 2019-08-16

## 2019-08-16 RX ORDER — AMLODIPINE BESYLATE 5 MG/1
2.5 TABLET ORAL DAILY
Status: DISCONTINUED | OUTPATIENT
Start: 2019-08-17 | End: 2019-08-17 | Stop reason: HOSPADM

## 2019-08-16 RX ORDER — ATORVASTATIN CALCIUM 40 MG/1
80 TABLET, FILM COATED ORAL
Status: DISCONTINUED | OUTPATIENT
Start: 2019-08-16 | End: 2019-08-17 | Stop reason: HOSPADM

## 2019-08-16 RX ORDER — SODIUM CHLORIDE 0.9 % (FLUSH) 0.9 %
5-40 SYRINGE (ML) INJECTION AS NEEDED
Status: DISCONTINUED | OUTPATIENT
Start: 2019-08-16 | End: 2019-08-16 | Stop reason: HOSPADM

## 2019-08-16 RX ORDER — HYDROCODONE BITARTRATE AND ACETAMINOPHEN 5; 325 MG/1; MG/1
1 TABLET ORAL
Status: DISCONTINUED | OUTPATIENT
Start: 2019-08-16 | End: 2019-08-17 | Stop reason: HOSPADM

## 2019-08-16 RX ORDER — ONDANSETRON 2 MG/ML
4 INJECTION INTRAMUSCULAR; INTRAVENOUS
Status: DISCONTINUED | OUTPATIENT
Start: 2019-08-16 | End: 2019-08-17 | Stop reason: HOSPADM

## 2019-08-16 RX ORDER — SODIUM CHLORIDE 9 MG/ML
50 INJECTION, SOLUTION INTRAVENOUS CONTINUOUS
Status: DISPENSED | OUTPATIENT
Start: 2019-08-16 | End: 2019-08-16

## 2019-08-16 RX ORDER — CARVEDILOL 6.25 MG/1
6.25 TABLET ORAL 2 TIMES DAILY WITH MEALS
Status: DISCONTINUED | OUTPATIENT
Start: 2019-08-16 | End: 2019-08-17 | Stop reason: HOSPADM

## 2019-08-16 RX ORDER — MIDAZOLAM HYDROCHLORIDE 1 MG/ML
INJECTION, SOLUTION INTRAMUSCULAR; INTRAVENOUS AS NEEDED
Status: DISCONTINUED | OUTPATIENT
Start: 2019-08-16 | End: 2019-08-16 | Stop reason: HOSPADM

## 2019-08-16 RX ORDER — NITROGLYCERIN 0.4 MG/1
0.4 TABLET SUBLINGUAL
Status: DISCONTINUED | OUTPATIENT
Start: 2019-08-16 | End: 2019-08-17 | Stop reason: HOSPADM

## 2019-08-16 RX ADMIN — SODIUM CHLORIDE 50 ML/HR: 900 INJECTION, SOLUTION INTRAVENOUS at 12:37

## 2019-08-16 RX ADMIN — ASPIRIN 81 MG 162 MG: 81 TABLET ORAL at 08:41

## 2019-08-16 RX ADMIN — CARVEDILOL 6.25 MG: 6.25 TABLET, FILM COATED ORAL at 18:10

## 2019-08-16 RX ADMIN — TICAGRELOR 90 MG: 90 TABLET ORAL at 21:58

## 2019-08-16 RX ADMIN — SODIUM CHLORIDE 500 ML: 900 INJECTION, SOLUTION INTRAVENOUS at 08:41

## 2019-08-16 RX ADMIN — HYDROCODONE BITARTRATE AND ACETAMINOPHEN 1 TABLET: 5; 325 TABLET ORAL at 15:12

## 2019-08-16 RX ADMIN — SODIUM CHLORIDE 50 ML/HR: 900 INJECTION, SOLUTION INTRAVENOUS at 18:40

## 2019-08-16 NOTE — Clinical Note
Sheath #2: sheath exchanged for [facility] AVNT + [id number] -- TOMMY+ - ORDER AS EA. Hemostasis achieved.

## 2019-08-16 NOTE — Clinical Note
Wet prep solution applied at: 952. Wet prep solution dried at: 955. Wet prep elapsed drying time: 3 mins.

## 2019-08-16 NOTE — PERIOP NOTES
Recd care of pt from cath lab via stretcher. Resp even and unlabored. Attached to monitor. VSS - blood pressure slightly low. Right radial TR band noted. Right groin dsg CDI. Will cont to monitor. 1145  EKG performed. Dr. Joann Tristan at bedside. Pt still c/o slight chest burning. Will cont to monitor. 1600  TR Band removed. 4x4, transparent pressure dsg applied. Awaiting transfer to pt room. 1648  TRANSFER - OUT REPORT:    Verbal report given to MARTIN Michel (name) on Grcae Dies  being transferred to [facility] (unit) for routine progression of care       Report consisted of patients Situation, Background, Assessment and   Recommendations(SBAR). Information from the following report(s) SBAR, Procedure Summary, Intake/Output, MAR and Cardiac Rhythm sinus rhythm/sinus tachy was reviewed with the receiving nurse. Lines:   Peripheral IV 08/16/19 Left Antecubital (Active)   Site Assessment Clean, dry, & intact 8/16/2019  8:40 AM   Phlebitis Assessment 0 8/16/2019  8:40 AM   Infiltration Assessment 0 8/16/2019  8:40 AM   Dressing Status Clean, dry, & intact 8/16/2019  8:40 AM   Dressing Type Transparent;Tape 8/16/2019  8:40 AM   Hub Color/Line Status Infusing;Pink 8/16/2019  8:40 AM       Saline Lock 08/16/19 Left Hand (Active)   Site Assessment Clean, dry, & intact 8/16/2019  8:40 AM   Phlebitis Assessment 0 8/16/2019  8:40 AM   Infiltration Assessment 0 8/16/2019  8:40 AM   Dressing Status Clean, dry, & intact 8/16/2019  8:40 AM   Dressing Type Transparent;Tape 8/16/2019  8:40 AM   Hub Color/Line Status Pink 8/16/2019  8:40 AM        Opportunity for questions and clarification was provided.       Patient transported with:   Monitor  Registered Nurse  Tech

## 2019-08-16 NOTE — PERIOP NOTES
Pre-Op Summary    Pt arrived via car with family/friend and is oriented to time, place, person and situation. Patient with steady gait with none assistive devices. Visit Vitals  /77 (BP 1 Location: Left arm, BP Patient Position: At rest)   Pulse (!) 59   Temp 98.7 °F (37.1 °C)   Resp 16   Ht 5' 5\" (1.651 m)   Wt 76.2 kg (168 lb)   SpO2 98%   BMI 27.96 kg/m²       Peripheral IV located on Left antecubital .    Patients belongings are located with mom. Patient's point of contact is mom- Elvira Koch  and their contact number is: [phone]. They will be in the waiting room. They are able to receive medication information. They will be their ride home.

## 2019-08-16 NOTE — Clinical Note
Lesion: Located in the Distal Cx. Stent deployed. Single technique used. First inflation pressure = 9 jermaine; inflation time: 21 sec.

## 2019-08-16 NOTE — Clinical Note
Lesion located in the Distal Cx. Balloon inflated using multiple inflations inflation technique. Pressure = 16 jermaine; Duration = 15 sec. Inflation 2: Pressure: 16 jermaine; Duration: 16 sec.

## 2019-08-16 NOTE — PROGRESS NOTES
1641: TRANSFER - IN REPORT:    Verbal report received from Cathy (name) on Maribel Heaton  being received from PACU(unit) for routine post - op      Report consisted of patients Situation, Background, Assessment and   Recommendations(SBAR). Information from the following report(s) SBAR, Kardex, Procedure Summary, Intake/Output, MAR and Cardiac Rhythm SR was reviewed with the receiving nurse. Opportunity for questions and clarification was provided. Assessment completed upon patients arrival to unit and care assumed. 1935: Bedside shift change report given to Christopher Almanza  (oncoming nurse) by [place] RN  (offgoing nurse).  Report included the following information SBAR, Kardex, Procedure Summary, Intake/Output, MAR and Cardiac Rhythm SR.

## 2019-08-16 NOTE — H&P
Please see clinic note from 08 as below for detail. I saw and examined patient and confirmed above. No interval change. Labs reviewed. Procedure explained to patient and all risk and benefit discussed with patient. Risk, benefit, complication of LHC and possible PCI ( including but not limited to bleeding, infection, heart failure, stroke, MI, emergent bypass surgery, kidney failure, dialysis and death ) were discussed with patient and mother and  willing to proceed with procedure. Proceed as planned. History and physical has been reviewed. There have been no significant clinical changes since the completion of the originally dated History and Physical.  Will be using moderate sedation.    ------------------------------------------------------------------------------------------------------------------      Ms. Crystal Flynn is 53-year-old female with history of coronary artery disease, coronary stent, peripheral artery disease, tobacco abuse disorder and other medical problem     Patient was asked to come see me for evaluation of abnormal stress test.  Patient has history of coronary artery disease status post LAD coronary stenting 2016. She also has peripheral artery disease with claudication symptoms. She was planned to undergo high risk lower extremity vascular surgery however she was found to have abnormal stress test so she was asked to come see me. She tells me that she cannot walk more than 50 to 60 feet without having claudication symptoms especially on the left side. She is limited in her functional status because of that. She had nitroglycerin use of 4 to 5 months ago. However her claudication symptoms are functionally limiting her to perform any sort of activity. She denies any lower extremity swelling. She denies any palpitation, presyncope or syncope. She is using 12 pillows at night. She is taking her cardiac medications regularly.   Denies any nausea, vomiting, abdominal pain, fever, chills, sputum production. No hematuria or other bleeding complaints          Past Medical History:   Diagnosis Date    Arthritis      Asthma      CAD (coronary artery disease)       S/P LAD PCI with 2.25 x 38 mm and 2.5 x 12 mm SYNERGY (2016)    Cardiomyopathy ([facility] [address]      Depression      Diabetes ([facility] [address]      Hypertension      PAD [facility]peripheral artery disease) ([facility])       S/P LE angioplasty and stent, LLE bypass    Tobacco abuse      Unspecified sleep apnea                    Past Surgical History:   Procedure Laterality Date    HX  SECTION        HX HYSTERECTOMY        HX ORTHOPAEDIC         \"plate in R arm\"    HX SHOULDER ARTHROSCOPY Left                Current Outpatient Medications   Medication Sig    traMADol-acetaminophen (ULTRACET) 37.5-325 mg per tablet Take 1-2 Tabs by mouth.  atorvastatin (LIPITOR) 80 mg tablet Take 80 mg by mouth daily.  carvedilol (COREG) 6.25 mg tablet Take  by mouth two (2) times daily (with meals).  buPROPion SR (WELLBUTRIN SR) 150 mg SR tablet Take  by mouth two (2) times a day.  QUEtiapine (SEROQUEL) 50 mg tablet Take 50 mg by mouth daily.  isosorbide mononitrate ER (IMDUR) 60 mg CR tablet Take 60 mg by mouth daily.  clopidogrel (PLAVIX) 75 mg tab Take 1 Tab by mouth daily.  gabapentin (NEURONTIN) 300 mg capsule Take 300 mg by mouth three (3) times daily.  losartan (COZAAR) 50 mg tablet Take  by mouth daily.  aspirin 81 mg chewable tablet 81 mg.    linagliptin (TRADJENTA) 5 mg PO tablet 5 mg.  clonazePAM (KLONOPIN) 1 mg tablet Take 1 mg by mouth two (2) times a day.     amLODIPine (NORVASC) 2.5 mg tablet 2.5 mg.    nitroglycerin (NITROSTAT) 0.4 mg SL tablet 0.4 mg.    albuterol (PROVENTIL HFA, VENTOLIN HFA) 90 mcg/actuation inhaler Take 2 Puffs by inhalation every four (4) hours as needed for Shortness of Breath or Wheezing.      No current facility-administered medications for this visit.          Allergies and Sensitivities:  No Known Allergies     Family History:        Family History   Problem Relation Age of Onset    Diabetes Mother      Hypertension Mother      Heart Disease Mother           Social History:  Social History            Tobacco Use    Smoking status: Current Every Day Smoker       Packs/day: 0.25    Smokeless tobacco: Current User   Substance Use Topics    Alcohol use: No    Drug use: No      She  reports that she has been smoking. She has been smoking about 0.25 packs per day. She uses smokeless tobacco.  She  reports that she does not drink alcohol.     Review of Systems:  Cardiac symptoms as noted above in HPI. All others negative. Denies fatigue, malaise, skin rash, joint pain, blurring vision, photophobia, neck pain, hemoptysis, chronic cough, nausea, vomiting, hematuria, burning micturition, BRBPR, chronic headaches.     Physical Exam:      BP Readings from Last 3 Encounters:   08/06/19 (!) 141/92   06/24/19 153/75   01/04/19 108/69                                  Pulse Readings from Last 3 Encounters:   08/06/19 77   01/04/19 87   01/04/19 (!) 118                                              Wt Readings from Last 3 Encounters:   08/06/19 169 lb (76.7 kg)   06/24/19 164 lb 8 oz (74.6 kg)   01/04/19 165 lb (74.8 kg)         Constitutional: Oriented to person, place, and time. HENT: Head: Normocephalic and atraumatic. Eyes: Conjunctivae and extraocular motions are normal.   Neck: No JVD present. Carotid bruit is not appreciated. Cardiovascular: Regular rhythm. No murmur, gallop or rubs appreciated  Lung: Breath sounds normal. No respiratory distress. No ronchi or rales appreciated  Abdominal: No tenderness. No rebound and no guarding. Musculoskeletal: There is no lower extremity edema. No cynosis  Lymphadenopathy:  No cervical or supraclavicular adenopathy appriciated. Neurological: No gross motor deficit noted. Skin: No visible skin rash noted.    No Ear discharge noted  Psychiatric: Normal mood and affect.      Review of Data  LABS:         Lab Results   Component Value Date/Time     Sodium 140 01/04/2019 02:14 PM     Potassium 3.3 (L) 01/04/2019 02:14 PM     Chloride 108 01/04/2019 02:14 PM     CO2 26 01/04/2019 02:14 PM     Glucose 110 (H) 01/04/2019 02:14 PM     BUN 17 01/04/2019 02:14 PM     Creatinine 0.95 01/04/2019 02:14 PM      No flowsheet data found. Lab Results   Component Value Date/Time     ALT (SGPT) 44 01/04/2019 02:14 PM            Lab Results   Component Value Date/Time     Hemoglobin A1c 5.9 (H) 12/26/2018 03:15 AM         EKG     ECHO     STRESS TEST (07/19)  · Baseline ECG: Normal sinus rhythm, non-specific ST-T wave abnormalities. · Gated SPECT: Left ventricular function post-stress was normal. Calculated ejection fraction is 59%. · Left ventricular perfusion is abnormal.  · Myocardial perfusion imaging defect 1: There is a defect that is moderate in size present in the apical to mid anterior and apex location(s) that is reversible. The defect appears to be ischemia. · Positive myocardial perfusion imaging.  Myocardial perfusion imaging supports an intermediate risk stress test.     CATHETERIZATION  (2017)  Dominance: right  Left  Main: no significant disease   Left Anterior Descending Artery: 30% mid irregularities, patent overlapping Synergy Drug Eluting Stent in late mid and distal LAD , 70% apical LAD stenosis in region of small vessel caliber best   suited for medical therapy  Ramus Intermedius Artery : small 40% diffuse irregularities  Left Circumflex Artery: nondominant with 40% irregularities , no significant disease   Right Coronary Artery: moderate dominant vessel with 30% mid stenosis, no significant disease      IMPRESSION & PLAN:  Patient is 65-year-old female with coronary artery disease, coronary stent, peripheral artery disease, diabetes, hypertension and other multiple medical problem     Coronary artery disease:  Patient had anterior wall myocardial infarction in 2016 requiring to LAD stent. She is currently taking aspirin and Plavix. She has functionally limiting claudication symptoms from peripheral artery disease. She does not report anginal symptoms however she had a nuclear stress test last month which showed anterior wall reversible defect concerning for ischemia. Continue with dual antiplatelet agent, amlodipine, Coreg. Recommend left heart catheterization prior to considering high risk vascular surgery because of abnormal stress test.  DW patient regarding management strategy which includes medical management vs. Ischemia evaluation ( non-invasive vs. Invasive). Risk, benefit and alternatives of each strategy discussed in detail. Risk, benefit, complication of LHC and possible PCI ( including but not limited to bleeding, vascular trauma requiring surgery,  infection, heart failure, stroke, MI, emergent bypass surgery, severe allergic reactions, kidney failure, dialysis and death ) were discussed with patient and willing to proceed with procedure. Will be using moderate sedation     Hypertension:  Blood pressure 140/92 Grant mercury. She has been on appropriate medication I recommend to continue same. Will order echocardiogram to rule out hypertensive cardia vascular heart disease in setting of abnormal stress test     Hyperlipidemia: This is being managed by primary care provider. Currently she is on high intensity atorvastatin 80 mg daily. Continue same     Diabetes:  Goal hemoglobin A1c less than 7 is recommended from cardia vascular standpoint. Defer management to PCP     PAD:  Status post multiple lower extremity angioplasty and stenting. Also had left lower extremity bypass surgery. Planning to have another lower extremity surgery  Deferred vascular team     Preoperative evaluation; She has functionally limiting claudication symptoms from peripheral artery disease.   She does not report anginal symptoms however she had a nuclear stress test last month which showed anterior wall reversible defect concerning for ischemia. Continue with dual antiplatelet agent, amlodipine, Coreg. Recommend left heart catheterization prior to considering high risk vascular surgery because of abnormal stress test.  DW patient regarding management strategy which includes medical management vs. Ischemia evaluation ( non-invasive vs. Invasive). Risk, benefit and alternatives of each strategy discussed in detail. Risk, benefit, complication of LHC and possible PCI ( including but not limited to bleeding, vascular trauma requiring surgery,  infection, heart failure, stroke, MI, emergent bypass surgery, severe allergic reactions, kidney failure, dialysis and death ) were discussed with patient and willing to proceed with procedure. Will be using moderate sedation     This plan was discussed with patient who is in agreement.     Thank you for allowing me to participate in patient care. Please feel free to call me if you have any question or concern.

## 2019-08-17 VITALS
DIASTOLIC BLOOD PRESSURE: 99 MMHG | SYSTOLIC BLOOD PRESSURE: 159 MMHG | HEIGHT: 65 IN | OXYGEN SATURATION: 100 % | RESPIRATION RATE: 18 BRPM | TEMPERATURE: 98.3 F | HEART RATE: 84 BPM | WEIGHT: 168 LBS | BODY MASS INDEX: 27.99 KG/M2

## 2019-08-17 LAB
ACT BLD: 169 SECS (ref 79–138)
ACT BLD: 213 SECS (ref 79–138)
ANION GAP SERPL CALC-SCNC: 5 MMOL/L (ref 3–18)
ATRIAL RATE: 71 BPM
BUN SERPL-MCNC: 10 MG/DL (ref 7–18)
BUN/CREAT SERPL: 15 (ref 12–20)
CALCIUM SERPL-MCNC: 8.3 MG/DL (ref 8.5–10.1)
CALCULATED P AXIS, ECG09: 64 DEGREES
CALCULATED R AXIS, ECG10: 37 DEGREES
CALCULATED T AXIS, ECG11: 44 DEGREES
CHLORIDE SERPL-SCNC: 108 MMOL/L (ref 100–111)
CHOLEST SERPL-MCNC: 174 MG/DL
CO2 SERPL-SCNC: 28 MMOL/L (ref 21–32)
CREAT SERPL-MCNC: 0.67 MG/DL (ref 0.6–1.3)
DIAGNOSIS, 93000: NORMAL
END DIASTOLIC PRESSURE: 14
ERYTHROCYTE [DISTWIDTH] IN BLOOD BY AUTOMATED COUNT: 15.8 % (ref 11.6–14.5)
GLUCOSE BLD STRIP.AUTO-MCNC: 99 MG/DL (ref 70–110)
GLUCOSE SERPL-MCNC: 90 MG/DL (ref 74–99)
HCT VFR BLD AUTO: 34.1 % (ref 35–45)
HDLC SERPL-MCNC: 34 MG/DL (ref 40–60)
HDLC SERPL: 5.1 {RATIO} (ref 0–5)
HGB BLD-MCNC: 10.3 G/DL (ref 12–16)
LDLC SERPL CALC-MCNC: 118.4 MG/DL (ref 0–100)
LIPID PROFILE,FLP: ABNORMAL
MCH RBC QN AUTO: 20.7 PG (ref 24–34)
MCHC RBC AUTO-ENTMCNC: 30.2 G/DL (ref 31–37)
MCV RBC AUTO: 68.6 FL (ref 74–97)
P-R INTERVAL, ECG05: 132 MS
PLATELET # BLD AUTO: 233 K/UL (ref 135–420)
PMV BLD AUTO: 11.3 FL (ref 9.2–11.8)
POTASSIUM SERPL-SCNC: 3.3 MMOL/L (ref 3.5–5.5)
Q-T INTERVAL, ECG07: 426 MS
QRS DURATION, ECG06: 94 MS
QTC CALCULATION (BEZET), ECG08: 462 MS
RBC # BLD AUTO: 4.97 M/UL (ref 4.2–5.3)
SODIUM SERPL-SCNC: 141 MMOL/L (ref 136–145)
TRIGL SERPL-MCNC: 108 MG/DL (ref ?–150)
VENTRICULAR RATE, ECG03: 71 BPM
VLDLC SERPL CALC-MCNC: 21.6 MG/DL
WBC # BLD AUTO: 4.6 K/UL (ref 4.6–13.2)

## 2019-08-17 PROCEDURE — 74011250637 HC RX REV CODE- 250/637: Performed by: INTERNAL MEDICINE

## 2019-08-17 PROCEDURE — 93005 ELECTROCARDIOGRAM TRACING: CPT

## 2019-08-17 PROCEDURE — 36415 COLL VENOUS BLD VENIPUNCTURE: CPT

## 2019-08-17 PROCEDURE — 85027 COMPLETE CBC AUTOMATED: CPT

## 2019-08-17 PROCEDURE — 80061 LIPID PANEL: CPT

## 2019-08-17 PROCEDURE — 82962 GLUCOSE BLOOD TEST: CPT

## 2019-08-17 PROCEDURE — 80048 BASIC METABOLIC PNL TOTAL CA: CPT

## 2019-08-17 RX ADMIN — ASPIRIN 81 MG 81 MG: 81 TABLET ORAL at 08:58

## 2019-08-17 RX ADMIN — CARVEDILOL 6.25 MG: 6.25 TABLET, FILM COATED ORAL at 08:58

## 2019-08-17 RX ADMIN — AMLODIPINE BESYLATE 2.5 MG: 5 TABLET ORAL at 08:58

## 2019-08-17 RX ADMIN — ISOSORBIDE MONONITRATE 60 MG: 30 TABLET, EXTENDED RELEASE ORAL at 08:58

## 2019-08-17 RX ADMIN — TICAGRELOR 90 MG: 90 TABLET ORAL at 08:58

## 2019-08-17 RX ADMIN — LOSARTAN POTASSIUM 50 MG: 25 TABLET, FILM COATED ORAL at 08:58

## 2019-08-17 NOTE — PROGRESS NOTES
Pt discharged home with no distress noted, accompanied by Mother and Doyle Lowe RN via  w/c to front entrance to vehicle. Pt has recieved discharge instructions, along with  prescription and belongings. Voiced understanding of discharge instructions.

## 2019-08-17 NOTE — PROGRESS NOTES
Assumed care of pt. Pt resting in bed. AxOx 4. Pt's mom at the bedside. Pt denies pain at this time. NAD. Call light within reach. Will continue to monitor. 2018- aware of pt's BP of 89/60. MAP of 70. Will continue to monitor. 2151- due med administered. Pt refusing Lipitor, says it will cause her to cough all night    2353- reassessment complete. Pt sleeping soundly in bed. No c/o at this time. 0400- reassessment complete. Pt sleeping soundly in bed. Will continue to monitor. 0750- EKG done per order.  Report given to Bella Cruz

## 2019-08-17 NOTE — DISCHARGE INSTRUCTIONS
DISCHARGE SUMMARY from Nurse    PATIENT INSTRUCTIONS:    After general anesthesia or intravenous sedation, for 24 hours or while taking prescription Narcotics:  · Limit your activities  · Do not drive and operate hazardous machinery  · Do not make important personal or business decisions  · Do  not drink alcoholic beverages  · If you have not urinated within 8 hours after discharge, please contact your surgeon on call. Report the following to your surgeon:  · Excessive pain, swelling, redness or odor of or around the surgical area  · Temperature over 100.5  · Nausea and vomiting lasting longer than 4 hours or if unable to take medications  · Any signs of decreased circulation or nerve impairment to extremity: change in color, persistent  numbness, tingling, coldness or increase pain  · Any questions    What to do at Home:  Recommended activity: Activity as tolerated,     If you experience any of the following symptoms as listed under \" When should I call for help? \" in your discharge teaching  , please follow up with your Doctor and/ or call 911. *  Please give a list of your current medications to your Primary Care Provider. *  Please update this list whenever your medications are discontinued, doses are      changed, or new medications (including over-the-counter products) are added. *  Please carry medication information at all times in case of emergency situations. These are general instructions for a healthy lifestyle:    No smoking/ No tobacco products/ Avoid exposure to second hand smoke  Surgeon General's Warning:  Quitting smoking now greatly reduces serious risk to your health.     Obesity, smoking, and sedentary lifestyle greatly increases your risk for illness    A healthy diet, regular physical exercise & weight monitoring are important for maintaining a healthy lifestyle    You may be retaining fluid if you have a history of heart failure or if you experience any of the following symptoms:  Weight gain of 3 pounds or more overnight or 5 pounds in a week, increased swelling in our hands or feet or shortness of breath while lying flat in bed. Please call your doctor as soon as you notice any of these symptoms; do not wait until your next office visit. The discharge information has been reviewed with the patient. The patient verbalized understanding. Discharge medications reviewed with the patient and appropriate educational materials and side effects teaching were provided. Patient Education        Learning About Coronary Artery Disease (CAD)  What is coronary artery disease? Coronary artery disease (CAD) occurs when plaque builds up in the arteries that bring oxygen-rich blood to your heart. Plaque is a fatty substance made of cholesterol, calcium, and other substances in the blood. This process is called hardening of the arteries, or atherosclerosis. What happens when you have coronary artery disease? · Plaque may narrow the coronary arteries. Narrowed arteries cause poor blood flow. This can lead to angina symptoms such as chest pain or discomfort. If blood flow is completely blocked, you could have a heart attack. · You can slow CAD and reduce the risk of future problems by making changes in your lifestyle. These include quitting smoking and eating heart-healthy foods. · Treatments for CAD, along with changes in your lifestyle, can help you live a longer and healthier life. How can you prevent coronary artery disease? · Do not smoke. It may be the best thing you can do to prevent heart disease. If you need help quitting, talk to your doctor about stop-smoking programs and medicines. These can increase your chances of quitting for good. · Be active. Get at least 30 minutes of exercise on most days of the week. Walking is a good choice. You also may want to do other activities, such as running, swimming, cycling, or playing tennis or team sports.   · Eat heart-healthy foods. Eat more fruits and vegetables and less foods that contain saturated and trans fats. Limit alcohol, sodium, and sweets. · Stay at a healthy weight. Lose weight if you need to. · Manage other health problems such as diabetes, high blood pressure, and high cholesterol. · If you have talked about it with your doctor, take a low-dose aspirin every day. Aspirin can help certain people lower their risk of a heart attack or stroke. But taking aspirin isn't right for everyone, because it can cause serious bleeding. Do not start taking daily aspirin unless your doctor knows about it. How is coronary artery disease treated? · Your doctor will suggest that you make lifestyle changes. For example, your doctor may ask you to eat healthy foods, quit smoking, lose extra weight, and be more active. · You will have to take medicines. · Your doctor may suggest a procedure to open narrowed or blocked arteries. This is called angioplasty. Or your doctor may suggest using healthy blood vessels to create detours around narrowed or blocked arteries. This is called bypass surgery. Follow-up care is a key part of your treatment and safety. Be sure to make and go to all appointments, and call your doctor if you are having problems. It's also a good idea to know your test results and keep a list of the medicines you take. Where can you learn more? Go to http://rolanda-leighann.info/. Enter [phone] in the search box to learn more about \"Learning About Coronary Artery Disease (CAD). \"  Current as of: July 22, 2018  Content Version: 12.1  © 1109-0957 Healthwise, Incorporated. Care instructions adapted under license by [employer] (which disclaims liability or warranty for this information). If you have questions about a medical condition or this instruction, always ask your healthcare professional. [address] any warranty or liability for your use of this information. Patient Education        Percutaneous Coronary Intervention: What to Expect at Home  Your Recovery    Percutaneous coronary intervention (PCI) is the name for procedures that are used to open a narrowed or blocked coronary artery. The two most common PCI procedures are coronary angioplasty and coronary stent placement. Your groin or arm may have a bruise and feel sore for a day or two after a percutaneous coronary intervention (PCI). You can do light activities around the house, but nothing strenuous for several days. This care sheet gives you a general idea about how long it will take for you to recover. But each person recovers at a different pace. Follow the steps below to get better as quickly as possible. How can you care for yourself at home? Activity    · If the doctor gave you a sedative:  ? For 24 hours, don't do anything that requires attention to detail, such as going to work, making important decisions, or signing any legal documents. It takes time for the medicine's effects to completely wear off.  ? For your safety, do not drive or operate any machinery that could be dangerous. Wait until the medicine wears off and you can think clearly and react easily.     · Do not do strenuous exercise and do not lift, pull, or push anything heavy until your doctor says it is okay. This may be for a day or two. You can walk around the house and do light activity, such as cooking.     · If the catheter was placed in your groin, try not to walk up stairs for the first couple of days.     · If the catheter was placed in your arm near your wrist, do not bend your wrist deeply for the first couple of days. Be careful using your hand to get into and out of a chair or bed.     · Carry your stent identification card with you at all times.     · If your doctor recommends it, get more exercise. Walking is a good choice. Bit by bit, increase the amount you walk every day.  Try for at least 30 minutes on most days of the week.   Diet    · Drink plenty of fluids to help your body flush out the dye. If you have kidney, heart, or liver disease and have to limit fluids, talk with your doctor before you increase the amount of fluids you drink.     · Keep eating a heart-healthy diet that has lots of fruits, vegetables, and whole grains. If you have not been eating this way, talk to your doctor. You also may want to talk to a dietitian. This expert can help you to learn about healthy foods and plan meals. Medicines    · Your doctor will tell you if and when you can restart your medicines. He or she will also give you instructions about taking any new medicines.     · If you take blood thinners, such as warfarin (Coumadin), clopidogrel (Plavix), or aspirin, be sure to talk to your doctor. He or she will tell you if and when to start taking those medicines again. Make sure that you understand exactly what your doctor wants you to do.     · Your doctor will prescribe blood-thinning medicines. You will likely take aspirin plus another antiplatelet, such as clopidogrel (Plavix). It is very important that you take these medicines exactly as directed. These medicines help keep the coronary artery open and reduce your risk of a heart attack.     · Call your doctor if you think you are having a problem with your medicine.    Care of the catheter site    · For 1 or 2 days, keep a bandage over the spot where the catheter was inserted. The bandage probably will fall off in this time.     · Put ice or a cold pack on the area for 10 to 20 minutes at a time to help with soreness or swelling. Put a thin cloth between the ice and your skin.     · You may shower 24 to 48 hours after the procedure, if your doctor okays it. Pat the incision dry.     · Do not soak the catheter site until it is healed. Don't take a bath for 1 week, or until your doctor tells you it is okay.     · Watch for bleeding from the site.  A small amount of blood (up to the size of a quarter) on the bandage can be normal.     · If you are bleeding, lie down and press on the area for 15 minutes to try to make it stop. If the bleeding does not stop, call your doctor or seek immediate medical care. Follow-up care is a key part of your treatment and safety. Be sure to make and go to all appointments, and call your doctor if you are having problems. It's also a good idea to know your test results and keep a list of the medicines you take. When should you call for help? Call 911 anytime you think you may need emergency care. For example, call if:    · You passed out (lost consciousness).     · You have severe trouble breathing.     · You have sudden chest pain and shortness of breath, or you cough up blood.     · You have symptoms of a heart attack, such as:  ? Chest pain or pressure. ? Sweating. ? Shortness of breath. ? Nausea or vomiting. ? Pain that spreads from the chest to the neck, jaw, or one or both shoulders or arms. ? Dizziness or lightheadedness. ? A fast or uneven pulse. After calling 911, chew 1 adult-strength aspirin. Wait for an ambulance. Do not try to drive yourself.     · You have been diagnosed with angina, and you have angina symptoms that do not go away with rest or are not getting better within 5 minutes after you take one dose of nitroglycerin.    Call your doctor now or seek immediate medical care if:    · You are bleeding from the area where the catheter was put in your artery.     · You have a fast-growing, painful lump at the catheter site.     · You have signs of infection, such as:  ? Increased pain, swelling, warmth, or redness. ? Red streaks leading from the catheter site. ? Pus draining from the catheter site. ? A fever.     · Your leg, arm, or hand is painful, looks blue, or feels cold, numb, or tingly.    Watch closely for changes in your health, and be sure to contact your doctor if you have any problems. Where can you learn more?   Go to http://rolanda-leighann.info/. Enter N114 in the search box to learn more about \"Percutaneous Coronary Intervention: What to Expect at Home. \"  Current as of: July 22, 2018  Content Version: 12.1  © 9574-0186 Healthwise, Incorporated. Care instructions adapted under license by [employer] (which disclaims liability or warranty for this information). If you have questions about a medical condition or this instruction, always ask your healthcare professional. [address] any warranty or liability for your use of this information.          ___________________________________________________________________________________________________________________________________

## 2019-08-17 NOTE — PROGRESS NOTES
Problem: Discharge Planning  Goal: *Discharge to safe environment  Outcome: Resolved/Met     Home    Reason for Admission:   CAD, abnormal stress test / Cardiac cath                  RRAT Score:  14                Do you (patient/family) have any concerns for transition/discharge? Not at this time                Plan for utilizing home health:   Not homebound      Current Advanced Directive/Advance Care Plan:  Yes, scanned in record            Transition of Care Plan:    Home with out-pt follow up. Chart reviewed. Met with pt, verified all demographics, all correct. NOK: Bob La, mother, whom will drive pt home @ discharge. 13 yr old grandson lives with pt. Uses no DME. Independent with ADL's prior to admit. Available as needed. Traci GutierresRN,ext [phone]. Patient has designated her mother  to participate in his/her discharge plan and to receive any needed information. Name:  Bob La  Address:  Phone number:  [phone]    Patient and/or next of kin has been given and has signed the [facility] Outpatient Observation  Notification letter and all questions answered. Copy of this notice given to patient and copy placed on chart. Patient and/or next of kin has been given the Outpatient Observation Information and Notification letter and all questions answered. Noted orders for discharge, no needs identified. Care Management Interventions  PCP Verified by CM: Yes(Dr. Adele French, seen appro 2 months ago)  Palliative Care Criteria Met (RRAT>21 & CHF Dx)?: No  Mode of Transport at Discharge: Other (see comment)(Family)  Transition of Care Consult (CM Consult): Discharge Planning  Discharge Durable Medical Equipment: No  Physical Therapy Consult: No  Occupational Therapy Consult: No  Speech Therapy Consult: No  Current Support Network:  Other(15yr old grandson lives with pt)  Confirm Follow Up Transport: Self  Plan discussed with Pt/Family/Caregiver: Yes  Discharge Location  Discharge Placement: Home

## 2019-08-17 NOTE — PROGRESS NOTES
Problem: Falls - Risk of  Goal: *Absence of Falls  Description  Document Jon Rodriguez Fall Risk and appropriate interventions in the flowsheet. Outcome: Progressing Towards Goal  Note:   Fall Risk Interventions:  Mobility Interventions: Communicate number of staff needed for ambulation/transfer    Mentation Interventions: Door open when patient unattended    Medication Interventions: Patient to call before getting OOB    Elimination Interventions:  Toilet paper/wipes in reach    History of Falls Interventions: Door open when patient unattended         Problem: Patient Education: Go to Patient Education Activity  Goal: Patient/Family Education  Outcome: Progressing Towards Goal

## 2019-08-17 NOTE — DISCHARGE SUMMARY
[facility]  -  Discharge Summary      Discharge Summary     Patient: Richard Rodriges MRN: [id number]  SSN: [id number]    YOB: 1965  Age: 48 y.o. Sex: female       Admit Date: 8/16/2019    Discharge Date: 8/17/2019      Admission Diagnoses: CAD (coronary artery disease) [I25.10]; Abnormal nuclear stress test [R94.39]; Status post left heart catheterization (LHC) [Z98.890]    Discharge Diagnoses:   -           S/P OM branch BMS stent placement             CAD (coronary artery disease)                         S/P LAD 2.25 x 38 mm and 2.5 x 12 mm SYNERGY (2016), OM 2.25 X 18 mm BMS (08/19)             Cardiomyopathy ([facility]                       Depression                   Diabetes ([facility] [facility]                       Hypertension                PAD [facility]peripheral artery disease) ([facility])                        S/P LE angioplasty and stent, LLE bypass             Tobacco abuse                        Unspecified sleep apnea    Discharge Condition: Good    Hospital Course: Shahzad Lehman was brought to the hospital for outpatient cardiac catheterization procedure because of abnormal stress test prior to high risk vascular surgery. Patient underwent cardiac catheterization which showed coronary artery disease and had bare-metal stent placed to obtuse marginal branch. Patient tolerated procedure well without any complication. Patient was observed overnight and was discharged in stable condition    Consults: None    Significant Diagnostic Studies:   Cardiac catheterization was performed. S/P OM branch BMS   ASA life long  Brilinta for a month  Delay vascular surgery at least for a month.  ( discussed with  from vascular team before stenting and he requested coronary management first)  R subclavian has 90% tubular stenosis (  to address and manage that conservatively for now and evaluate later statge)     Disposition: home    Discharge Medications:   Current Discharge Medication List START taking these medications    Details   ticagrelor (BRILINTA) 90 mg tablet Take 1 Tab by mouth two (2) times a day. Qty: 60 Tab, Refills: 11         CONTINUE these medications which have NOT CHANGED    Details   traMADol-acetaminophen (ULTRACET) 37.5-325 mg per tablet Take 1-2 Tabs by mouth. carvedilol (COREG) 6.25 mg tablet Take  by mouth two (2) times daily (with meals). buPROPion SR (WELLBUTRIN SR) 150 mg SR tablet Take  by mouth two (2) times a day. isosorbide mononitrate ER (IMDUR) 60 mg CR tablet Take 60 mg by mouth daily. losartan (COZAAR) 50 mg tablet Take  by mouth daily. aspirin 81 mg chewable tablet 81 mg.      linagliptin (TRADJENTA) 5 mg PO tablet 5 mg.      clonazePAM (KLONOPIN) 1 mg tablet Take 1 mg by mouth two (2) times a day. amLODIPine (NORVASC) 2.5 mg tablet 2.5 mg.      albuterol (PROVENTIL HFA, VENTOLIN HFA) 90 mcg/actuation inhaler Take 2 Puffs by inhalation every four (4) hours as needed for Shortness of Breath or Wheezing. atorvastatin (LIPITOR) 80 mg tablet Take 80 mg by mouth daily. QUEtiapine (SEROQUEL) 50 mg tablet Take 50 mg by mouth daily. gabapentin (NEURONTIN) 300 mg capsule Take 300 mg by mouth three (3) times daily. nitroglycerin (NITROSTAT) 0.4 mg SL tablet 0.4 mg. STOP taking these medications       clopidogrel (PLAVIX) 75 mg tab Comments:   Reason for Stopping:               Activity: No lifting, Driving, or Strenuous exercise for 7 days  No sexual activity for a 2 weeks    Diet: Cardiac Diet  Wound Care: Keep wound clean and dry    Follow-up Appointments   Procedures    FOLLOW UP VISIT Appointment in: Two Weeks Follow up with Dr. Homar Sams in 2-4 weeks     Follow up with Dr. Homar Sams in 2-4 weeks     Standing Status:   Standing     Number of Occurrences:   1     Order Specific Question:   Appointment in     Answer:    Two Weeks       Signed By: Sarita Lamar MD     August 17, 2019

## 2019-08-17 NOTE — PROGRESS NOTES
[facility]  -  Progress Note      Patient: Gracie Lucas MRN: [id number]  SSN: [id number]    YOB: 1965  Age: 48 y.o. Sex: female      Admit Date: 8/16/2019    Assessment:     -  S/P OM branch BMS stent placement   CAD (coronary artery disease)    S/P LAD 2.25 x 38 mm and 2.5 x 12 mm SYNERGY (2016), OM 2.25 X 18 mm BMS (08/19)   Cardiomyopathy ([facility] [address]    Depression    Diabetes ([facility] [address]    Hypertension    PAD (peripheral artery disease) ([facility])    S/P LE angioplasty and stent, LLE bypass   Tobacco abuse    Unspecified sleep apnea      Plan:     No complication noted. Tolerated procedure well. Aspirin lifelong  Brilinta 30-day supply provided by the pharmacy. DW patient importance of dual antiplatelet agent after coronary stenting. ASA life long unless contraindicated  Brilinta for a month. Advised patient to not take Plavix. DW nurse to provide 30 day free brilinta card and filled it across pharmacy before  discharge today. Continue cardiac medication  Follow-up in clinic 3 to 4 weeks      Subjective:     No new complaints. Denies any chest pain or chest tightness. Wants to go home    Objective:      Patient Vitals for the past 8 hrs:   Temp Pulse Resp BP SpO2   08/17/19 0900 98.3 °F (36.8 °C) 84 18 (!) 159/99 100 %   08/17/19 0349 98.2 °F (36.8 °C) 70 18 143/89 95 %         Patient Vitals for the past 96 hrs:   Weight   08/16/19 0808 168 lb (76.2 kg)       No intake or output data in the 24 hours ending 08/17/19 0590    Physical Exam:  General:  alert, cooperative, no distress, appears stated age  Neck:  no JVD  Lungs:  clear to auscultation bilaterally  Heart:  regular rate and rhythm, S1, S2 normal, no murmur, click, rub or gallop  Abdomen:  abdomen is soft without significant tenderness, masses, organomegaly or guarding  Extremities:  Right wrist and right groin cath site without any hematoma or bleeding.  Pulse +    Data Review:     Labs: Results: Chemistry Recent Labs     08/17/19  0430   GLU 90      K 3.3*      CO2 28   BUN 10   CREA 0.67   CA 8.3*   AGAP 5   BUCR 15      CBC w/Diff Recent Labs     08/17/19 0430   WBC 4.6   RBC 4.97   HGB 10.3*   HCT 34.1*         Cardiac Enzymes No results found for: CPK, CK, CKMMB, CKMB, RCK3, CKMBT, CKNDX, CKND1, LOUANN, TROPT, TROIQ, COURTNEY, TROPT, TNIPOC, BNP, BNPP   Coagulation No results for input(s): PTP, INR, APTT in the last 72 hours. No lab exists for component: INREXT    Lipid Panel No results found for: CHOL, CHOLPOCT, CHOLX, CHLST, CHOLV, 656208, HDL, LDL, LDLC, DLDLP, 646166, VLDLC, VLDL, TGLX, TRIGL, TRIGP, TGLPOCT, CHHD, CHHDX   BNP No results found for: BNP, BNPP, XBNPT   Liver Enzymes No results for input(s): TP, ALB, TBIL, AP, SGOT, GPT in the last 72 hours.     No lab exists for component: DBIL   Digoxin    Thyroid Studies No results found for: T4, T3U, TSH, TSHEXT 1-2 cups/cans per day

## 2019-08-18 LAB
ATRIAL RATE: 65 BPM
ATRIAL RATE: 66 BPM
CALCULATED P AXIS, ECG09: -36 DEGREES
CALCULATED P AXIS, ECG09: 46 DEGREES
CALCULATED R AXIS, ECG10: 22 DEGREES
CALCULATED R AXIS, ECG10: 24 DEGREES
CALCULATED T AXIS, ECG11: -80 DEGREES
CALCULATED T AXIS, ECG11: -81 DEGREES
DIAGNOSIS, 93000: NORMAL
DIAGNOSIS, 93000: NORMAL
P-R INTERVAL, ECG05: 140 MS
P-R INTERVAL, ECG05: 144 MS
Q-T INTERVAL, ECG07: 430 MS
Q-T INTERVAL, ECG07: 432 MS
QRS DURATION, ECG06: 90 MS
QRS DURATION, ECG06: 92 MS
QTC CALCULATION (BEZET), ECG08: 447 MS
QTC CALCULATION (BEZET), ECG08: 452 MS
VENTRICULAR RATE, ECG03: 65 BPM
VENTRICULAR RATE, ECG03: 66 BPM

## 2019-09-24 ENCOUNTER — OFFICE VISIT (OUTPATIENT)
Dept: CARDIOLOGY CLINIC | Age: 54
End: 2019-09-24

## 2019-09-24 VITALS
HEART RATE: 82 BPM | BODY MASS INDEX: 27.62 KG/M2 | WEIGHT: 166 LBS | DIASTOLIC BLOOD PRESSURE: 84 MMHG | SYSTOLIC BLOOD PRESSURE: 131 MMHG | OXYGEN SATURATION: 99 %

## 2019-09-24 DIAGNOSIS — I25.83 CORONARY ARTERY DISEASE DUE TO LIPID RICH PLAQUE: Primary | ICD-10-CM

## 2019-09-24 DIAGNOSIS — I25.10 CORONARY ARTERY DISEASE DUE TO LIPID RICH PLAQUE: Primary | ICD-10-CM

## 2019-09-24 DIAGNOSIS — E78.00 PURE HYPERCHOLESTEROLEMIA: ICD-10-CM

## 2019-09-24 DIAGNOSIS — I10 ESSENTIAL HYPERTENSION WITH GOAL BLOOD PRESSURE LESS THAN 140/90: ICD-10-CM

## 2019-09-24 RX ORDER — NITROGLYCERIN 0.4 MG/1
0.4 TABLET SUBLINGUAL
Qty: 25 TAB | Refills: 3 | Status: ON HOLD | OUTPATIENT
Start: 2019-09-24 | End: 2022-05-05

## 2019-09-24 RX ORDER — ISOSORBIDE MONONITRATE 60 MG/1
60 TABLET, EXTENDED RELEASE ORAL DAILY
Qty: 30 TAB | Refills: 6 | Status: SHIPPED | OUTPATIENT
Start: 2019-09-24 | End: 2020-10-12

## 2019-09-24 RX ORDER — CARVEDILOL 6.25 MG/1
6.25 TABLET ORAL 2 TIMES DAILY WITH MEALS
Qty: 60 TAB | Refills: 6 | Status: SHIPPED | OUTPATIENT
Start: 2019-09-24 | End: 2020-07-27

## 2019-09-24 RX ORDER — LOSARTAN POTASSIUM 50 MG/1
50 TABLET ORAL DAILY
Qty: 30 TAB | Refills: 6 | Status: SHIPPED | OUTPATIENT
Start: 2019-09-24 | End: 2022-03-11

## 2019-09-24 NOTE — PROGRESS NOTES
[facility]    Ms. Talyor Ferguson is 48 y.o. female with history of coronary artery disease, coronary stent, peripheral artery disease, tobacco abuse disorder and other medical problem    Patient is here today for follow-up appointment. Since last visit, she is feeling much better. After starting Brilinta, she feels like her claudication symptoms have improved. She denies any use of nitroglycerin since coronary stenting. Before the stenting she used to have her angina requiring nitroglycerin. She is following up with vascular team in couple weeks. She denies PND or lower extremity swelling. She denies palpitation, presyncope or syncope  Denies any nausea, vomiting, abdominal pain, fever, chills, sputum production. No hematuria or other bleeding complaints    Past Medical History:   Diagnosis Date    Arthritis     Asthma     CAD (coronary artery disease)     S/P LAD 2.25 x 38 mm and 2.5 x 12 mm SYNERGY (), OM 2.25 X 18 mm BMS ()    Cardiomyopathy ([facility] [address]     Depression     Diabetes ([facility] [address]     Hypertension     PAD (peripheral artery disease) ([facility] [address]     S/P LE angioplasty and stent, LLE bypass    Tobacco abuse     Unspecified sleep apnea          Past Surgical History:   Procedure Laterality Date    HX  SECTION      HX HYSTERECTOMY      HX ORTHOPAEDIC      \"plate in R arm\"    HX SHOULDER ARTHROSCOPY Left        Current Outpatient Medications   Medication Sig    ticagrelor (BRILINTA) 90 mg tablet Take 1 Tab by mouth two (2) times a day.  traMADol-acetaminophen (ULTRACET) 37.5-325 mg per tablet Take 1-2 Tabs by mouth.  atorvastatin (LIPITOR) 80 mg tablet Take 80 mg by mouth daily.  carvedilol (COREG) 6.25 mg tablet Take  by mouth two (2) times daily (with meals).  buPROPion SR (WELLBUTRIN SR) 150 mg SR tablet Take  by mouth two (2) times a day.     QUEtiapine (SEROQUEL) 50 mg tablet Take 50 mg by mouth daily.    isosorbide mononitrate ER (IMDUR) 60 mg CR tablet Take 60 mg by mouth daily.  gabapentin (NEURONTIN) 300 mg capsule Take 300 mg by mouth three (3) times daily.  losartan (COZAAR) 50 mg tablet Take  by mouth daily.  aspirin 81 mg chewable tablet 81 mg.    linagliptin (TRADJENTA) 5 mg PO tablet 5 mg.  clonazePAM (KLONOPIN) 1 mg tablet Take 1 mg by mouth two (2) times a day.  amLODIPine (NORVASC) 2.5 mg tablet 2.5 mg.    nitroglycerin (NITROSTAT) 0.4 mg SL tablet 0.4 mg.    albuterol (PROVENTIL HFA, VENTOLIN HFA) 90 mcg/actuation inhaler Take 2 Puffs by inhalation every four (4) hours as needed for Shortness of Breath or Wheezing. No current facility-administered medications for this visit. Allergies and Sensitivities:  No Known Allergies    Family History:  Family History   Problem Relation Age of Onset    Diabetes Mother     Hypertension Mother     Heart Disease Mother        Social History:  Social History     Tobacco Use    Smoking status: Current Every Day Smoker     Packs/day: 0.50    Smokeless tobacco: Current User   Substance Use Topics    Alcohol use: No    Drug use: No     She  reports that she has been smoking. She has been smoking about 0.50 packs per day. She uses smokeless tobacco.  She  reports that she does not drink alcohol. Review of Systems:  Cardiac symptoms as noted above in HPI. All others negative. Denies fatigue, malaise, skin rash, blurring vision, photophobia, neck pain, hemoptysis, chronic cough, nausea, vomiting, hematuria, burning micturition, BRBPR, chronic headaches.     Physical Exam:  BP Readings from Last 3 Encounters:   09/24/19 131/84   08/17/19 (!) 159/99   08/10/19 (!) 141/92         Pulse Readings from Last 3 Encounters:   09/24/19 82   08/17/19 84   08/06/19 77          Wt Readings from Last 3 Encounters:   09/24/19 166 lb (75.3 kg)   08/16/19 168 lb (76.2 kg)   08/10/19 169 lb (76.7 kg)       Constitutional: Oriented to person, place, and time. HENT: Head: Normocephalic and atraumatic. Neck: No JVD present. Cardiovascular: Regular rhythm. No murmur, gallop or rubs appreciated  Lung: Breath sounds normal. No respiratory distress. No ronchi or rales appreciated  Abdominal: No tenderness. No rebound and no guarding. Musculoskeletal: There is no lower extremity edema. No cynosis    Review of Data  LABS:   Lab Results   Component Value Date/Time    Sodium 141 08/17/2019 04:30 AM    Potassium 3.3 (L) 08/17/2019 04:30 AM    Chloride 108 08/17/2019 04:30 AM    CO2 28 08/17/2019 04:30 AM    Glucose 90 08/17/2019 04:30 AM    BUN 10 08/17/2019 04:30 AM    Creatinine 0.67 08/17/2019 04:30 AM     Lipids Latest Ref Rng & Units 8/17/2019   Chol, Total <200 MG/   HDL 40 - 60 MG/DL 34(L)   LDL 0 - 100 MG/. 4(H)   Trig <150 MG/   Chol/HDL Ratio 0 - 5.0   5.1(H)   Some recent data might be hidden     Lab Results   Component Value Date/Time    ALT (SGPT) 21 08/06/2019 03:06 PM     Lab Results   Component Value Date/Time    Hemoglobin A1c 5.9 (H) 12/26/2018 03:15 AM       EKG    ECHO (08/19)  Left Ventricle Normal cavity size and systolic function (ejection fraction normal). Mildly to moderately increased wall thickness. The estimated ejection fraction is 56 - 60%. Visually measured ejection fraction. No regional wall motion abnormality noted. There is mild (grade 1) left ventricular diastolic dysfunction. Wall Scoring The left ventricular wall motion is normal.            Left Atrium Mildly dilated left atrium. Left Atrium volume index is 29 mL/m2. Right Ventricle Normal cavity size and global systolic function. Right Atrium Normal cavity size. Aortic Valve Trileaflet valve structure, no stenosis and no regurgitation. Mitral Valve Normal valve structure, no stenosis and no regurgitation. Tricuspid Valve Normal valve structure and no stenosis.  Tricuspid regurgitation is inadequate for estimation of right ventricular systolic pressure. STRESS TEST (07/19)  · Baseline ECG: Normal sinus rhythm, non-specific ST-T wave abnormalities. · Gated SPECT: Left ventricular function post-stress was normal. Calculated ejection fraction is 59%. · Left ventricular perfusion is abnormal.  · Myocardial perfusion imaging defect 1: There is a defect that is moderate in size present in the apical to mid anterior and apex location(s) that is reversible. The defect appears to be ischemia. · Positive myocardial perfusion imaging. Myocardial perfusion imaging supports an intermediate risk stress test.    CATHETERIZATION  (08/19)  Left Main   The vessel is angiographically normal.   Left Anterior Descending   Mid and distal LAD has stent which has minimal 10-15% in-stent restenosis. Distal apical LAD has 40% narrowing after distal LAD stent. D1: 50% ostial and proximal narrowing   Ramus Intermedius   The vessel is small. Ostial and proximal 50% narrowing. Left Circumflex   Mid 40% narrowing after origin of OM1 branch. Otherwise normal OM1 branch has mid vessel eccentric 70-80% long disease. First Obtuse Marginal Branch   1st Mrg lesion 80% stenosed. . The pre-interventional distal flow is normal (LEONARDO 3). Lesion is the culprit lesion. The lesion is eccentric. The lesion was not previously treated. The stenosis was measured by a visual reading. Right Coronary Artery   Mid RCA has 40% tubular narrowing otherwise no significant obstructive disease. RPL branch has mid 60% narrowing however small vessel at this level RPDA has proximal-mid 40-50% narrowing however small vessel. Intervention     1st Mrg lesion   Angioplasty   Angioplasty was performed prior to stent deployment. The balloon used was a CATH BLLN RX MINI TREK 2X15MM -- . Balloon inflated using single inflation technique. Stent   A single stent was placed. Bare metal stent was successfully placed. The stent used was a STENT COR RX MINI [id number] -- MULTI-LINK MINI VISION. Angioplasty   Angioplasty using a standard balloon was performed following stent deployment. The balloon used was a CATH BLLN COR DIL 2.82E82ON -- NC TREK RAPID-EXCHANGE. Balloon inflated using single inflation technique. Post-Intervention Lesion Assessment   The intervention was successful. The guidewire crossed the lesion. Post-intervention LEONARDO flow is 3. There were no complications. There is a 0% residual stenosis post intervention. IMPRESSION & PLAN:  Patient is 48 y. o.female with coronary artery disease, coronary stent, peripheral artery disease, diabetes, hypertension and other multiple medical problem    Coronary artery disease:  Patient had anterior wall myocardial infarction in 2016 requiring to LAD stent. Cardiac stress test in July 2019, intermediate risk. Cardiac cath was performed in August 2019 and patient received OM1 stent, bare-metal stent  Since then patient does not have any anginal symptoms and has not used nitroglycerin. Currently on aspirin and Brilinta. Would prefer Brilinta at least for a month, preferably for a year. Continue with Coreg, Imdur, losartan, amlodipine, atorvastatin    Hypertension:  Blood pressure 131/80 1 mmHg. Continue with current antihypertensive medications    Hyperlipidemia: This is being managed by primary care provider. Currently she is on high intensity atorvastatin 80 mg daily. Continue same    Diabetes:  Goal hemoglobin A1c less than 7 is recommended from cardia vascular standpoint. Defer management to PCP    PAD:  Status post multiple lower extremity angioplasty and stenting. Also had left lower extremity bypass surgery. Planning to have another lower extremity surgery  Patient is doing much better since coronary stent and has angina has resolved. No nitro glycerin use since coronary stent  Patient has follow-up appointment with vascular team next month. Possible lower extremity surgery has been scheduled for November 2019.   As patient's coronary symptoms has resolved and has no decompressive heart failure, I do not believe further cardiac work-up will be necessary. If patient has no further angina and remains stable, she would be at intermediate risk for vascular Surgery from cardia vascular standpoint. Cardiac medication should be continued perioperatively    Thank you for allowing me to participate in patient care. Please feel free to call me if you have any question or concern. Gray Hernandez MD  Please note: This document has been produced using voice recognition software. Unrecognized errors in transcription may be present.

## 2019-09-24 NOTE — PROGRESS NOTES
1. Have you been to the ER, urgent care clinic since your last visit? Hospitalized since your last visit? No     2. Have you seen or consulted any other health care providers outside of the [address] [place] [address] since your last visit? Include any pap smears or colon screening.   No

## 2019-11-01 ENCOUNTER — ANESTHESIA EVENT (OUTPATIENT)
Dept: SURGERY | Age: 54
DRG: 253 | End: 2019-11-01
Payer: MEDICARE

## 2019-11-04 ENCOUNTER — HOSPITAL ENCOUNTER (INPATIENT)
Age: 54
LOS: 2 days | Discharge: HOME HEALTH | DRG: 253 | End: 2019-11-06
Attending: SURGERY | Admitting: SURGERY
Payer: MEDICARE

## 2019-11-04 ENCOUNTER — APPOINTMENT (OUTPATIENT)
Dept: GENERAL RADIOLOGY | Age: 54
DRG: 253 | End: 2019-11-04
Attending: SURGERY
Payer: MEDICARE

## 2019-11-04 ENCOUNTER — ANESTHESIA (OUTPATIENT)
Dept: SURGERY | Age: 54
DRG: 253 | End: 2019-11-04
Payer: MEDICARE

## 2019-11-04 DIAGNOSIS — L76.82 PAIN AT SURGICAL INCISION: Primary | ICD-10-CM

## 2019-11-04 PROBLEM — I73.9 PVD (PERIPHERAL VASCULAR DISEASE) (HCC): Status: ACTIVE | Noted: 2019-11-04

## 2019-11-04 LAB
ABO + RH BLD: NORMAL
ACT BLD: 120 SECS (ref 79–138)
ACT BLD: 169 SECS (ref 79–138)
ACT BLD: 208 SECS (ref 79–138)
ACT BLD: 208 SECS (ref 79–138)
ACT BLD: 219 SECS (ref 79–138)
BLOOD GROUP ANTIBODIES SERPL: NORMAL
GLUCOSE BLD STRIP.AUTO-MCNC: 100 MG/DL (ref 70–110)
GLUCOSE BLD STRIP.AUTO-MCNC: 140 MG/DL (ref 70–110)
GLUCOSE BLD STRIP.AUTO-MCNC: 144 MG/DL (ref 70–110)
SPECIMEN EXP DATE BLD: NORMAL

## 2019-11-04 PROCEDURE — 74011250636 HC RX REV CODE- 250/636: Performed by: NURSE ANESTHETIST, CERTIFIED REGISTERED

## 2019-11-04 PROCEDURE — 76010000174 HC OR TIME 3.5 TO 4 HR INTENSV-TIER 1: Performed by: SURGERY

## 2019-11-04 PROCEDURE — 77030014007 HC SPNG HEMSTAT J&J -B: Performed by: SURGERY

## 2019-11-04 PROCEDURE — 86900 BLOOD TYPING SEROLOGIC ABO: CPT

## 2019-11-04 PROCEDURE — 65610000006 HC RM INTENSIVE CARE

## 2019-11-04 PROCEDURE — C1768 GRAFT, VASCULAR: HCPCS | Performed by: SURGERY

## 2019-11-04 PROCEDURE — 77030013797 HC KT TRNSDUC PRSSR EDWD -A: Performed by: ANESTHESIOLOGY

## 2019-11-04 PROCEDURE — 77030011267 HC ELECTRD BLD COVD -A: Performed by: SURGERY

## 2019-11-04 PROCEDURE — 76210000016 HC OR PH I REC 1 TO 1.5 HR: Performed by: SURGERY

## 2019-11-04 PROCEDURE — 77030002987 HC SUT PROL J&J -B: Performed by: SURGERY

## 2019-11-04 PROCEDURE — 77030018836 HC SOL IRR NACL ICUM -A: Performed by: SURGERY

## 2019-11-04 PROCEDURE — 77030020263 HC SOL INJ SOD CL0.9% LFCR 1000ML: Performed by: SURGERY

## 2019-11-04 PROCEDURE — 77030005518 HC CATH URETH FOL 2W BARD -B: Performed by: SURGERY

## 2019-11-04 PROCEDURE — 85347 COAGULATION TIME ACTIVATED: CPT

## 2019-11-04 PROCEDURE — 77030031139 HC SUT VCRL2 J&J -A: Performed by: SURGERY

## 2019-11-04 PROCEDURE — 77030005401 HC CATH RAD ARRO -A: Performed by: ANESTHESIOLOGY

## 2019-11-04 PROCEDURE — 74011000250 HC RX REV CODE- 250: Performed by: NURSE ANESTHETIST, CERTIFIED REGISTERED

## 2019-11-04 PROCEDURE — 041L0KQ BYPASS LEFT FEMORAL ARTERY TO LOWER EXTREMITY ARTERY WITH NONAUTOLOGOUS TISSUE SUBSTITUTE, OPEN APPROACH: ICD-10-PCS | Performed by: SURGERY

## 2019-11-04 PROCEDURE — 77030008477 HC STYL SATN SLP COVD -A: Performed by: ANESTHESIOLOGY

## 2019-11-04 PROCEDURE — 74011250637 HC RX REV CODE- 250/637: Performed by: NURSE ANESTHETIST, CERTIFIED REGISTERED

## 2019-11-04 PROCEDURE — 77030008467 HC STPLR SKN COVD -B: Performed by: SURGERY

## 2019-11-04 PROCEDURE — 74011250636 HC RX REV CODE- 250/636: Performed by: SURGERY

## 2019-11-04 PROCEDURE — 74011000272 HC RX REV CODE- 272: Performed by: SURGERY

## 2019-11-04 PROCEDURE — 77030008462 HC STPLR SKN PROX J&J -A: Performed by: SURGERY

## 2019-11-04 PROCEDURE — 77030002996 HC SUT SLK J&J -A: Performed by: SURGERY

## 2019-11-04 PROCEDURE — 74011250636 HC RX REV CODE- 250/636

## 2019-11-04 PROCEDURE — 77030032490 HC SLV COMPR SCD KNE COVD -B: Performed by: SURGERY

## 2019-11-04 PROCEDURE — 77030013079 HC BLNKT BAIR HGGR 3M -A: Performed by: ANESTHESIOLOGY

## 2019-11-04 PROCEDURE — 77030002933 HC SUT MCRYL J&J -A: Performed by: SURGERY

## 2019-11-04 PROCEDURE — 77030014647 HC SEAL FBRN TISSL BAXT -D: Performed by: SURGERY

## 2019-11-04 PROCEDURE — 82962 GLUCOSE BLOOD TEST: CPT

## 2019-11-04 PROCEDURE — 77030008683 HC TU ET CUF COVD -A: Performed by: ANESTHESIOLOGY

## 2019-11-04 PROCEDURE — 74011250637 HC RX REV CODE- 250/637: Performed by: SURGERY

## 2019-11-04 PROCEDURE — 77030041247 HC PROTECTOR HEEL HEELMEDIX MDII -B

## 2019-11-04 PROCEDURE — 77030020268 HC MISC GENERAL SUPPLY: Performed by: SURGERY

## 2019-11-04 PROCEDURE — 74011000250 HC RX REV CODE- 250: Performed by: SURGERY

## 2019-11-04 PROCEDURE — 76060000038 HC ANESTHESIA 3.5 TO 4 HR: Performed by: SURGERY

## 2019-11-04 RX ORDER — MAGNESIUM SULFATE 100 %
16 CRYSTALS MISCELLANEOUS AS NEEDED
Status: DISCONTINUED | OUTPATIENT
Start: 2019-11-04 | End: 2019-11-04

## 2019-11-04 RX ORDER — DEXTROSE, SODIUM CHLORIDE, SODIUM LACTATE, POTASSIUM CHLORIDE, AND CALCIUM CHLORIDE 5; .6; .31; .03; .02 G/100ML; G/100ML; G/100ML; G/100ML; G/100ML
INJECTION, SOLUTION INTRAVENOUS AS NEEDED
Status: DISCONTINUED | OUTPATIENT
Start: 2019-11-04 | End: 2019-11-04 | Stop reason: HOSPADM

## 2019-11-04 RX ORDER — SODIUM CHLORIDE 9 MG/ML
INJECTION, SOLUTION INTRAVENOUS
Status: DISCONTINUED | OUTPATIENT
Start: 2019-11-04 | End: 2019-11-04 | Stop reason: HOSPADM

## 2019-11-04 RX ORDER — ONDANSETRON 2 MG/ML
4 INJECTION INTRAMUSCULAR; INTRAVENOUS ONCE
Status: DISCONTINUED | OUTPATIENT
Start: 2019-11-04 | End: 2019-11-04 | Stop reason: HOSPADM

## 2019-11-04 RX ORDER — MIDAZOLAM HYDROCHLORIDE 1 MG/ML
INJECTION, SOLUTION INTRAMUSCULAR; INTRAVENOUS AS NEEDED
Status: DISCONTINUED | OUTPATIENT
Start: 2019-11-04 | End: 2019-11-04 | Stop reason: HOSPADM

## 2019-11-04 RX ORDER — AMLODIPINE BESYLATE 2.5 MG/1
2.5 TABLET ORAL DAILY
Status: DISCONTINUED | OUTPATIENT
Start: 2019-11-05 | End: 2019-11-06 | Stop reason: HOSPADM

## 2019-11-04 RX ORDER — ISOSORBIDE MONONITRATE 60 MG/1
60 TABLET, EXTENDED RELEASE ORAL DAILY
Status: DISCONTINUED | OUTPATIENT
Start: 2019-11-05 | End: 2019-11-06 | Stop reason: HOSPADM

## 2019-11-04 RX ORDER — SODIUM CHLORIDE 9 MG/ML
75 INJECTION, SOLUTION INTRAVENOUS CONTINUOUS
Status: DISPENSED | OUTPATIENT
Start: 2019-11-04 | End: 2019-11-05

## 2019-11-04 RX ORDER — DOPAMINE HYDROCHLORIDE 160 MG/100ML
INJECTION, SOLUTION INTRAVENOUS
Status: DISCONTINUED | OUTPATIENT
Start: 2019-11-04 | End: 2019-11-04 | Stop reason: HOSPADM

## 2019-11-04 RX ORDER — HEPARIN SODIUM 1000 [USP'U]/ML
INJECTION, SOLUTION INTRAVENOUS; SUBCUTANEOUS AS NEEDED
Status: DISCONTINUED | OUTPATIENT
Start: 2019-11-04 | End: 2019-11-04 | Stop reason: HOSPADM

## 2019-11-04 RX ORDER — HYDROMORPHONE HYDROCHLORIDE 1 MG/ML
INJECTION, SOLUTION INTRAMUSCULAR; INTRAVENOUS; SUBCUTANEOUS AS NEEDED
Status: DISCONTINUED | OUTPATIENT
Start: 2019-11-04 | End: 2019-11-04 | Stop reason: HOSPADM

## 2019-11-04 RX ORDER — HYDROMORPHONE HYDROCHLORIDE 2 MG/ML
0.5 INJECTION, SOLUTION INTRAMUSCULAR; INTRAVENOUS; SUBCUTANEOUS
Status: DISCONTINUED | OUTPATIENT
Start: 2019-11-04 | End: 2019-11-04 | Stop reason: HOSPADM

## 2019-11-04 RX ORDER — INSULIN LISPRO 100 [IU]/ML
INJECTION, SOLUTION INTRAVENOUS; SUBCUTANEOUS ONCE
Status: DISCONTINUED | OUTPATIENT
Start: 2019-11-04 | End: 2019-11-04 | Stop reason: HOSPADM

## 2019-11-04 RX ORDER — ATORVASTATIN CALCIUM 40 MG/1
80 TABLET, FILM COATED ORAL DAILY
Status: DISCONTINUED | OUTPATIENT
Start: 2019-11-05 | End: 2019-11-06 | Stop reason: HOSPADM

## 2019-11-04 RX ORDER — ALBUTEROL SULFATE 0.83 MG/ML
2.5 SOLUTION RESPIRATORY (INHALATION)
Status: DISCONTINUED | OUTPATIENT
Start: 2019-11-04 | End: 2019-11-06 | Stop reason: HOSPADM

## 2019-11-04 RX ORDER — SODIUM CHLORIDE 0.9 % (FLUSH) 0.9 %
5-40 SYRINGE (ML) INJECTION EVERY 8 HOURS
Status: DISCONTINUED | OUTPATIENT
Start: 2019-11-04 | End: 2019-11-06 | Stop reason: HOSPADM

## 2019-11-04 RX ORDER — HYDROMORPHONE HYDROCHLORIDE 1 MG/ML
INJECTION, SOLUTION INTRAMUSCULAR; INTRAVENOUS; SUBCUTANEOUS
Status: COMPLETED
Start: 2019-11-04 | End: 2019-11-04

## 2019-11-04 RX ORDER — ALBUTEROL SULFATE 90 UG/1
2 AEROSOL, METERED RESPIRATORY (INHALATION)
Status: DISCONTINUED | OUTPATIENT
Start: 2019-11-04 | End: 2019-11-04 | Stop reason: CLARIF

## 2019-11-04 RX ORDER — FENTANYL CITRATE 50 UG/ML
50 INJECTION, SOLUTION INTRAMUSCULAR; INTRAVENOUS AS NEEDED
Status: DISCONTINUED | OUTPATIENT
Start: 2019-11-04 | End: 2019-11-04 | Stop reason: HOSPADM

## 2019-11-04 RX ORDER — ONDANSETRON 2 MG/ML
4 INJECTION INTRAMUSCULAR; INTRAVENOUS
Status: DISCONTINUED | OUTPATIENT
Start: 2019-11-04 | End: 2019-11-06 | Stop reason: HOSPADM

## 2019-11-04 RX ORDER — HYDROCODONE BITARTRATE AND ACETAMINOPHEN 5; 325 MG/1; MG/1
1 TABLET ORAL
Status: DISCONTINUED | OUTPATIENT
Start: 2019-11-04 | End: 2019-11-06 | Stop reason: HOSPADM

## 2019-11-04 RX ORDER — GUAIFENESIN 100 MG/5ML
81 LIQUID (ML) ORAL DAILY
Status: DISCONTINUED | OUTPATIENT
Start: 2019-11-05 | End: 2019-11-06 | Stop reason: HOSPADM

## 2019-11-04 RX ORDER — VECURONIUM BROMIDE FOR INJECTION 1 MG/ML
INJECTION, POWDER, LYOPHILIZED, FOR SOLUTION INTRAVENOUS AS NEEDED
Status: DISCONTINUED | OUTPATIENT
Start: 2019-11-04 | End: 2019-11-04 | Stop reason: HOSPADM

## 2019-11-04 RX ORDER — LABETALOL HCL 20 MG/4 ML
5-15 SYRINGE (ML) INTRAVENOUS
Status: DISCONTINUED | OUTPATIENT
Start: 2019-11-04 | End: 2019-11-06 | Stop reason: HOSPADM

## 2019-11-04 RX ORDER — CEFAZOLIN SODIUM 2 G/50ML
2 SOLUTION INTRAVENOUS
Status: COMPLETED | OUTPATIENT
Start: 2019-11-04 | End: 2019-11-04

## 2019-11-04 RX ORDER — FENTANYL CITRATE 50 UG/ML
INJECTION, SOLUTION INTRAMUSCULAR; INTRAVENOUS AS NEEDED
Status: DISCONTINUED | OUTPATIENT
Start: 2019-11-04 | End: 2019-11-04 | Stop reason: HOSPADM

## 2019-11-04 RX ORDER — ONDANSETRON 2 MG/ML
INJECTION INTRAMUSCULAR; INTRAVENOUS AS NEEDED
Status: DISCONTINUED | OUTPATIENT
Start: 2019-11-04 | End: 2019-11-04 | Stop reason: HOSPADM

## 2019-11-04 RX ORDER — MAGNESIUM SULFATE 100 %
4 CRYSTALS MISCELLANEOUS AS NEEDED
Status: DISCONTINUED | OUTPATIENT
Start: 2019-11-04 | End: 2019-11-04 | Stop reason: HOSPADM

## 2019-11-04 RX ORDER — FAMOTIDINE 20 MG/1
20 TABLET, FILM COATED ORAL ONCE
Status: COMPLETED | OUTPATIENT
Start: 2019-11-04 | End: 2019-11-04

## 2019-11-04 RX ORDER — SODIUM CHLORIDE, SODIUM LACTATE, POTASSIUM CHLORIDE, CALCIUM CHLORIDE 600; 310; 30; 20 MG/100ML; MG/100ML; MG/100ML; MG/100ML
50 INJECTION, SOLUTION INTRAVENOUS CONTINUOUS
Status: DISCONTINUED | OUTPATIENT
Start: 2019-11-04 | End: 2019-11-04 | Stop reason: HOSPADM

## 2019-11-04 RX ORDER — LIDOCAINE HYDROCHLORIDE 10 MG/ML
0.1 INJECTION, SOLUTION EPIDURAL; INFILTRATION; INTRACAUDAL; PERINEURAL AS NEEDED
Status: DISCONTINUED | OUTPATIENT
Start: 2019-11-04 | End: 2019-11-04 | Stop reason: HOSPADM

## 2019-11-04 RX ORDER — LABETALOL HYDROCHLORIDE 5 MG/ML
INJECTION, SOLUTION INTRAVENOUS AS NEEDED
Status: DISCONTINUED | OUTPATIENT
Start: 2019-11-04 | End: 2019-11-04 | Stop reason: HOSPADM

## 2019-11-04 RX ORDER — LOSARTAN POTASSIUM 50 MG/1
50 TABLET ORAL DAILY
Status: DISCONTINUED | OUTPATIENT
Start: 2019-11-05 | End: 2019-11-06 | Stop reason: HOSPADM

## 2019-11-04 RX ORDER — BUPROPION HYDROCHLORIDE 100 MG/1
100 TABLET, EXTENDED RELEASE ORAL 2 TIMES DAILY
Status: DISCONTINUED | OUTPATIENT
Start: 2019-11-04 | End: 2019-11-05

## 2019-11-04 RX ORDER — GABAPENTIN 300 MG/1
300 CAPSULE ORAL 3 TIMES DAILY
Status: DISCONTINUED | OUTPATIENT
Start: 2019-11-04 | End: 2019-11-05

## 2019-11-04 RX ORDER — DEXAMETHASONE SODIUM PHOSPHATE 4 MG/ML
INJECTION, SOLUTION INTRA-ARTICULAR; INTRALESIONAL; INTRAMUSCULAR; INTRAVENOUS; SOFT TISSUE AS NEEDED
Status: DISCONTINUED | OUTPATIENT
Start: 2019-11-04 | End: 2019-11-04 | Stop reason: HOSPADM

## 2019-11-04 RX ORDER — QUETIAPINE FUMARATE 25 MG/1
50 TABLET, FILM COATED ORAL DAILY
Status: DISCONTINUED | OUTPATIENT
Start: 2019-11-05 | End: 2019-11-05

## 2019-11-04 RX ORDER — CLONAZEPAM 0.5 MG/1
1 TABLET ORAL 2 TIMES DAILY
Status: DISCONTINUED | OUTPATIENT
Start: 2019-11-04 | End: 2019-11-06 | Stop reason: HOSPADM

## 2019-11-04 RX ORDER — SODIUM CHLORIDE, SODIUM LACTATE, POTASSIUM CHLORIDE, CALCIUM CHLORIDE 600; 310; 30; 20 MG/100ML; MG/100ML; MG/100ML; MG/100ML
25 INJECTION, SOLUTION INTRAVENOUS CONTINUOUS
Status: DISCONTINUED | OUTPATIENT
Start: 2019-11-04 | End: 2019-11-04 | Stop reason: HOSPADM

## 2019-11-04 RX ORDER — PROPOFOL 10 MG/ML
INJECTION, EMULSION INTRAVENOUS AS NEEDED
Status: DISCONTINUED | OUTPATIENT
Start: 2019-11-04 | End: 2019-11-04 | Stop reason: HOSPADM

## 2019-11-04 RX ORDER — EPHEDRINE SULFATE/0.9% NACL/PF 50 MG/5 ML
SYRINGE (ML) INTRAVENOUS AS NEEDED
Status: DISCONTINUED | OUTPATIENT
Start: 2019-11-04 | End: 2019-11-04 | Stop reason: HOSPADM

## 2019-11-04 RX ORDER — HYDROMORPHONE HYDROCHLORIDE 1 MG/ML
1 INJECTION, SOLUTION INTRAMUSCULAR; INTRAVENOUS; SUBCUTANEOUS
Status: DISCONTINUED | OUTPATIENT
Start: 2019-11-04 | End: 2019-11-05

## 2019-11-04 RX ORDER — NITROGLYCERIN 0.4 MG/1
0.4 TABLET SUBLINGUAL
Status: DISCONTINUED | OUTPATIENT
Start: 2019-11-04 | End: 2019-11-06 | Stop reason: HOSPADM

## 2019-11-04 RX ORDER — ACETAMINOPHEN 325 MG/1
650 TABLET ORAL
Status: DISCONTINUED | OUTPATIENT
Start: 2019-11-04 | End: 2019-11-06 | Stop reason: HOSPADM

## 2019-11-04 RX ORDER — DOPAMINE HYDROCHLORIDE 160 MG/100ML
1-10 INJECTION, SOLUTION INTRAVENOUS
Status: DISCONTINUED | OUTPATIENT
Start: 2019-11-04 | End: 2019-11-05

## 2019-11-04 RX ORDER — CEFAZOLIN SODIUM 2 G/50ML
2 SOLUTION INTRAVENOUS EVERY 8 HOURS
Status: COMPLETED | OUTPATIENT
Start: 2019-11-04 | End: 2019-11-05

## 2019-11-04 RX ORDER — CARVEDILOL 6.25 MG/1
6.25 TABLET ORAL 2 TIMES DAILY WITH MEALS
Status: DISCONTINUED | OUTPATIENT
Start: 2019-11-04 | End: 2019-11-06 | Stop reason: HOSPADM

## 2019-11-04 RX ORDER — SUCCINYLCHOLINE CHLORIDE 100 MG/5ML
SYRINGE (ML) INTRAVENOUS AS NEEDED
Status: DISCONTINUED | OUTPATIENT
Start: 2019-11-04 | End: 2019-11-04 | Stop reason: HOSPADM

## 2019-11-04 RX ORDER — NALOXONE HYDROCHLORIDE 0.4 MG/ML
0.4 INJECTION, SOLUTION INTRAMUSCULAR; INTRAVENOUS; SUBCUTANEOUS AS NEEDED
Status: DISCONTINUED | OUTPATIENT
Start: 2019-11-04 | End: 2019-11-06 | Stop reason: HOSPADM

## 2019-11-04 RX ORDER — LIDOCAINE HYDROCHLORIDE 20 MG/ML
INJECTION, SOLUTION EPIDURAL; INFILTRATION; INTRACAUDAL; PERINEURAL AS NEEDED
Status: DISCONTINUED | OUTPATIENT
Start: 2019-11-04 | End: 2019-11-04 | Stop reason: HOSPADM

## 2019-11-04 RX ORDER — SODIUM CHLORIDE 0.9 % (FLUSH) 0.9 %
5-40 SYRINGE (ML) INJECTION AS NEEDED
Status: DISCONTINUED | OUTPATIENT
Start: 2019-11-04 | End: 2019-11-06 | Stop reason: HOSPADM

## 2019-11-04 RX ADMIN — HYDROMORPHONE HYDROCHLORIDE 0.5 MG: 1 INJECTION, SOLUTION INTRAMUSCULAR; INTRAVENOUS; SUBCUTANEOUS at 12:03

## 2019-11-04 RX ADMIN — Medication 10 MG: at 09:28

## 2019-11-04 RX ADMIN — VECURONIUM BROMIDE FOR INJECTION 1 MG: 1 INJECTION, POWDER, LYOPHILIZED, FOR SOLUTION INTRAVENOUS at 11:24

## 2019-11-04 RX ADMIN — Medication 10 MG: at 09:08

## 2019-11-04 RX ADMIN — DOPAMINE HYDROCHLORIDE 5 MCG/KG/MIN: 160 INJECTION, SOLUTION INTRAVENOUS at 10:02

## 2019-11-04 RX ADMIN — HYDROMORPHONE HYDROCHLORIDE 0.5 MG: 1 INJECTION, SOLUTION INTRAMUSCULAR; INTRAVENOUS; SUBCUTANEOUS at 12:10

## 2019-11-04 RX ADMIN — LIDOCAINE HYDROCHLORIDE 60 MG: 20 INJECTION, SOLUTION INTRAVENOUS at 08:07

## 2019-11-04 RX ADMIN — CEFAZOLIN 2 G: 10 INJECTION, POWDER, FOR SOLUTION INTRAVENOUS at 17:22

## 2019-11-04 RX ADMIN — LABETALOL HYDROCHLORIDE 10 MG: 5 INJECTION, SOLUTION INTRAVENOUS at 11:56

## 2019-11-04 RX ADMIN — PROPOFOL 30 MG: 10 INJECTION, EMULSION INTRAVENOUS at 10:23

## 2019-11-04 RX ADMIN — Medication 10 MG: at 08:21

## 2019-11-04 RX ADMIN — SODIUM CHLORIDE, SODIUM LACTATE, POTASSIUM CHLORIDE, AND CALCIUM CHLORIDE: 600; 310; 30; 20 INJECTION, SOLUTION INTRAVENOUS at 11:59

## 2019-11-04 RX ADMIN — PROPOFOL 170 MG: 10 INJECTION, EMULSION INTRAVENOUS at 08:07

## 2019-11-04 RX ADMIN — HYDROMORPHONE HYDROCHLORIDE 0.5 MG: 1 INJECTION, SOLUTION INTRAMUSCULAR; INTRAVENOUS; SUBCUTANEOUS at 12:41

## 2019-11-04 RX ADMIN — VECURONIUM BROMIDE FOR INJECTION 3 MG: 1 INJECTION, POWDER, LYOPHILIZED, FOR SOLUTION INTRAVENOUS at 08:13

## 2019-11-04 RX ADMIN — SUGAMMADEX 400 MG: 100 INJECTION, SOLUTION INTRAVENOUS at 11:52

## 2019-11-04 RX ADMIN — DEXAMETHASONE SODIUM PHOSPHATE 4 MG: 4 INJECTION, SOLUTION INTRA-ARTICULAR; INTRALESIONAL; INTRAMUSCULAR; INTRAVENOUS; SOFT TISSUE at 08:55

## 2019-11-04 RX ADMIN — HEPARIN SODIUM 2000 UNITS: 1000 INJECTION, SOLUTION INTRAVENOUS; SUBCUTANEOUS at 10:14

## 2019-11-04 RX ADMIN — VECURONIUM BROMIDE FOR INJECTION 1 MG: 1 INJECTION, POWDER, LYOPHILIZED, FOR SOLUTION INTRAVENOUS at 09:24

## 2019-11-04 RX ADMIN — MIDAZOLAM 2 MG: 1 INJECTION INTRAMUSCULAR; INTRAVENOUS at 08:02

## 2019-11-04 RX ADMIN — HYDROMORPHONE HYDROCHLORIDE 0.5 MG: 1 INJECTION, SOLUTION INTRAMUSCULAR; INTRAVENOUS; SUBCUTANEOUS at 11:58

## 2019-11-04 RX ADMIN — SODIUM CHLORIDE: 900 INJECTION, SOLUTION INTRAVENOUS at 08:26

## 2019-11-04 RX ADMIN — Medication 10 MG: at 09:55

## 2019-11-04 RX ADMIN — SODIUM CHLORIDE 75 ML/HR: 900 INJECTION, SOLUTION INTRAVENOUS at 14:47

## 2019-11-04 RX ADMIN — FENTANYL CITRATE 50 MCG: 50 INJECTION, SOLUTION INTRAMUSCULAR; INTRAVENOUS at 10:35

## 2019-11-04 RX ADMIN — HYDROMORPHONE HYDROCHLORIDE 1 MG: 1 INJECTION, SOLUTION INTRAMUSCULAR; INTRAVENOUS; SUBCUTANEOUS at 08:43

## 2019-11-04 RX ADMIN — LABETALOL 20 MG/4 ML (5 MG/ML) INTRAVENOUS SYRINGE 5 MG: at 15:23

## 2019-11-04 RX ADMIN — VECURONIUM BROMIDE FOR INJECTION 2 MG: 1 INJECTION, POWDER, LYOPHILIZED, FOR SOLUTION INTRAVENOUS at 08:51

## 2019-11-04 RX ADMIN — HYDROMORPHONE HYDROCHLORIDE 0.5 MG: 1 INJECTION, SOLUTION INTRAMUSCULAR; INTRAVENOUS; SUBCUTANEOUS at 12:26

## 2019-11-04 RX ADMIN — CLONAZEPAM 1 MG: 0.5 TABLET ORAL at 17:24

## 2019-11-04 RX ADMIN — VECURONIUM BROMIDE FOR INJECTION 1 MG: 1 INJECTION, POWDER, LYOPHILIZED, FOR SOLUTION INTRAVENOUS at 09:56

## 2019-11-04 RX ADMIN — FAMOTIDINE 20 MG: 20 TABLET, FILM COATED ORAL at 06:50

## 2019-11-04 RX ADMIN — LABETALOL HYDROCHLORIDE 10 MG: 5 INJECTION, SOLUTION INTRAVENOUS at 11:50

## 2019-11-04 RX ADMIN — VECURONIUM BROMIDE FOR INJECTION 1 MG: 1 INJECTION, POWDER, LYOPHILIZED, FOR SOLUTION INTRAVENOUS at 10:29

## 2019-11-04 RX ADMIN — HYDROCODONE BITARTRATE AND ACETAMINOPHEN 1 TABLET: 5; 325 TABLET ORAL at 19:44

## 2019-11-04 RX ADMIN — SODIUM CHLORIDE, SODIUM LACTATE, POTASSIUM CHLORIDE, AND CALCIUM CHLORIDE 25 ML/HR: 600; 310; 30; 20 INJECTION, SOLUTION INTRAVENOUS at 06:50

## 2019-11-04 RX ADMIN — LABETALOL 20 MG/4 ML (5 MG/ML) INTRAVENOUS SYRINGE 15 MG: at 18:28

## 2019-11-04 RX ADMIN — GABAPENTIN 300 MG: 300 CAPSULE ORAL at 17:25

## 2019-11-04 RX ADMIN — VECURONIUM BROMIDE FOR INJECTION 1 MG: 1 INJECTION, POWDER, LYOPHILIZED, FOR SOLUTION INTRAVENOUS at 11:03

## 2019-11-04 RX ADMIN — FENTANYL CITRATE 100 MCG: 50 INJECTION, SOLUTION INTRAMUSCULAR; INTRAVENOUS at 08:07

## 2019-11-04 RX ADMIN — Medication 10 ML: at 14:50

## 2019-11-04 RX ADMIN — Medication 100 MG: at 08:07

## 2019-11-04 RX ADMIN — VECURONIUM BROMIDE FOR INJECTION 1 MG: 1 INJECTION, POWDER, LYOPHILIZED, FOR SOLUTION INTRAVENOUS at 08:07

## 2019-11-04 RX ADMIN — HEPARIN SODIUM 8000 UNITS: 1000 INJECTION, SOLUTION INTRAVENOUS; SUBCUTANEOUS at 09:57

## 2019-11-04 RX ADMIN — CEFAZOLIN SODIUM 2 G: 2 SOLUTION INTRAVENOUS at 08:43

## 2019-11-04 RX ADMIN — CARVEDILOL 6.25 MG: 6.25 TABLET, FILM COATED ORAL at 17:24

## 2019-11-04 RX ADMIN — FENTANYL CITRATE 50 MCG: 50 INJECTION, SOLUTION INTRAMUSCULAR; INTRAVENOUS at 10:20

## 2019-11-04 RX ADMIN — Medication 10 ML: at 22:22

## 2019-11-04 RX ADMIN — ONDANSETRON 4 MG: 2 SOLUTION INTRAMUSCULAR; INTRAVENOUS at 11:32

## 2019-11-04 NOTE — PROGRESS NOTES
conducted an initial consultation and Spiritual Assessment for Lizzy Tilley, who is a 48 y.o.,female. According to the patients chart Orthodox Affiliation is: [facility].     The reason the Patient came to the hospital is:   Patient Active Problem List    Diagnosis Date Noted    PVD (peripheral vascular disease) ([facility] [address] 11/04/2019    Status post left heart catheterization (LHC) 08/16/2019    Anemia 12/22/2018    Atherosclerotic PVD with intermittent claudication ([facility] [address] 12/20/2018    Hypokalemia 12/20/2018    CAD (coronary artery disease) 12/20/2018    DM (diabetes mellitus) ([facility] [address] 12/20/2018    HTN (hypertension) 12/20/2018    Depression 12/20/2018    Current smoker 12/20/2018        The  provided the following Interventions:  Initiated a relationship of care and support. Listened empathically. Provided information about Spiritual Care Services. Offered prayer and assurance of continued prayers on patients behalf. Chart reviewed. The following outcomes were achieved:  Patient shared limited information about their medical narrative, spiritual journey and beliefs. Patient processed feelings about current hospitalization. Patient expressed gratitude for Spiritual Care visit. Assessment:  There are no significant spiritual or Baptist issues which require further intervention at this time. Patient does not have any Baptist or cultural needs that will affect patients preferences in health care. Plan:  Chaplains will continue to follow and will provide pastoral care as needed or requested.  recommends bedside caregivers page  on duty if patient shows signs of acute spiritual or emotional distress. [address]   [address] [address] - Office

## 2019-11-04 NOTE — BRIEF OP NOTE
BRIEF OPERATIVE NOTE    Date of Procedure: 11/4/2019   Preoperative Diagnosis: atherosclerosis  I73.9  Severe LLE PVD  Postoperative Diagnosis: atherosclerosis  i73.9  Same as pre op  Procedure(s):  left common femoral artery to upper anterior tibial artery bypass with cryovein  Surgeon(s) and Role:     * Lake Abbott MD - Primary         Surgical Assistant: Cam Aquino MD    Surgical Staff:  Circ-1: Donell Dowd RN  Scrub Tech-1: Natalie Sade  Surg Asst-1: Zelalem Leon  Surg Asst-Relief: Imelda Aguiar  Event Time In Time Out   Incision Start 9061    Incision Close 1140      Anesthesia: General / GETA  Estimated Blood Loss: 100ml  Fluids: 1350ml saline  Pre op abx: ancef 2 gram IV  Specimens: none  Findings: strongly audible L AT at the ankle - known DP disease, easily palpable graft pulse  Complications: none  Heparin: 10,000 units IV  Implants:   Implant Name Type Inv.  Item Serial No.  Lot No. LRB No. Used Action   SAPHENOUS VEIN   [id number] [employer] N/A Left 1 Implanted

## 2019-11-04 NOTE — PERIOP NOTES
[id number] Arrived to [facility]. Monitors attached. VSS.      1311 TRANSFER - OUT REPORT:    Verbal report given to [address] [place] [employer]  being transferred to 2600(unit) for routine progression of care       Report consisted of patients Situation, Background, Assessment and   Recommendations(SBAR). Information from the following report(s) SBAR, Procedure Summary, Intake/Output and Recent Results was reviewed with the receiving nurse. Lines:   Peripheral IV 11/04/19 Left Arm (Active)   Site Assessment Clean, dry, & intact 11/4/2019  6:50 AM   Phlebitis Assessment 0 11/4/2019  6:50 AM   Infiltration Assessment 0 11/4/2019  6:50 AM   Dressing Status Clean, dry, & intact 11/4/2019  6:50 AM   Dressing Type Transparent;Tape 11/4/2019  6:50 AM       Peripheral IV 11/04/19 Right Hand (Active)       Arterial Line 11/04/19 Left Radial artery (Active)        Opportunity for questions and clarification was provided.       Patient transported with:   Registered Nurse  Tech

## 2019-11-04 NOTE — CONSULTS
History and Physical    Patient: Murphy Noriega               Sex: female          DOA: 11/4/2019       YOB: 1965      Age:  48 y.o.        LOS:  LOS: 0 days        HPI:     Murphy Noriega is a 48 y.o. female who was admitted by the Vascular Surgery service for bypass surgery of her left leg. She has had worsening pain involving the left leg for many months. She has a history of CAD and has had three stents placed. The most recent stent was in August of this year. She does not currently have chest pain or SOB. Her peripheral arterial disease has failed medical management and she has had bypass surgery which she has tolerated well. We are consulted for medical management. Past Medical History:   Diagnosis Date    Arthritis     Asthma     CAD (coronary artery disease)     S/P LAD 2.25 x 38 mm and 2.5 x 12 mm SYNERGY (2016), OM 2.25 X 18 mm BMS (08/19)    Cardiomyopathy ([facility]     Depression     Diabetes ([facility]     Hypertension     PAD [facility]peripheral artery [facility]) [facility]     S/P LE angioplasty and stent, LLE bypass    Tobacco abuse     Unspecified sleep apnea        Social History:   Tobacco use:  Patient is a long term smoker   Alcohol use:  Patient occasionally uses alcohol    Family History:   Multiple family members with HTN    Review of Systems    Constitutional:  No fever or weight loss  HEENT:  No headache or visual changes  Cardiovascular:  No chest pain or diaphoresis  Respiratory:  No coughing, wheezing, or shortness of breath. GI:  No nausea or vomitting. No diarrhea  :  No hematuria or dysuria  Skin:  No rashes or moles  Neuro:  No seizures or syncope  Extremities:  Left leg pain as above  Hematological:  No bruising or bleeding  Endocrine:  Patient has known diabetes, no thyroid disease    Physical Exam:      Visit Vitals  /80   Pulse 65   Temp 98.3 °F (36.8 °C)   Resp 10   Ht 5' 5\" (1.651 m)   Wt 80.6 kg (177 lb 11.1 oz)   SpO2 99%   Breastfeeding?  No   BMI 29.57 kg/m²       Physical Exam:    Gen:  No distress, alert  HEENT:  Normal cephalic atraumatic, extra-occular movements are intact. Neck:  Supple, No JVD  Lungs:  Clear bilaterally, no wheeze, no rales, normal effort  Heart:  Regular Rate and Rhythm, normal S1 and S2, no edema  Abdomen:  Soft, non tender, normal bowel sounds, no guarding. Extremities:  Well perfused, no cyanosis or edema  Neurological:  Awake and alert, CN's are intact, normal strength throughout extremities  Skin:  No rashes or moles  Mental Status:  Normal thought process, does not appear anxious    Laboratory Studies: All lab results for the last 24 hours reviewed. Assessment/Plan     Active Problems:    PVD (peripheral vascular disease) ([facility] (11/4/2019)    S/P bypass LLE  CAD  HTN  Diabetes  Hyperlipidemia    PLAN:    BP control in ICU  BS control  Follow renal function  Monitor HR and cardiac rhythm  Mobilize when able according to Vascular surgery  Discussed with patient and Mother at the bedside.

## 2019-11-04 NOTE — ANESTHESIA PROCEDURE NOTES
Arterial Line Placement    Start time: 11/4/2019 8:45 AM  End time: 11/4/2019 8:55 AM  Performed by: Dane Casper MD  Authorized by:  Dane Casper MD     Pre-Procedure  Preanesthetic Checklist: patient identified, risks and benefits discussed, anesthesia consent, site marked, patient being monitored, timeout performed and patient being monitored      Procedure:   Prep:  Chlorhexidine  Seldinger Technique?: Yes    Orientation:  Left  Location:  Radial artery  Catheter size:  20 G  Number of attempts:  2  Cont Cardiac Output Sensor: No      Assessment:   Post-procedure:  Line secured  Patient Tolerance:  Patient tolerated the procedure well with no immediate complications

## 2019-11-04 NOTE — PERIOP NOTES
Pre-Op Summary    Pt arrived via car with family/friend and is oriented to time, place, person and situation. Patient with steady gait with none assistive devices. Visit Vitals  /67 (BP 1 Location: Left arm, BP Patient Position: At rest)   Pulse 63   Temp 97.5 °F (36.4 °C)   Resp 16   Ht 5' 5\" (1.651 m)   Wt 75.3 kg (166 lb)   SpO2 100%   BMI 27.62 kg/m²       Peripheral IV located on Left forearm. Patients belongings are [facility] with [facility]. Patient's point of contact is [facility] and their contact number is: [phone]. They will be in the waiting room. They are able to receive medication information. They will be admitted.

## 2019-11-04 NOTE — INTERVAL H&P NOTE
H&P Update:  Ade Arrington was seen and examined. No change in history or exam - LLE severe claudication. Stopped brilinta 3 days. ASA continued. No chest pain.

## 2019-11-04 NOTE — ANESTHESIA PREPROCEDURE EVALUATION
Relevant Problems   No relevant active problems       Anesthetic History   No history of anesthetic complications            Review of Systems / Medical History  Patient summary reviewed and pertinent labs reviewed    Pulmonary    COPD    Sleep apnea  Smoker         Neuro/Psych         TIA and psychiatric history     Cardiovascular    Hypertension          Past MI, CAD and cardiac stents         GI/Hepatic/Renal  Within defined limits              Endo/Other    Diabetes: type 2    Arthritis and anemia     Other Findings              Physical Exam    Airway  Mallampati: III  TM Distance: 4 - 6 cm  Neck ROM: normal range of motion   Mouth opening: Diminished (comment)     Cardiovascular    Rhythm: regular  Rate: normal         Dental    Dentition: Poor dentition     Pulmonary  Breath sounds clear to auscultation               Abdominal  GI exam deferred       Other Findings            Anesthetic Plan    ASA: 3  Anesthesia type: general    Monitoring Plan: Arterial line      Induction: Intravenous  Anesthetic plan and risks discussed with: Patient

## 2019-11-05 LAB
ANION GAP SERPL CALC-SCNC: 6 MMOL/L (ref 3–18)
BUN SERPL-MCNC: 12 MG/DL (ref 7–18)
BUN/CREAT SERPL: 17 (ref 12–20)
CALCIUM SERPL-MCNC: 7.9 MG/DL (ref 8.5–10.1)
CHLORIDE SERPL-SCNC: 110 MMOL/L (ref 100–111)
CO2 SERPL-SCNC: 28 MMOL/L (ref 21–32)
CREAT SERPL-MCNC: 0.7 MG/DL (ref 0.6–1.3)
ERYTHROCYTE [DISTWIDTH] IN BLOOD BY AUTOMATED COUNT: 15.9 % (ref 11.6–14.5)
EST. AVERAGE GLUCOSE BLD GHB EST-MCNC: 123 MG/DL
GLUCOSE BLD STRIP.AUTO-MCNC: 122 MG/DL (ref 70–110)
GLUCOSE BLD STRIP.AUTO-MCNC: 125 MG/DL (ref 70–110)
GLUCOSE BLD STRIP.AUTO-MCNC: 155 MG/DL (ref 70–110)
GLUCOSE SERPL-MCNC: 94 MG/DL (ref 74–99)
HBA1C MFR BLD: 5.9 % (ref 4.2–5.6)
HCT VFR BLD AUTO: 30.6 % (ref 35–45)
HGB BLD-MCNC: 9.1 G/DL (ref 12–16)
MCH RBC QN AUTO: 21.1 PG (ref 24–34)
MCHC RBC AUTO-ENTMCNC: 29.7 G/DL (ref 31–37)
MCV RBC AUTO: 70.8 FL (ref 74–97)
PLATELET # BLD AUTO: 197 K/UL (ref 135–420)
PMV BLD AUTO: 11.3 FL (ref 9.2–11.8)
POTASSIUM SERPL-SCNC: 3.5 MMOL/L (ref 3.5–5.5)
RBC # BLD AUTO: 4.32 M/UL (ref 4.2–5.3)
SODIUM SERPL-SCNC: 144 MMOL/L (ref 136–145)
WBC # BLD AUTO: 4.6 K/UL (ref 4.6–13.2)

## 2019-11-05 PROCEDURE — 74011250637 HC RX REV CODE- 250/637: Performed by: SURGERY

## 2019-11-05 PROCEDURE — 74011250636 HC RX REV CODE- 250/636: Performed by: SURGERY

## 2019-11-05 PROCEDURE — 65270000029 HC RM PRIVATE

## 2019-11-05 PROCEDURE — 83036 HEMOGLOBIN GLYCOSYLATED A1C: CPT

## 2019-11-05 PROCEDURE — 82962 GLUCOSE BLOOD TEST: CPT

## 2019-11-05 PROCEDURE — 97116 GAIT TRAINING THERAPY: CPT

## 2019-11-05 PROCEDURE — 85027 COMPLETE CBC AUTOMATED: CPT

## 2019-11-05 PROCEDURE — 80048 BASIC METABOLIC PNL TOTAL CA: CPT

## 2019-11-05 PROCEDURE — 36600 WITHDRAWAL OF ARTERIAL BLOOD: CPT

## 2019-11-05 PROCEDURE — 97162 PT EVAL MOD COMPLEX 30 MIN: CPT

## 2019-11-05 RX ORDER — MAGNESIUM SULFATE 100 %
4 CRYSTALS MISCELLANEOUS AS NEEDED
Status: DISCONTINUED | OUTPATIENT
Start: 2019-11-05 | End: 2019-11-06 | Stop reason: HOSPADM

## 2019-11-05 RX ORDER — DEXTROSE MONOHYDRATE 100 MG/ML
125-250 INJECTION, SOLUTION INTRAVENOUS AS NEEDED
Status: DISCONTINUED | OUTPATIENT
Start: 2019-11-05 | End: 2019-11-06 | Stop reason: HOSPADM

## 2019-11-05 RX ORDER — HYDROMORPHONE HYDROCHLORIDE 1 MG/ML
0.5 INJECTION, SOLUTION INTRAMUSCULAR; INTRAVENOUS; SUBCUTANEOUS
Status: DISCONTINUED | OUTPATIENT
Start: 2019-11-05 | End: 2019-11-06 | Stop reason: HOSPADM

## 2019-11-05 RX ORDER — QUETIAPINE FUMARATE 50 MG/1
50 TABLET, EXTENDED RELEASE ORAL
Status: DISCONTINUED | OUTPATIENT
Start: 2019-11-05 | End: 2019-11-05

## 2019-11-05 RX ORDER — QUETIAPINE FUMARATE 25 MG/1
50 TABLET, FILM COATED ORAL EVERY EVENING
Status: DISCONTINUED | OUTPATIENT
Start: 2019-11-05 | End: 2019-11-05

## 2019-11-05 RX ORDER — BUPROPION HYDROCHLORIDE 100 MG/1
100 TABLET, EXTENDED RELEASE ORAL DAILY
Status: DISCONTINUED | OUTPATIENT
Start: 2019-11-06 | End: 2019-11-06 | Stop reason: HOSPADM

## 2019-11-05 RX ORDER — QUETIAPINE FUMARATE 25 MG/1
50 TABLET, FILM COATED ORAL
Status: DISCONTINUED | OUTPATIENT
Start: 2019-11-05 | End: 2019-11-06 | Stop reason: HOSPADM

## 2019-11-05 RX ORDER — GABAPENTIN 300 MG/1
300 CAPSULE ORAL 2 TIMES DAILY
Status: DISCONTINUED | OUTPATIENT
Start: 2019-11-05 | End: 2019-11-06 | Stop reason: HOSPADM

## 2019-11-05 RX ORDER — INSULIN LISPRO 100 [IU]/ML
INJECTION, SOLUTION INTRAVENOUS; SUBCUTANEOUS
Status: DISCONTINUED | OUTPATIENT
Start: 2019-11-05 | End: 2019-11-06 | Stop reason: HOSPADM

## 2019-11-05 RX ADMIN — HYDROCODONE BITARTRATE AND ACETAMINOPHEN 1 TABLET: 5; 325 TABLET ORAL at 17:09

## 2019-11-05 RX ADMIN — CEFAZOLIN 2 G: 10 INJECTION, POWDER, FOR SOLUTION INTRAVENOUS at 09:41

## 2019-11-05 RX ADMIN — ONDANSETRON 4 MG: 2 INJECTION INTRAMUSCULAR; INTRAVENOUS at 18:54

## 2019-11-05 RX ADMIN — CARVEDILOL 6.25 MG: 6.25 TABLET, FILM COATED ORAL at 17:10

## 2019-11-05 RX ADMIN — CLONAZEPAM 1 MG: 0.5 TABLET ORAL at 17:09

## 2019-11-05 RX ADMIN — ISOSORBIDE MONONITRATE 60 MG: 60 TABLET, EXTENDED RELEASE ORAL at 08:18

## 2019-11-05 RX ADMIN — Medication 10 ML: at 21:22

## 2019-11-05 RX ADMIN — CLONAZEPAM 1 MG: 0.5 TABLET ORAL at 08:18

## 2019-11-05 RX ADMIN — ASPIRIN 81 MG 81 MG: 81 TABLET ORAL at 08:20

## 2019-11-05 RX ADMIN — GABAPENTIN 300 MG: 300 CAPSULE ORAL at 17:10

## 2019-11-05 RX ADMIN — AMLODIPINE BESYLATE 2.5 MG: 2.5 TABLET ORAL at 08:18

## 2019-11-05 RX ADMIN — CEFAZOLIN 2 G: 10 INJECTION, POWDER, FOR SOLUTION INTRAVENOUS at 01:32

## 2019-11-05 RX ADMIN — HYDROMORPHONE HYDROCHLORIDE 1 MG: 1 INJECTION, SOLUTION INTRAMUSCULAR; INTRAVENOUS; SUBCUTANEOUS at 18:54

## 2019-11-05 RX ADMIN — Medication 10 ML: at 14:28

## 2019-11-05 RX ADMIN — HYDROCODONE BITARTRATE AND ACETAMINOPHEN 1 TABLET: 5; 325 TABLET ORAL at 08:53

## 2019-11-05 RX ADMIN — TICAGRELOR 90 MG: 90 TABLET ORAL at 08:53

## 2019-11-05 RX ADMIN — Medication 10 ML: at 06:03

## 2019-11-05 RX ADMIN — ATORVASTATIN CALCIUM 80 MG: 40 TABLET, FILM COATED ORAL at 08:19

## 2019-11-05 RX ADMIN — LOSARTAN POTASSIUM 50 MG: 50 TABLET, FILM COATED ORAL at 08:19

## 2019-11-05 RX ADMIN — SODIUM CHLORIDE 75 ML/HR: 900 INJECTION, SOLUTION INTRAVENOUS at 06:01

## 2019-11-05 NOTE — PROGRESS NOTES
Problem: Suicide  Goal: *STG: Remains safe in hospital  Outcome: Progressing Towards Goal  Goal: *STG: Seeks staff when feelings of self harm or harm towards others arise  Outcome: Progressing Towards Goal  Goal: *STG: Attends activities and groups  Outcome: Progressing Towards Goal  Goal: *STG:  Verbalizes alternative ways of dealing with maladaptive feelings/behaviors  Outcome: Progressing Towards Goal  Goal: *STG/LTG: Complies with medication therapy  Outcome: Progressing Towards Goal  Goal: *STG/LTG: No longer expresses self destructive or suicidal thoughts  Outcome: Progressing Towards Goal  Goal: *LTG:  Identifies available community resources  Outcome: Progressing Towards Goal  Goal: *LTG:  Develops proactive suicide prevention plan  Outcome: Progressing Towards Goal  Goal: Interventions  Outcome: Progressing Towards Goal     Problem: Patient Education: Go to Patient Education Activity  Goal: Patient/Family Education  Outcome: Progressing Towards Goal     Problem: Falls - Risk of  Goal: *Absence of Falls  Description  Document Teresa Ann Fall Risk and appropriate interventions in the flowsheet.   Outcome: Progressing Towards Goal  Note:   Fall Risk Interventions:  Mobility Interventions: Bed/chair exit alarm, Strengthening exercises (ROM-active/passive)    Mentation Interventions: Door open when patient unattended, More frequent rounding, Toileting rounds    Medication Interventions: Bed/chair exit alarm, Patient to call before getting OOB    Elimination Interventions: Bed/chair exit alarm, Call light in reach, Patient to call for help with toileting needs, Toileting schedule/hourly rounds    History of Falls Interventions: Door open when patient unattended         Problem: Patient Education: Go to Patient Education Activity  Goal: Patient/Family Education  Outcome: Progressing Towards Goal     Problem: Pain  Goal: *Control of Pain  Outcome: Progressing Towards Goal  Goal: *PALLIATIVE CARE:  Alleviation of Pain  Outcome: Progressing Towards Goal     Problem: Patient Education: Go to Patient Education Activity  Goal: Patient/Family Education  Outcome: Progressing Towards Goal     Problem: Pain  Goal: *Control of Pain  Outcome: Progressing Towards Goal     Problem: Infection - Risk of, Surgical Site Infection  Goal: *Absence of surgical site infection signs and symptoms  Outcome: Progressing Towards Goal

## 2019-11-05 NOTE — PROGRESS NOTES
Problem: Suicide  Goal: *STG: Remains safe in hospital  Outcome: Progressing Towards Goal  Goal: *STG: Seeks staff when feelings of self harm or harm towards others arise  Outcome: Progressing Towards Goal  Goal: *STG: Attends activities and groups  Outcome: Progressing Towards Goal  Goal: *STG:  Verbalizes alternative ways of dealing with maladaptive feelings/behaviors  Outcome: Progressing Towards Goal  Goal: *STG/LTG: Complies with medication therapy  Outcome: Progressing Towards Goal  Goal: *STG/LTG: No longer expresses self destructive or suicidal thoughts  Outcome: Progressing Towards Goal  Goal: *LTG:  Identifies available community resources  Outcome: Progressing Towards Goal  Goal: *LTG:  Develops proactive suicide prevention plan  Outcome: Progressing Towards Goal  Goal: Interventions  Outcome: Progressing Towards Goal     Problem: Patient Education: Go to Patient Education Activity  Goal: Patient/Family Education  Outcome: Progressing Towards Goal     Problem: Falls - Risk of  Goal: *Absence of Falls  Description  Document Samira Wylie Fall Risk and appropriate interventions in the flowsheet.   Outcome: Progressing Towards Goal  Note:   Fall Risk Interventions:  Mobility Interventions: Bed/chair exit alarm, OT consult for ADLs, Patient to call before getting OOB    Mentation Interventions: Adequate sleep, hydration, pain control, Door open when patient unattended, Bed/chair exit alarm    Medication Interventions: Bed/chair exit alarm, Teach patient to arise slowly, Patient to call before getting OOB    Elimination Interventions: Bed/chair exit alarm, Call light in reach, Stay With Me (per policy)    History of Falls Interventions: Bed/chair exit alarm, Door open when patient unattended, Room close to nurse's station         Problem: Patient Education: Go to Patient Education Activity  Goal: Patient/Family Education  Outcome: Progressing Towards Goal     Problem: Pain  Goal: *Control of Pain  Outcome: Progressing Towards Goal  Goal: *PALLIATIVE CARE:  Alleviation of Pain  Outcome: Progressing Towards Goal     Problem: Patient Education: Go to Patient Education Activity  Goal: Patient/Family Education  Outcome: Progressing Towards Goal     Problem: Pain  Goal: *Control of Pain  Outcome: Progressing Towards Goal     Problem: Infection - Risk of, Surgical Site Infection  Goal: *Absence of surgical site infection signs and symptoms  Outcome: Progressing Towards Goal     Problem: LE Bypass: Post-Op Day 1  Goal: Off Pathway (Use only if patient is Off Pathway)  Outcome: Progressing Towards Goal  Goal: Activity/Safety  Outcome: Progressing Towards Goal  Goal: Consults, if ordered  Outcome: Progressing Towards Goal  Goal: Diagnostic Test/Procedures  Outcome: Progressing Towards Goal  Goal: Nutrition/Diet  Outcome: Progressing Towards Goal  Goal: Discharge Planning  Outcome: Progressing Towards Goal  Goal: Medications  Outcome: Progressing Towards Goal  Goal: Respiratory  Outcome: Progressing Towards Goal  Goal: Treatments/Interventions/Procedures  Outcome: Progressing Towards Goal  Goal: Psychosocial  Outcome: Progressing Towards Goal  Goal: *Lungs clear or at baseline  Outcome: Progressing Towards Goal  Goal: *Hemodynamically stable  Outcome: Progressing Towards Goal  Goal: *Optimal pain control at patient's stated goal  Outcome: Progressing Towards Goal  Goal: *Tolerating diet  Outcome: Progressing Towards Goal  Goal: *Demonstrates progressive activity  Outcome: Progressing Towards Goal  Goal: *Adequate urinary output (equal to or greater than 30 milliliters/hour)  Outcome: Progressing Towards Goal     Problem: Patient Education: Go to Patient Education Activity  Goal: Patient/Family Education  Outcome: Progressing Towards Goal     Problem: Discharge Planning  Goal: *Discharge to safe environment  Outcome: Progressing Towards Goal

## 2019-11-05 NOTE — DIABETES MGMT
GLYCEMIC CONTROL AND NUTRITION    Assessment/Recommendations:  Pt is 142% ideal weight with BMI = 29.5 kg/m2 (overweight classification). Pt appears well nourished and po intake is adequate. Good glycemic control at this time. Last A1C seen is from 6/2019. Recommend:  1.  obtaining updated A1C  2.  corrective lispro ACHS (normal insulin sensitivity) per protocol. 3.  adjust diet to 2574-9833 calorie consistent CHO for optimal glycemic and weight control. Most recent blood glucose values:  11/5:  lab - 94  11/4:  POC - 100, 140, 144 (received decadron during surgery)     Last  A1C of 6.4 % (from 6-5-19) is equivalent to average blood glucose of 132 mg/dl over the past 2-3 months. Current hospital diabetes medications: none     Previous day's insulin requirements: none    Home diabetes medications: per chart review - of note, pt confirmed she takes Trajenta but states she also takes another DM \"pill\" that starts with  \"b\" - do not see any additional DM medications listed in chart review.     linagliptin (Trajenta) - 5 mg/d    Diet:  Consistent CHO     Patient Vitals for the past 168 hrs:   % Diet Eaten   11/04/19 1853 75 %     Height - 5'5\"    Last Weight Metrics:  Weight Loss Metrics 11/4/2019 9/24/2019 8/16/2019 8/10/2019 8/6/2019 6/24/2019 1/4/2019   Today's Wt 177 lb 11.1 oz 166 lb 168 lb 169 lb 169 lb 164 lb 8 oz 165 lb   BMI 29.57 kg/m2 27.62 kg/m2 27.96 kg/m2 28.12 kg/m2 28.12 kg/m2 27.37 kg/m2 27.46 kg/m2     Ideal weight - 125 lbs      Education:  ____Refer to Diabetes Education Record             _x___Education not indicated at this time    Jodi Jett, [address] [place] [address], [facility]   Office:  [address] [place] [address] Pager:  [phone]

## 2019-11-05 NOTE — OP NOTES
[facility]  OPERATIVE REPORT    Name:  Marc Mckeon  MR#:   [id number]  :  1965  ACCOUNT #:  [de-identified]  DATE OF SERVICE:  2019    PREOPERATIVE DIAGNOSIS:  Severe left lower extremity peripheral vascular disease. POSTOPERATIVE DIAGNOSIS:  Severe left lower extremity peripheral vascular disease. PROCEDURE PERFORMED:  Left common femoral artery to upper anterior tibial artery bypass with preserved CryoVein. SURGEON:  Tracy Bonner MD    SURGICAL :  July Hilario MD    SURGICAL ASSISTANT:  Norbert Costello. INCISION STARTED:  08:50. INCISION CLOSED:  11:40. ANESTHESIA:  General endotracheal anesthesia. COMPLICATIONS:  None. HEPARIN:  10,000 units total.    SPECIMENS REMOVED:  None. IMPLANTS:  [address]. saphenous vein. ESTIMATED BLOOD LOSS:  100 mL. FLUIDS:  1350 mL of saline. PREOPERATIVE ANTIBIOTICS:  Ancef 2 g IV. FINDINGS:  Strongly audible left anterior tibial artery at the angle with known peripheral vascular disease and softly palpable dorsalis pedis pulse in recovery and also an easily palpable graft pulse that had biphasic Doppler signal.    HISTORY OF PRESENT ILLNESS:  This is a 72-year-old female with severe peripheral vascular disease with a prior composite PTFE greater saphenous vein femoral to above-the-knee bypass that has failed, now with severe peripheral vascular disease with toe-brachial indices of 0.18. She now presents for the above procedure. DESCRIPTION OF THE PROCEDURE:  After consent was obtained, the patient was taken to the operating room. After satisfactory induction of general anesthesia, the left leg was prepped from the toes to the groin with ChloraPrep, allowed to dry appropriately, and the procedure time-out was performed.   Initially, approximately 3-inch longitudinal incision was made from the inguinal ligament distally over the femoral pulse through the skin and subcutaneous tissues until the common femoral artery was identified. This was dissected circumferentially for approximately 2 inches and doubly encircled with vessel loops proximally and distally. Approximately, a 2.5-inch longitudinal incision was made on the anterior compartment in the upper one-third of the left lower leg through the skin and subcutaneous tissues. The fascia was incised. We then  the tibialis muscles and then dissected between these until we encountered the neurovascular bundle. The anterior tibial artery was identified that was approximately 2.5 mm in diameter. It was supple and had a Doppler signal.  This was dissected free circumferentially for a distance of approximately 2.5-3 cm and doubly encircled with vessel loops proximally and distally. At this point, the CryoLife preserved saphenous vein was taut appropriately and then irrigated with appropriate solutions and prepped for bypass. The patient was then given 8,000 units of heparin and additional 2000 units of heparin to increase an ACT to near 250. The blue end of the prepared saphenous vein was then cut and spatulated to approximately 1.5 cm. Vessels were tightened on the common femoral artery proximally and distally, 1.5 cm arteriotomy was made. We then anastomosed the vein with tibial artery with a 6-0 Prolene suture in a running fashion. We flushed the common femoral artery proximally and distally prior to completion, irrigated with heparinized saline, and then completed the venous anastomosis. Clamps, vessel loops, Bulldogs were removed. We had good hemostasis. There were two small side branches that required 7-0 Prolene single suture for minimal bleeding from the branch side of a preserved vein. The vein was then marked. After pressurizing this, there was good outflow through the distal end.     The Harini-Wick tunneler was then passed from the anterior compartment incision and above the fascia lateral to the knee and up to the groin incision. We then sutured the vein to the tunneler and brought out through the tunneler without twisting the vein. The vessel loops were tightened distally on the anterior tibial artery and a Bulldog was placed proximally. Proximally, a 1 cm arteriotomy was made. We then anastomosed the vein to the artery with a 7-0 Prolene suture in a running fashion. Prior to completing this, we flushed the vein graft as well as the artery proximally and distally. We then completed the anastomosis. Bulldog vessel loops were removed. We did have a small area of bleeding that required single 7-0 Prolene suture. After completing hemostasis, there was a biphasic Doppler signal initially and a strong signal at the ankle and in recovery, there was a palpable dorsalis pedis pulse. At this point, the wounds were irrigated, thrombin and Gelfoam was placed and after several minutes the fascia on the lower leg incision was partially closed and subcutaneous tissues closed with a 3-0 Vicryl suture. The skin was then closed with 4-0 Monocryl in a subcuticular layer. At the groin site, the fascia was closed down to the bypass with a 2-0 Vicryl suture. The subcutaneous tissue closed with 3 layers of running 3-0 Vicryl sutures taking care not to compress the vein graft. The skin was closed with 4-0 Monocryl in a subcuticular layer. Dermabond skin adhesive was applied and after drying it, a thin strip of Telfa with a Tegaderm dressing. The patient was extubated in the operating room, was taken to the recovery room in stable condition without complications.       Marilynn Peacock MD      [id number]  D:  11/05/2019 0:42  T:  11/05/2019 7:18  JOB #:  [id number]  CC:  [address] [place] [address] [facility]

## 2019-11-05 NOTE — PROGRESS NOTES
Problem: Discharge Planning  Goal: *Discharge to safe environment  Outcome: Progressing Towards Goal  Plan is to be determined , home vs home with home health    Reason for Admission:  PVD                   RRAT Score:    18              Do you (patient/family) have any concerns for transition/discharge? The 12 to 14 steps to get upstairs. Plan for utilizing home health: Therapy recommends Home Health. I took [facility] into room, but patient had visitors. [address] given to pt to look at to choose tomorrow and blank forms on front of chart. FYI placed to ask for  Order for [facility] and walker. Current Advanced Directive/Advance Care Plan:  Yes, on file            Transition of Care Plan:  Patient verified demographics. SHe lives with her 13 yr old son at home and  Is independent with ADL. She uses no DME. She has a walker at home she states. She is concerned about 12-14 steps she has to go up at home. Her plan is home. Patient has designated ___mom_MPOA #1____________________ to participate in his/her discharge plan and to receive any needed information. Name: La Cadet  Address:  Phone number:  [phone]    Has MPOA#2 Padmini Aldana [phone]    Care Management Interventions  PCP Verified by CM: Yes(seen in august before or after heart stents)  Palliative Care Criteria Met (RRAT>21 & CHF Dx)?: No  Mode of Transport at Discharge: Other (see comment)(mother)  Transition of Care Consult (CM Consult): Discharge Planning  MyChart Signup: No  Discharge Durable Medical Equipment: No  Physical Therapy Consult: No  Occupational Therapy Consult: No  Speech Therapy Consult: No  Current Support Network: Other(lives with 13 yr old grandson)  Confirm Follow Up Transport: Family  Plan discussed with Pt/Family/Caregiver: Yes     Fareed Pederson.  Romayne Jews, BSN  [facility]  [phone], Pager [phone]  [email or web address]

## 2019-11-05 NOTE — PROGRESS NOTES
Problem: Mobility Impaired (Adult and Pediatric)  Goal: *Acute Goals and Plan of Care (Insert Text)  Description  Physical Therapy Goals  Initiated 11/5/2019 and to be accomplished within 7 day(s)  1. Patient will move from supine to sit and sit to supine  in bed with modified independence. 2.  Patient will transfer from bed to chair and chair to bed with modified independence using the least restrictive device. 3.  Patient will perform sit to stand with modified independence. 4.  Patient will ambulate with modified independence for 150 feet with the least restrictive device. 5.  Patient will ascend/descend 10 stairs with handrail(s) with modified independence. Outcome: Progressing Towards Goal   PHYSICAL THERAPY EVALUATION    Patient: Deshawn Sheppard (31 y.o. female)  Date: 11/5/2019  Primary Diagnosis: PVD (peripheral vascular disease) ([facility] [I73.9]  Procedure(s) (LRB):  left common femoral artery to upper anterior tibial artery bypass with cryovein (Left) 1 Day Post-Op   Precautions: Fall  PLOF: Independent  ASSESSMENT :  Encountered patient exiting bed with RN Magnolia Begum. Supervision for sit to stand. Amb 250ft with no AD and supervision. Decreased step length and gait speed. Requires 15 minutes to amb 250ft. Educated on safety with mobility and use of AD for pain modification and balance; patient adamantly refuses walker. Expresses desire to trial stairs prior to discharge home; declines this session. Completed sit to stand transfers from commode with mod I. Returned to supine in bed with mod I. Education provided on bed mobility, transfers, ADLs, balance, amb, safety, exercise, role of PT, plan of care, cognition, skin integrity, vitals as indicated. Educated on need for RN assistance with mobility; verbalized understanding. Call bell in reach. Patient will benefit from skilled intervention to address the above impairments.   Patient's rehabilitation potential is considered to be Good  Factors which may influence rehabilitation potential include:   ? None noted  ? Mental ability/status  ? Medical condition  ? Home/family situation and support systems  ? Safety awareness  ? Pain tolerance/management  ? Other:      PLAN :  Recommendations and Planned Interventions:   ?           Bed Mobility Training             ? Neuromuscular Re-Education  ? Transfer Training                   ? Orthotic/Prosthetic Training  ? Gait Training                          ? Modalities  ? Therapeutic Exercises           ? Edema Management/Control  ? Therapeutic Activities            ? Family Training/Education  ? Patient Education  ? Other (comment):    Frequency/Duration: Patient will be followed by physical therapy 1-2 time a week to address goals. Discharge Recommendations: Home Health  Further Equipment Recommendations for Discharge: straight cane and rolling walker; has     SUBJECTIVE:   Patient stated I don't want to become dependent.     OBJECTIVE DATA SUMMARY:     Past Medical History:   Diagnosis Date    Arthritis     Asthma     CAD (coronary artery disease)     S/P LAD 2.25 x 38 mm and 2.5 x 12 mm SYNERGY (), OM 2.25 X 18 mm BMS ()    Cardiomyopathy ([facility]     Depression     Diabetes ([facility]     Hypertension     PAD (peripheral artery disease) ([facility]     S/P LE angioplasty and stent, LLE bypass    Tobacco abuse     Unspecified sleep apnea      Past Surgical History:   Procedure Laterality Date    HX  SECTION      HX HYSTERECTOMY      HX ORTHOPAEDIC      \"plate in R arm\"    HX SHOULDER ARTHROSCOPY Left      Barriers to Learning/Limitations: yes;  other safety   Compensate with: Visual Cues, Verbal Cues, Tactile Cues and Kinesthetic Cues    Home Situation:  Home Situation  Home Environment: Apartment  # Steps to Enter: 1  One/Two Story Residence: Two story  # of Interior Steps: 14  Height of Each Step (in): 6 inches  Interior Rails: Right  Lift Chair Available: No  Living Alone: No  Support Systems: Parent  Patient Expects to be Discharged to[de-identified] Apartment  Current DME Used/Available at Home: Cane, straight    Critical Behavior:  Neurologic State: Alert  Orientation Level: Oriented X4  Cognition: Follows commands     Psychosocial  Purposeful Interaction: Yes    Strength:    Manual Muscle Testing (LE)         R     L    Hip Flexion:   3+/5  3+/5  Knee EXT:   3+/5  3+/5  Knee FLEX:   3+/5  3+/5  Ankle DF:   3+/5  3+/5  _________________________________________________   Range Of Motion:  BLE AROM WFL  Functional Mobility:  Transfers:  Sit to Stand: Supervision  Stand to Sit: Supervision  Balance:   Sitting: Intact  Standing: Impaired  Standing - Static: Good  Standing - Dynamic : Fair  Ambulation/Gait Training:  Distance (ft): 250 Feet (ft)  Ambulation - Level of Assistance: Supervision  Pain:  Pain level pre-treatment: 3/10   Pain level post-treatment: 3/10   Pain Scale 1: Numeric (0 - 10)    Activity Tolerance:   Fair    After treatment:   ?         Patient left in no apparent distress sitting up in chair  ? Patient left in no apparent distress in bed  ? Call bell left within reach  ? Nursing notified  ? Caregiver present  ? Bed alarm activated  ? SCDs applied    COMMUNICATION/EDUCATION:   ?         Role of physical therapy and plan of care in the acute care setting. ?         Fall prevention education was provided and the patient/caregiver indicated understanding. ? Patient/family have participated as able in goal setting and plan of care. ?         Patient/family agree to work toward stated goals and plan of care. ?         Patient understands intent and goals of therapy, but is neutral about his/her participation. ? Patient is unable to participate in goal setting/plan of care: ongoing with therapy staff.     Thank you for this referral.  Elli Pitts, PT   Time Calculation: 24 mins    Eval Complexity: History: MEDIUM  Complexity : 1-2 comorbidities / personal factors will impact the outcome/ POC Exam:MEDIUM Complexity : 3 Standardized tests and measures addressing body structure, function, activity limitation and / or participation in recreation  Presentation: MEDIUM Complexity : Evolving with changing characteristics  Clinical Decision Making:Medium Complexity clinical judgement; ROM, MMT, functional mobility  Overall Complexity:MEDIUM

## 2019-11-05 NOTE — PROGRESS NOTES
PT orders received and chart reviewed. Remains with active bedrest orders as well as OOB in chair orders. Will need clarification including discontinuing of bedrest orders prior to mobility.      Thank you,     Messi Mariee, PT, DPT  Office Extension: [phone]

## 2019-11-05 NOTE — ROUTINE PROCESS
Pt. Received FLU vaccine in [address] [place] [address] in June of this year. Immunization record updated.

## 2019-11-05 NOTE — PROGRESS NOTES
Progress Note      Patient: Karyna Nuñez               Sex: female          DOA: 11/4/2019       YOB: 1965      Age:  48 y.o.        LOS:  LOS: 1 day             CHIEF COMPLAINT:  PAD, s/p vascular bypass    Subjective:     Patient sitting up at bedside. Her spirits are good  No chest pain or SOB    Objective:      Visit Vitals  /64   Pulse 65   Temp 98.7 °F (37.1 °C)   Resp 16   Ht 5' 5\" (1.651 m)   Wt 80.6 kg (177 lb 11.1 oz)   SpO2 100%   Breastfeeding? No   BMI 29.57 kg/m²       Physical Exam:  Gen:  Patient is in no distress. No complaint  HEENT and Neck:  PERRL, No cervical tenderness or masses  Lungs:  Clear bilaterally. No rales, no wheeze. Normal effort. Heart:  Regular Rate and Rhythm. No murmur or gallop. No JVD. No edema.   Abdomen:  Soft, non tender, no masses, no Hepatosplenomegally  Extremities:  No digital clubbing, no cyanosis  Neuro:  Alert and oriented, Normal affect, Cranial nerves intact, No sensory deficits        Lab/Data Reviewed:  BMP:   Lab Results   Component Value Date/Time     11/05/2019 03:30 AM    K 3.5 11/05/2019 03:30 AM     11/05/2019 03:30 AM    CO2 28 11/05/2019 03:30 AM    AGAP 6 11/05/2019 03:30 AM    GLU 94 11/05/2019 03:30 AM    BUN 12 11/05/2019 03:30 AM    CREA 0.70 11/05/2019 03:30 AM    GFRAA >60 11/05/2019 03:30 AM    GFRNA >60 11/05/2019 03:30 AM     CBC:   Lab Results   Component Value Date/Time    WBC 4.6 11/05/2019 03:30 AM    HGB 9.1 (L) 11/05/2019 03:30 AM    HCT 30.6 (L) 11/05/2019 03:30 AM     11/05/2019 03:30 AM           Assessment/Plan     Principal Problem:    PVD (peripheral vascular disease) ([facility] (11/4/2019)    Active Problems:    DM (diabetes mellitus) ([facility] (12/20/2018)      HTN (hypertension) (12/20/2018)      CAD (coronary artery disease) (12/20/2018)      Atherosclerotic PVD with intermittent claudication ([facility] (12/20/2018)        Plan:  Continue with BP control  Mobilization as possible  Follow h/h and renal function  BS control is good. Out of ICU when okay with Vascular Surgery.

## 2019-11-05 NOTE — PROGRESS NOTES
Problem: Discharge Planning  Goal: *Discharge to safe environment  Outcome: Progressing Towards Goal  Plan is to be determined , home vs home with home health

## 2019-11-05 NOTE — PROGRESS NOTES
[facility]  [address], [address] [facility]   Dr. Heladio Stovall, Dr. Socorro Stovall, Dr. Shaw Paiz  [phone] FAX# [phone]    Progress Note    Patient: Luisa Mix MRN: [id number]  SSN: [id number]    YOB: 1965  Age: 48 y.o. Sex: female      Admit Date: 11/4/2019    LOS: 1 day     Subjective:     Pt reports feeling well. LLE feeling better than pre op other than incisional pain which is well controlled. Objective:     Vitals:    11/05/19 0600 11/05/19 0700 11/05/19 0800 11/05/19 0818   BP: 115/52 120/73 117/59 120/64   Pulse: (!) 58 68 75 65   Resp: 15 13 16    Temp:   98.7 °F (37.1 °C)    SpO2: 100% 100% 99%    Weight:       Height:            Intake and Output:  Current Shift: No intake/output data recorded. Last three shifts: 11/03 1901 - 11/05 0700  In: 3161.3 [P.O.:120; I.V.:3041.3]  Out: 2525 [Urine:2425]    Physical Exam:   Gen - AnO, comfortable, pleasant, conversant, lucid  Ext - trace LLE edema, motor/sensory intact, foot warm  Wounds - dressings dry  Pulses - palp graft pulse at left lateral knee, palp DP pulse    Lab/Data Review:  BMP:   Lab Results   Component Value Date/Time     11/05/2019 03:30 AM    K 3.5 11/05/2019 03:30 AM     11/05/2019 03:30 AM    CO2 28 11/05/2019 03:30 AM    AGAP 6 11/05/2019 03:30 AM    GLU 94 11/05/2019 03:30 AM    BUN 12 11/05/2019 03:30 AM    CREA 0.70 11/05/2019 03:30 AM    GFRAA >60 11/05/2019 03:30 AM    GFRNA >60 11/05/2019 03:30 AM     CBC:   Lab Results   Component Value Date/Time    WBC 4.6 11/05/2019 03:30 AM    HGB 9.1 (L) 11/05/2019 03:30 AM    HCT 30.6 (L) 11/05/2019 03:30 AM     11/05/2019 03:30 AM            Assessment:     Active Problems:    PVD (peripheral vascular disease) ([facility] (11/4/2019)    s/p LLE fem-IVETH bypass with cryovein. Doing well. Ok fot floor and to start ambulating with assistance.     Plan:     Transfer to floor  Continue antiplatelet therapy  Up with assistance    Signed By: Eric Valadez MD     November 5, 2019

## 2019-11-05 NOTE — PROGRESS NOTES
1915 Bedside shift change report given to  RuAlessia Blum (oncoming nurse) by Poncho Muniz (offgoing nurse). Report included the following information SBAR, Kardex, Procedure Summary, Intake/Output, MAR, Accordion, Recent Results, Med Rec Status, Alarm Parameters  and Quality Measures. 2000 Assessment. Client states she only takes Wellbutrin 100mg once a day at home and requests to skip the evening dose and start in the AM.     Bedside shift change report given to Amisha Gutierrez RN (oncoming nurse) by [facility] Blum (offgoing nurse). Report included the following information SBAR, Kardex, Procedure Summary, Intake/Output, MAR, Accordion, Recent Results, Med Rec Status, Quality Measures and Dual Neuro Assessment.

## 2019-11-05 NOTE — PROGRESS NOTES
0700 Bedside and Verbal shift change report given to [address] [place] [address] [address] (oncoming nurse) by Benjamin Reyes (offgoing nurse). Report included the following information SBAR, Kardex, Intake/Output, MAR and Recent Results. Pt shows no signs of distress at this time. 0800 Ramos removed, pt tolerated well. A line removed pt tolerated well.    0900 Pt up in recliner for breakfast. Tolerating well. 1000 nasal cannula removed pt on room air SpO2 100%. 1330 Pt returned to bed. Tolerated move well. 1400 Pt ambulating with PT, walked around the unit, tolerated well. 1850 pt experiencing muscle spasms. Pain medication administered. 1830 TRANSFER - OUT REPORT:    Verbal report given to Gloria Har[facility] (name[facility]  [facility] to (unit) for routine progression of care       Report consisted of patients Situation, Background, Assessment and   Recommendations(SBAR). Information from the following report(s) SBAR, Kardex, Intake/Output, MAR and Recent Results was reviewed with the receiving nurse. Lines:   Peripheral IV 11/04/19 Left Arm (Active)   Site Assessment Clean, dry, & intact 11/5/2019  6:00 PM   Phlebitis Assessment 0 11/5/2019  6:00 PM   Infiltration Assessment 0 11/5/2019  6:00 PM   Dressing Status Clean, dry, & intact 11/5/2019  6:00 PM   Dressing Type Transparent;Tape 11/5/2019 12:00 PM   Hub Color/Line Status Patent; Flushed 11/5/2019 12:00 PM   Action Taken Open ports on tubing capped 11/5/2019 12:00 PM   Alcohol Cap Used Yes 11/5/2019 12:00 PM       Peripheral IV 11/04/19 Right Hand (Active)   Site Assessment Clean, dry, & intact 11/5/2019  6:00 PM   Phlebitis Assessment 0 11/5/2019  6:00 PM   Infiltration Assessment 0 11/5/2019  6:00 PM   Dressing Status Clean, dry, & intact 11/5/2019  6:00 PM   Dressing Type Tape;Transparent 11/5/2019 12:00 PM   Hub Color/Line Status Patent; Infusing 11/5/2019 12:00 PM   Action Taken Open ports on tubing capped 11/5/2019 12:00 PM   Alcohol Cap Used Yes 11/5/2019 12:00 PM        Opportunity for questions and clarification was provided.       Patient transported with:   Monitor  Registered Nurse

## 2019-11-05 NOTE — PROGRESS NOTES
1930 Bedside and Verbal shift change report given to [address] [place] [address] (oncoming nurse) by Elda Jean RN   (offgoing nurse).  Report included the following information SBAR, Kardex, Recent Results and Cardiac Rhythm SR.

## 2019-11-05 NOTE — PROGRESS NOTES
Problem: Suicide  Goal: *STG: Remains safe in hospital  Outcome: Progressing Towards Goal  Goal: *STG: Seeks staff when feelings of self harm or harm towards others arise  Outcome: Progressing Towards Goal  Goal: *STG: Attends activities and groups  Outcome: Progressing Towards Goal  Goal: *STG:  Verbalizes alternative ways of dealing with maladaptive feelings/behaviors  Outcome: Progressing Towards Goal  Goal: *STG/LTG: Complies with medication therapy  Outcome: Progressing Towards Goal  Goal: *STG/LTG: No longer expresses self destructive or suicidal thoughts  Outcome: Progressing Towards Goal  Goal: *LTG:  Identifies available community resources  Outcome: Progressing Towards Goal  Goal: *LTG:  Develops proactive suicide prevention plan  Outcome: Progressing Towards Goal  Goal: Interventions  Outcome: Progressing Towards Goal     Problem: Patient Education: Go to Patient Education Activity  Goal: Patient/Family Education  Outcome: Progressing Towards Goal     Problem: Falls - Risk of  Goal: *Absence of Falls  Description  Document Rojelio Dumont Fall Risk and appropriate interventions in the flowsheet.   Outcome: Progressing Towards Goal  Note:   Fall Risk Interventions:  Mobility Interventions: Bed/chair exit alarm, Strengthening exercises (ROM-active/passive)    Mentation Interventions: Door open when patient unattended, More frequent rounding, Toileting rounds    Medication Interventions: Bed/chair exit alarm, Patient to call before getting OOB    Elimination Interventions: Bed/chair exit alarm, Call light in reach, Patient to call for help with toileting needs, Toileting schedule/hourly rounds    History of Falls Interventions: Door open when patient unattended         Problem: Patient Education: Go to Patient Education Activity  Goal: Patient/Family Education  Outcome: Progressing Towards Goal     Problem: Pain  Goal: *Control of Pain  Outcome: Progressing Towards Goal  Goal: *PALLIATIVE CARE:  Alleviation of Spoke with Dr. Daya Simpson to ensure tylenol prn orders were most appropriate pain medication orders given patient's liver issues. Dr. Stapleton Slider to evaluate patient. No new orders received at this time. Pain  Outcome: Progressing Towards Goal     Problem: Patient Education: Go to Patient Education Activity  Goal: Patient/Family Education  Outcome: Progressing Towards Goal     Problem: Pain  Goal: *Control of Pain  Outcome: Progressing Towards Goal     Problem: Infection - Risk of, Surgical Site Infection  Goal: *Absence of surgical site infection signs and symptoms  Outcome: Progressing Towards Goal     Problem: LE Bypass: Post-Op Day 1  Goal: Off Pathway (Use only if patient is Off Pathway)  Outcome: Progressing Towards Goal  Goal: Activity/Safety  Outcome: Progressing Towards Goal  Goal: Diagnostic Test/Procedures  Outcome: Progressing Towards Goal  Goal: Nutrition/Diet  Outcome: Progressing Towards Goal  Goal: Discharge Planning  Outcome: Progressing Towards Goal  Goal: Medications  Outcome: Progressing Towards Goal  Goal: Respiratory  Outcome: Progressing Towards Goal  Goal: Treatments/Interventions/Procedures  Outcome: Progressing Towards Goal  Goal: Psychosocial  Outcome: Progressing Towards Goal  Goal: *Lungs clear or at baseline  Outcome: Progressing Towards Goal  Goal: *Hemodynamically stable  Outcome: Progressing Towards Goal  Goal: *Optimal pain control at patient's stated goal  Outcome: Progressing Towards Goal  Goal: *Tolerating diet  Outcome: Progressing Towards Goal  Goal: *Demonstrates progressive activity  Outcome: Progressing Towards Goal  Goal: *Adequate urinary output (equal to or greater than 30 milliliters/hour)  Outcome: Progressing Towards Goal

## 2019-11-06 ENCOUNTER — HOME HEALTH ADMISSION (OUTPATIENT)
Dept: HOME HEALTH SERVICES | Facility: HOME HEALTH | Age: 54
End: 2019-11-06
Payer: MEDICARE

## 2019-11-06 VITALS
BODY MASS INDEX: 29.61 KG/M2 | RESPIRATION RATE: 17 BRPM | OXYGEN SATURATION: 96 % | HEART RATE: 82 BPM | TEMPERATURE: 98.7 F | HEIGHT: 65 IN | DIASTOLIC BLOOD PRESSURE: 66 MMHG | SYSTOLIC BLOOD PRESSURE: 104 MMHG | WEIGHT: 177.69 LBS

## 2019-11-06 LAB
GLUCOSE BLD STRIP.AUTO-MCNC: 104 MG/DL (ref 70–110)
GLUCOSE BLD STRIP.AUTO-MCNC: 108 MG/DL (ref 70–110)

## 2019-11-06 PROCEDURE — 74011250637 HC RX REV CODE- 250/637: Performed by: SURGERY

## 2019-11-06 PROCEDURE — 82962 GLUCOSE BLOOD TEST: CPT

## 2019-11-06 PROCEDURE — 97116 GAIT TRAINING THERAPY: CPT

## 2019-11-06 RX ORDER — NALOXONE HYDROCHLORIDE 4 MG/.1ML
SPRAY NASAL
Qty: 1 EACH | Refills: 0 | Status: SHIPPED | OUTPATIENT
Start: 2019-11-06 | End: 2022-03-11

## 2019-11-06 RX ORDER — HYDROCODONE BITARTRATE AND ACETAMINOPHEN 5; 325 MG/1; MG/1
1 TABLET ORAL
Qty: 10 TAB | Refills: 0 | Status: SHIPPED | OUTPATIENT
Start: 2019-11-06 | End: 2019-11-09

## 2019-11-06 RX ADMIN — ATORVASTATIN CALCIUM 80 MG: 40 TABLET, FILM COATED ORAL at 09:10

## 2019-11-06 RX ADMIN — TICAGRELOR 90 MG: 90 TABLET ORAL at 02:44

## 2019-11-06 RX ADMIN — ASPIRIN 81 MG 81 MG: 81 TABLET ORAL at 09:05

## 2019-11-06 RX ADMIN — GABAPENTIN 300 MG: 300 CAPSULE ORAL at 09:10

## 2019-11-06 RX ADMIN — Medication 10 ML: at 06:25

## 2019-11-06 RX ADMIN — BUPROPION HYDROCHLORIDE 100 MG: 100 TABLET, FILM COATED, EXTENDED RELEASE ORAL at 09:05

## 2019-11-06 RX ADMIN — CARVEDILOL 6.25 MG: 6.25 TABLET, FILM COATED ORAL at 09:05

## 2019-11-06 RX ADMIN — AMLODIPINE BESYLATE 2.5 MG: 2.5 TABLET ORAL at 09:10

## 2019-11-06 RX ADMIN — TICAGRELOR 90 MG: 90 TABLET ORAL at 09:05

## 2019-11-06 RX ADMIN — LOSARTAN POTASSIUM 50 MG: 50 TABLET, FILM COATED ORAL at 09:05

## 2019-11-06 RX ADMIN — CLONAZEPAM 1 MG: 0.5 TABLET ORAL at 09:05

## 2019-11-06 RX ADMIN — ISOSORBIDE MONONITRATE 60 MG: 60 TABLET, EXTENDED RELEASE ORAL at 09:10

## 2019-11-06 NOTE — PROGRESS NOTES
Problem: Mobility Impaired (Adult and Pediatric)  Goal: *Acute Goals and Plan of Care (Insert Text)  Description  Physical Therapy Goals  Initiated 11/5/2019 and to be accomplished within 7 day(s)  1. Patient will move from supine to sit and sit to supine  in bed with modified independence. 2.  Patient will transfer from bed to chair and chair to bed with modified independence using the least restrictive device. 3.  Patient will perform sit to stand with modified independence. 4.  Patient will ambulate with modified independence for 150 feet with the least restrictive device. 5.  Patient will ascend/descend 10 stairs with handrail(s) with modified independence. Outcome: Resolved/Met   PHYSICAL THERAPY TREATMENT AND DISCHARGE    Patient: Phyllis Delacruz (40 y.o. female)  Date: 11/6/2019  Diagnosis: PVD (peripheral vascular disease) ([facility] [I73.9] PVD (peripheral vascular disease) ([facility]  Procedure(s) (LRB):  left common femoral artery to upper anterior tibial artery bypass with cryovein (Left) 2 Days Post-Op  Precautions: Fall  PLOF: Mod I    ASSESSMENT:  Pt progressing well today, slow but mod I with gait. Educated pt on using RW at home for safety; pt agreeable. Pt mod I on stairs, using appropriate attention to safety and sequencing. PLAN:  Patient will be discharged from physical therapy at this time. Rationale for discharge:  ?     Goals Achieved  ? Plateau Reached  ? Patient not participating in therapy  ? Other:  Discharge Recommendations:  home health  Further Equipment Recommendations for Discharge:  rolling walker     SUBJECTIVE:   Patient stated This is how I do the stairs at home.     OBJECTIVE DATA SUMMARY:   Critical Behavior:  Neurologic State: Alert, Appropriate for age  Orientation Level: Oriented X4  Cognition: Follows commands, Appropriate decision making     Functional Mobility Training:    Transfers:  Sit to Stand: Modified independent  Stand to Sit: Modified independent    Balance:  Sitting: Intact  Standing: Impaired  Standing - Static: Good  Standing - Dynamic : Good;Constant support   Ambulation/Gait Training:  Distance (ft): 200 Feet (ft)  Assistive Device: Walker, rolling  Ambulation - Level of Assistance: Modified independent    Base of Support: Center of gravity altered    Stairs:  Number of Stairs Trained: 5  Stairs - Level of Assistance: Supervision  Rail Use: Right     Pain:  Pain level pre-treatment: 0/10   Pain level post-treatment: 8/10   Pain Intervention(s): n/a      Activity Tolerance:   Good   Please refer to the flowsheet for vital signs taken during this treatment. After treatment:   ? Patient left in no apparent distress sitting up in chair  ? Patient left in no apparent distress in bed  ? Call bell left within reach  ? Nursing notified  ? Caregiver present  ? Bed alarm activated  ? SCDs applied      COMMUNICATION/EDUCATION:   ?           ? Fall prevention education was provided and the patient/caregiver indicated understanding. ? Patient/family have participated as able and agree with findings and recommendations. ?         Patient is unable to participate in plan of care at this time. ? Role of Physical Therapy in the acute care setting.         Jefferson Velasquez, PTA   Time Calculation: 25 mins

## 2019-11-06 NOTE — ROUTINE PROCESS
1910 Verbal shift change from [facility] (outgoing nurse), to JESUS Kwan (oncoming) Report included the following information SBAR, Kardex, Procedure Summary, Intake/Output, MAR, Recent Lab Results. Will resume care and monitor Pt. Condition. 1940 Received from ICU Pt. Is AOX 4. IV SL, Pt. denies  pain at this time. Pt. Educated on call bell when in need of help and assistance. Pt. verbalized understanding. Pt. Head to toe Assessment Done and documented. 2100 Pt. Resting comfortably in bed, no sign of distress. 2200  Pt. Made no complaints. 2300  Pt. Not in distress. 0030  OOB to bathroom x1 assist.    0230  Pt. Made no complaints. 0430  Pt denies discomfort. 0630  Pt. Able to rest and sleep well throughout the shift. Verbal and bedside Shift changed report given to Kali Foster RN (oncoming RN) on Pt. Condition. Report consisted of patients Situation, History, Activities, intake/output,  Background, Assessment and Recommendations(SBAR). Information from the following report(s) Kardex, order Summary, Lab results and MAR was reviewed with the receiving nurse. Opportunity for questions and clarification was provided.

## 2019-11-06 NOTE — DISCHARGE SUMMARY
Physician Discharge Summary     Patient ID:  Angel Diehl  [id number]  68 y.o.  1965    Admit date: 11/4/2019    Discharge date: 11/6/2019      Admitting Physician: Shiloh Montelongo MD     Discharge Physician: Robinson Hardy MD    Admission Diagnoses: PVD (peripheral vascular disease) [facility]) [I73.9]    Discharge Diagnoses: There are no discharge diagnoses documented for the most recent discharge. Procedures for this admission: Procedure(s):  left common femoral artery to upper anterior tibial artery bypass with cryovein    Discharged Condition: stable    Hospital Course: unremarkable. Consults: hospitalist        Vital Signs:   Visit Vitals  /84 (BP 1 Location: Left arm, BP Patient Position: At rest;Supine)   Pulse 78   Temp 99.2 °F (37.3 °C)   Resp 16   Ht 5' 5\" (1.651 m)   Wt 80.6 kg (177 lb 11.1 oz)   SpO2 94%   Breastfeeding? No   BMI 29.57 kg/m²       Discharge Exam: unremarkable. Bypass patent. Disposition: to home with home care services. Patient Instructions:   Current Discharge Medication List      START taking these medications    Details   HYDROcodone-acetaminophen (NORCO) 5-325 mg per tablet Take 1 Tab by mouth every six (6) hours as needed for Pain (as needed for severe pain) for up to 3 days. Max Daily Amount: 4 Tabs. Qty: 10 Tab, Refills: 0    Associated Diagnoses: Pain at surgical incision      naloxone (NARCAN) 4 mg/actuation nasal spray Use 1 spray intranasally, then discard. Repeat with new spray every 2 min as needed for opioid overdose symptoms, alternating nostrils. Qty: 1 Each, Refills: 0         CONTINUE these medications which have NOT CHANGED    Details   carvedilol (COREG) 6.25 mg tablet Take 1 Tab by mouth two (2) times daily (with meals). Qty: 60 Tab, Refills: 6      buPROPion SR (WELLBUTRIN SR) 150 mg SR tablet Take  by mouth two (2) times a day.       aspirin 81 mg chewable tablet 81 mg.      clonazePAM (KLONOPIN) 1 mg tablet Take 1 mg by mouth two (2) times a day.      amLODIPine (NORVASC) 2.5 mg tablet 2.5 mg.      albuterol (PROVENTIL HFA, VENTOLIN HFA) 90 mcg/actuation inhaler Take 2 Puffs by inhalation every four (4) hours as needed for Shortness of Breath or Wheezing. ticagrelor (BRILINTA) 90 mg tablet Take 1 Tab by mouth two (2) times a day. Qty: 60 Tab, Refills: 11      nitroglycerin (NITROSTAT) 0.4 mg SL tablet 1 Tab by SubLINGual route every five (5) minutes as needed for Chest Pain. Qty: 25 Tab, Refills: 3      losartan (COZAAR) 50 mg tablet Take 1 Tab by mouth daily. Qty: 30 Tab, Refills: 6      isosorbide mononitrate ER (IMDUR) 60 mg CR tablet Take 1 Tab by mouth daily. Qty: 30 Tab, Refills: 6      atorvastatin (LIPITOR) 80 mg tablet Take 80 mg by mouth daily. QUEtiapine (SEROQUEL) 50 mg tablet Take 50 mg by mouth daily. gabapentin (NEURONTIN) 300 mg capsule Take 300 mg by mouth three (3) times daily. linagliptin (TRADJENTA) 5 mg PO tablet 5 mg. STOP taking these medications       traMADol-acetaminophen (ULTRACET) 37.5-325 mg per tablet Comments:   Reason for Stopping:               Reference discharge instructions as provided by nursing for diet, labs, medications, activity, wound care and any outpatient referrals. Follow-up with Dr. Niki Day in the office in 2 weeks.     Javier Swift MD  November 6, 2019  11:52 AM

## 2019-11-06 NOTE — PROGRESS NOTES
Received ticket for walker went upstairs to getwalker, came off elevator pt being walked out by cna. Told her wait , I need to get paper for her to sign, walked down to unit cna came to unit said pt left would not wait. Brought walker back fax'd back delivery ticket to [employer]. ,

## 2019-11-06 NOTE — ROUTINE PROCESS
I have reviewed discharge instructions with the patient. The patient verbalized understanding.   Ride is at bedside

## 2019-11-06 NOTE — PROGRESS NOTES
[facility]  [address]. [address] [facility], [address] [facility]   Dr. Tracy Bonner, Dr. Pattie Camacho, & Dr. Morteza León  [phone] FAX# [phone]    Progress Note    Patient: Lala Sanders MRN: [id number]  SSN: [id number]    YOB: 1965  Age: 48 y.o. Sex: female      Admit Date: 11/4/2019    LOS: 2 days     Subjective:     No acute changes. Left leg feeling much better since surgery. She has worked with PT, recommends home. No significant postop pain. Objective:     Vitals:    11/05/19 1940 11/06/19 0112 11/06/19 0448 11/06/19 0731   BP: 110/73 127/81 152/81 134/84   Pulse: 75 74 80 78   Resp: 18 18 18 16   Temp: 98.8 °F (37.1 °C) 98.7 °F (37.1 °C) 99.8 °F (37.7 °C) 99.2 °F (37.3 °C)   SpO2: 95% 93% 94% 94%   Weight:       Height:            Intake and Output:  Current Shift: 11/06 0701 - 11/06 1900  In: 456 [P.O.:456]  Out: -   Last three shifts: 11/04 1901 - 11/06 0700  In: 1850 [I.V.:1850]  Out: 400 [Urine:400]    Physical Exam:   GEN: NAD, alert  LLE: groin and calf incisions clean, dry, intact. Mild ecchymosis at groin incision, no significant hematoma. Good pulse overlying bypass graft. Biphasic Doppler signal over bypass graft and at dorsalis pedis on foot. Compartments soft, motor/sensory function intact. Assessment:     Principal Problem:    PVD (peripheral vascular disease) ([facility] [address] (11/4/2019)    Active Problems:    Atherosclerotic PVD with intermittent claudication ([facility] [address] (12/20/2018)      CAD (coronary artery disease) (12/20/2018)      DM (diabetes mellitus) ([facility] [address] (12/20/2018)      HTN (hypertension) (12/20/2018)      POD#2 s/p left femoral-AT bypass, doing well, bypass patent. Plan:   Ambulate  Aspirin/Brillinta  Discharge to home with home care services  Follow up in office with Dr. Bk Blandon in 2 weeks.     Signed By: Morteza León MD     November 6, 2019

## 2019-11-06 NOTE — PROGRESS NOTES
Problem: Suicide  Goal: *STG: Remains safe in hospital  Outcome: Progressing Towards Goal  Goal: *STG: Seeks staff when feelings of self harm or harm towards others arise  Outcome: Progressing Towards Goal  Goal: *STG: Attends activities and groups  Outcome: Progressing Towards Goal  Goal: *STG:  Verbalizes alternative ways of dealing with maladaptive feelings/behaviors  Outcome: Progressing Towards Goal  Goal: *STG/LTG: Complies with medication therapy  Outcome: Progressing Towards Goal  Goal: *STG/LTG: No longer expresses self destructive or suicidal thoughts  Outcome: Progressing Towards Goal  Goal: *LTG:  Identifies available community resources  Outcome: Progressing Towards Goal  Goal: *LTG:  Develops proactive suicide prevention plan  Outcome: Progressing Towards Goal  Goal: Interventions  Outcome: Progressing Towards Goal     Problem: Patient Education: Go to Patient Education Activity  Goal: Patient/Family Education  Outcome: Progressing Towards Goal     Problem: Falls - Risk of  Goal: *Absence of Falls  Description  Document Pravin Amaya Fall Risk and appropriate interventions in the flowsheet.   Outcome: Progressing Towards Goal  Note:   Fall Risk Interventions:  Mobility Interventions: Bed/chair exit alarm, OT consult for ADLs, Patient to call before getting OOB    Mentation Interventions: Adequate sleep, hydration, pain control, Door open when patient unattended, Bed/chair exit alarm    Medication Interventions: Bed/chair exit alarm, Teach patient to arise slowly, Patient to call before getting OOB    Elimination Interventions: Bed/chair exit alarm, Call light in reach, Stay With Me (per policy)    History of Falls Interventions: Bed/chair exit alarm, Door open when patient unattended, Room close to nurse's station         Problem: Patient Education: Go to Patient Education Activity  Goal: Patient/Family Education  Outcome: Progressing Towards Goal     Problem: Pain  Goal: *Control of Pain  Outcome: Progressing Towards Goal  Goal: *PALLIATIVE CARE:  Alleviation of Pain  Outcome: Progressing Towards Goal     Problem: Patient Education: Go to Patient Education Activity  Goal: Patient/Family Education  Outcome: Progressing Towards Goal     Problem: Pain  Goal: *Control of Pain  Outcome: Progressing Towards Goal     Problem: Infection - Risk of, Surgical Site Infection  Goal: *Absence of surgical site infection signs and symptoms  Outcome: Progressing Towards Goal     Problem: LE Bypass: Post-Op Day 1  Goal: Off Pathway (Use only if patient is Off Pathway)  Outcome: Progressing Towards Goal  Goal: Activity/Safety  Outcome: Progressing Towards Goal  Goal: Consults, if ordered  Outcome: Progressing Towards Goal  Goal: Diagnostic Test/Procedures  Outcome: Progressing Towards Goal  Goal: Nutrition/Diet  Outcome: Progressing Towards Goal  Goal: Discharge Planning  Outcome: Progressing Towards Goal  Goal: Medications  Outcome: Progressing Towards Goal  Goal: Respiratory  Outcome: Progressing Towards Goal  Goal: Treatments/Interventions/Procedures  Outcome: Progressing Towards Goal  Goal: Psychosocial  Outcome: Progressing Towards Goal  Goal: *Lungs clear or at baseline  Outcome: Progressing Towards Goal  Goal: *Hemodynamically stable  Outcome: Progressing Towards Goal  Goal: *Optimal pain control at patient's stated goal  Outcome: Progressing Towards Goal  Goal: *Tolerating diet  Outcome: Progressing Towards Goal  Goal: *Demonstrates progressive activity  Outcome: Progressing Towards Goal  Goal: *Adequate urinary output (equal to or greater than 30 milliliters/hour)  Outcome: Progressing Towards Goal     Problem: Patient Education: Go to Patient Education Activity  Goal: Patient/Family Education  Outcome: Progressing Towards Goal     Problem: Discharge Planning  Goal: *Discharge to safe environment  Outcome: Progressing Towards Goal

## 2019-11-06 NOTE — DISCHARGE INSTRUCTIONS
*  Please give a list of your current medications to your Primary Care Provider. *  Please update this list whenever your medications are discontinued, doses are      changed, or new medications (including over-the-counter products) are added. *  Please carry medication information at all times in case of emergency situations. These are general instructions for a healthy lifestyle:    No smoking/ No tobacco products/ Avoid exposure to second hand smoke  Surgeon General's Warning:  Quitting smoking now greatly reduces serious risk to your health. Obesity, smoking, and sedentary lifestyle greatly increases your risk for illness    A healthy diet, regular physical exercise & weight monitoring are important for maintaining a healthy lifestyle    You may be retaining fluid if you have a history of heart failure or if you experience any of the following symptoms:  Weight gain of 3 pounds or more overnight or 5 pounds in a week, increased swelling in our hands or feet or shortness of breath while lying flat in bed. Please call your doctor as soon as you notice any of these symptoms; do not wait until your next office visit. The discharge information has been reviewed with the patient. The patient verbalized understanding. Discharge medications reviewed with the patient and appropriate educational materials and side effects teaching were provided. ___________________________________________________________________________________________________________________________________     Femoral-Tibial Bypass Surgery: What to Expect at Home  Your Recovery    Femoral-tibial bypass is surgery to bypass diseased blood vessels in the lower leg or foot. You will have some pain from the cuts (incisions) the doctor made. The pain usually gets better after about 1 week. Your doctor will give you pain medicine.  You can expect your leg to be swollen at first. This is a normal part of recovery and may last 2 or 3 months. You may need to stay in the hospital for 3 to 5 days. You will need to take it easy for 2 to 6 weeks at home. It may take 6 to 12 weeks to fully recover. You will need to have regular checkups with your doctor to make sure the graft is working. This care sheet gives you a general idea about how long it will take for you to recover. But each person recovers at a different pace. Follow the steps below to get better as quickly as possible. How can you care for yourself at home? Activity    · Rest when you feel tired. Getting enough sleep will help you recover.     · Try to walk each day or as often as your doctor tells you. Start by walking a little more than you did the day before. Bit by bit, increase the amount you walk. Walking boosts blood flow and helps prevent pneumonia and constipation.     · Avoid strenuous activities, such as bicycle riding, jogging, weight lifting, or aerobic exercise. Your doctor will tell you when it's okay to do strenuous activity.     · Ask your doctor when you can drive again.     · You will probably need to take 2 to 6 weeks off from work. It depends on the type of work you do and how you feel.     · You may shower, if your doctor okays it. Do not take a bath for the first 2 weeks, or until your doctor tells you it is okay. Diet    · You can eat your normal diet. If your stomach is upset, try bland, low-fat foods like plain rice, broiled chicken, toast, and yogurt.     · Drink plenty of fluids (unless your doctor tells you not to).     · You may notice that your bowel movements are not regular right after your surgery. This is common. You may want to take a fiber supplement every day. If you have not had a bowel movement after a couple of days, ask your doctor about taking a mild laxative. Medicines    · Your doctor will tell you if and when you can restart your medicines.  He or she will also give you instructions about taking any new medicines.     · If you take blood thinners, such as warfarin (Coumadin), clopidogrel (Plavix), or aspirin, be sure to talk to your doctor. He or she will tell you if and when to start taking those medicines again. Make sure that you understand exactly what your doctor wants you to do.     · Take your medicines exactly as prescribed. Call your doctor if you think you are having a problem with your medicine.     · Your doctor may prescribe a blood thinner, such as aspirin, when you go home. This helps prevent blood clots.     · Be safe with medicines. Take pain medicines exactly as directed. ? If the doctor gave you a prescription medicine for pain, take it as prescribed. ? If you are not taking a prescription pain medicine, ask your doctor if you can take an over-the-counter medicine.     · If you think your pain medicine is making you sick to your stomach:  ? Take your medicine after meals (unless your doctor has told you not to). ? Ask your doctor for a different pain medicine.     · If your doctor prescribed antibiotics, take them as directed. Do not stop taking them just because you feel better. You need to take the full course of antibiotics. Incision care    · If you have bandages on the incisions, follow your doctor's instructions about changing them.     · If you have strips of tape on the cut (incision) the doctor made, leave the tape on for a week or until it falls off.     · Wash the area daily with water and pat it dry. Don't use hydrogen peroxide or alcohol, which can slow healing. You may cover the area with a gauze bandage if it weeps or rubs against clothing. Change the bandage every day.    Elevation    · Prop up your leg on a pillow anytime you sit or lie down during the next 3 days. Try to keep it above the level of your heart. This will help reduce swelling. Follow-up care is a key part of your treatment and safety. Be sure to make and go to all appointments, and call your doctor if you are having problems.  It's also a good idea to know your test results and keep a list of the medicines you take. When should you call for help? Call 911 anytime you think you may need emergency care. For example, call if:    · You passed out (lost consciousness).     · You have trouble breathing.    Call your doctor now or seek immediate medical care if:    · You have severe pain in your leg, or it becomes cold, pale, blue, tingly, or numb.     · You have pain that does not get better after you take pain medicine.     · You have loose stitches, or your incisions come open.     · You are bleeding a lot from the incisions.     · You have signs of infection, such as:  ? Increased pain, swelling, warmth, or redness. ? Red streaks leading from the incision. ? Pus draining from the incision. ? A fever.     · You are sick to your stomach or cannot keep fluids down.    Watch closely for any changes in your health, and be sure to contact your doctor if:    · You do not get better as expected. Where can you learn more? Go to http://rolanda-leighann.info/. Enter Q880 in the search box to learn more about \"Femoral-Tibial Bypass Surgery: What to Expect at Home. \"  Current as of: April 9, 2019  Content Version: 12.2  © 7570-7403 [employer]. Care instructions adapted under license by [employer] (which disclaims liability or warranty for this information). If you have questions about a medical condition or this instruction, always ask your healthcare professional. [place] [address] any warranty or liability for your use of this information.

## 2019-11-08 ENCOUNTER — HOME CARE VISIT (OUTPATIENT)
Dept: SCHEDULING | Facility: HOME HEALTH | Age: 54
End: 2019-11-08
Payer: MEDICARE

## 2019-11-08 ENCOUNTER — HOME CARE VISIT (OUTPATIENT)
Dept: HOME HEALTH SERVICES | Facility: HOME HEALTH | Age: 54
End: 2019-11-08

## 2019-11-08 VITALS
HEART RATE: 85 BPM | OXYGEN SATURATION: 96 % | DIASTOLIC BLOOD PRESSURE: 76 MMHG | SYSTOLIC BLOOD PRESSURE: 133 MMHG | RESPIRATION RATE: 18 BRPM | TEMPERATURE: 97.6 F

## 2019-11-08 PROCEDURE — G0299 HHS/HOSPICE OF RN EA 15 MIN: HCPCS

## 2019-11-08 PROCEDURE — 3331090002 HH PPS REVENUE DEBIT

## 2019-11-08 PROCEDURE — 3331090001 HH PPS REVENUE CREDIT

## 2019-11-08 PROCEDURE — 400013 HH SOC

## 2019-11-09 PROCEDURE — 3331090001 HH PPS REVENUE CREDIT

## 2019-11-09 PROCEDURE — 3331090002 HH PPS REVENUE DEBIT

## 2019-11-10 PROCEDURE — 3331090002 HH PPS REVENUE DEBIT

## 2019-11-10 PROCEDURE — 3331090001 HH PPS REVENUE CREDIT

## 2019-11-11 ENCOUNTER — HOME CARE VISIT (OUTPATIENT)
Dept: HOME HEALTH SERVICES | Facility: HOME HEALTH | Age: 54
End: 2019-11-11
Payer: MEDICARE

## 2019-11-11 PROCEDURE — 3331090001 HH PPS REVENUE CREDIT

## 2019-11-11 PROCEDURE — 3331090002 HH PPS REVENUE DEBIT

## 2019-11-12 ENCOUNTER — HOME CARE VISIT (OUTPATIENT)
Dept: SCHEDULING | Facility: HOME HEALTH | Age: 54
End: 2019-11-12
Payer: MEDICARE

## 2019-11-12 ENCOUNTER — HOME CARE VISIT (OUTPATIENT)
Dept: HOME HEALTH SERVICES | Facility: HOME HEALTH | Age: 54
End: 2019-11-12
Payer: MEDICARE

## 2019-11-12 VITALS
DIASTOLIC BLOOD PRESSURE: 70 MMHG | HEART RATE: 79 BPM | TEMPERATURE: 97.7 F | SYSTOLIC BLOOD PRESSURE: 130 MMHG | OXYGEN SATURATION: 97 %

## 2019-11-12 VITALS
DIASTOLIC BLOOD PRESSURE: 93 MMHG | BODY MASS INDEX: 27.71 KG/M2 | HEART RATE: 84 BPM | WEIGHT: 166.5 LBS | OXYGEN SATURATION: 98 % | SYSTOLIC BLOOD PRESSURE: 174 MMHG

## 2019-11-12 PROCEDURE — G0151 HHCP-SERV OF PT,EA 15 MIN: HCPCS

## 2019-11-12 PROCEDURE — 3331090001 HH PPS REVENUE CREDIT

## 2019-11-12 PROCEDURE — 3331090002 HH PPS REVENUE DEBIT

## 2019-11-12 PROCEDURE — G0495 RN CARE TRAIN/EDU IN HH: HCPCS

## 2019-11-12 NOTE — CDMP QUERY
Clarification of Clinical Findings  Patient documented to have history of coronary artery disease. Cardiomyopathy  is listed as a diagnosis.  If possible, clarify  and document in the progress notes the type of cardiomyopathy as:    Ischemic  Nonischemic  Other specified cardiomyopathy   No further information can be provided

## 2019-11-13 ENCOUNTER — HOME CARE VISIT (OUTPATIENT)
Dept: SCHEDULING | Facility: HOME HEALTH | Age: 54
End: 2019-11-13
Payer: MEDICARE

## 2019-11-13 ENCOUNTER — HOME CARE VISIT (OUTPATIENT)
Dept: HOME HEALTH SERVICES | Facility: HOME HEALTH | Age: 54
End: 2019-11-13
Payer: MEDICARE

## 2019-11-13 VITALS
HEART RATE: 74 BPM | OXYGEN SATURATION: 98 % | SYSTOLIC BLOOD PRESSURE: 134 MMHG | TEMPERATURE: 45.3 F | RESPIRATION RATE: 18 BRPM | DIASTOLIC BLOOD PRESSURE: 80 MMHG

## 2019-11-13 PROCEDURE — G0299 HHS/HOSPICE OF RN EA 15 MIN: HCPCS

## 2019-11-13 PROCEDURE — A4452 WATERPROOF TAPE: HCPCS

## 2019-11-13 PROCEDURE — 3331090001 HH PPS REVENUE CREDIT

## 2019-11-13 PROCEDURE — A4216 STERILE WATER/SALINE, 10 ML: HCPCS

## 2019-11-13 PROCEDURE — G0152 HHCP-SERV OF OT,EA 15 MIN: HCPCS

## 2019-11-13 PROCEDURE — 3331090002 HH PPS REVENUE DEBIT

## 2019-11-13 PROCEDURE — A6223 GAUZE >16<=48 NO W/SAL W/O B: HCPCS

## 2019-11-14 ENCOUNTER — HOME CARE VISIT (OUTPATIENT)
Dept: SCHEDULING | Facility: HOME HEALTH | Age: 54
End: 2019-11-14
Payer: MEDICARE

## 2019-11-14 VITALS
OXYGEN SATURATION: 97 % | SYSTOLIC BLOOD PRESSURE: 160 MMHG | HEART RATE: 71 BPM | DIASTOLIC BLOOD PRESSURE: 92 MMHG | TEMPERATURE: 98.6 F

## 2019-11-14 PROCEDURE — 3331090001 HH PPS REVENUE CREDIT

## 2019-11-14 PROCEDURE — G0157 HHC PT ASSISTANT EA 15: HCPCS

## 2019-11-14 PROCEDURE — 3331090002 HH PPS REVENUE DEBIT

## 2019-11-15 ENCOUNTER — HOME CARE VISIT (OUTPATIENT)
Dept: HOME HEALTH SERVICES | Facility: HOME HEALTH | Age: 54
End: 2019-11-15
Payer: MEDICARE

## 2019-11-15 ENCOUNTER — HOME CARE VISIT (OUTPATIENT)
Dept: SCHEDULING | Facility: HOME HEALTH | Age: 54
End: 2019-11-15
Payer: MEDICARE

## 2019-11-15 VITALS
SYSTOLIC BLOOD PRESSURE: 188 MMHG | HEART RATE: 85 BPM | OXYGEN SATURATION: 100 % | DIASTOLIC BLOOD PRESSURE: 92 MMHG | TEMPERATURE: 98.4 F

## 2019-11-15 VITALS
HEART RATE: 71 BPM | OXYGEN SATURATION: 98 % | DIASTOLIC BLOOD PRESSURE: 86 MMHG | TEMPERATURE: 97.8 F | SYSTOLIC BLOOD PRESSURE: 148 MMHG

## 2019-11-15 PROCEDURE — G0157 HHC PT ASSISTANT EA 15: HCPCS

## 2019-11-15 PROCEDURE — 3331090002 HH PPS REVENUE DEBIT

## 2019-11-15 PROCEDURE — G0299 HHS/HOSPICE OF RN EA 15 MIN: HCPCS

## 2019-11-15 PROCEDURE — 3331090001 HH PPS REVENUE CREDIT

## 2019-11-16 PROCEDURE — 3331090001 HH PPS REVENUE CREDIT

## 2019-11-16 PROCEDURE — 3331090002 HH PPS REVENUE DEBIT

## 2019-11-17 PROCEDURE — 3331090002 HH PPS REVENUE DEBIT

## 2019-11-17 PROCEDURE — 3331090001 HH PPS REVENUE CREDIT

## 2019-11-18 ENCOUNTER — HOME CARE VISIT (OUTPATIENT)
Dept: SCHEDULING | Facility: HOME HEALTH | Age: 54
End: 2019-11-18
Payer: MEDICARE

## 2019-11-18 VITALS
SYSTOLIC BLOOD PRESSURE: 136 MMHG | DIASTOLIC BLOOD PRESSURE: 72 MMHG | RESPIRATION RATE: 18 BRPM | OXYGEN SATURATION: 98 % | HEART RATE: 75 BPM | TEMPERATURE: 98.4 F

## 2019-11-18 VITALS
SYSTOLIC BLOOD PRESSURE: 136 MMHG | DIASTOLIC BLOOD PRESSURE: 72 MMHG | TEMPERATURE: 98.4 F | HEART RATE: 80 BPM | OXYGEN SATURATION: 98 %

## 2019-11-18 PROCEDURE — G0299 HHS/HOSPICE OF RN EA 15 MIN: HCPCS

## 2019-11-18 PROCEDURE — G0157 HHC PT ASSISTANT EA 15: HCPCS

## 2019-11-18 PROCEDURE — 3331090002 HH PPS REVENUE DEBIT

## 2019-11-18 PROCEDURE — 3331090001 HH PPS REVENUE CREDIT

## 2019-11-19 ENCOUNTER — HOME CARE VISIT (OUTPATIENT)
Dept: SCHEDULING | Facility: HOME HEALTH | Age: 54
End: 2019-11-19
Payer: MEDICARE

## 2019-11-19 VITALS
DIASTOLIC BLOOD PRESSURE: 94 MMHG | TEMPERATURE: 97 F | OXYGEN SATURATION: 97 % | HEART RATE: 76 BPM | SYSTOLIC BLOOD PRESSURE: 162 MMHG

## 2019-11-19 VITALS
DIASTOLIC BLOOD PRESSURE: 64 MMHG | SYSTOLIC BLOOD PRESSURE: 122 MMHG | OXYGEN SATURATION: 99 % | TEMPERATURE: 98.4 F | HEART RATE: 69 BPM

## 2019-11-19 PROCEDURE — G0158 HHC OT ASSISTANT EA 15: HCPCS

## 2019-11-19 PROCEDURE — G0157 HHC PT ASSISTANT EA 15: HCPCS

## 2019-11-19 PROCEDURE — 3331090002 HH PPS REVENUE DEBIT

## 2019-11-19 PROCEDURE — 3331090001 HH PPS REVENUE CREDIT

## 2019-11-20 ENCOUNTER — HOME CARE VISIT (OUTPATIENT)
Dept: HOME HEALTH SERVICES | Facility: HOME HEALTH | Age: 54
End: 2019-11-20
Payer: MEDICARE

## 2019-11-20 PROCEDURE — 3331090002 HH PPS REVENUE DEBIT

## 2019-11-20 PROCEDURE — 3331090001 HH PPS REVENUE CREDIT

## 2019-11-21 ENCOUNTER — HOME CARE VISIT (OUTPATIENT)
Dept: SCHEDULING | Facility: HOME HEALTH | Age: 54
End: 2019-11-21
Payer: MEDICARE

## 2019-11-21 VITALS
SYSTOLIC BLOOD PRESSURE: 133 MMHG | HEART RATE: 77 BPM | TEMPERATURE: 98.1 F | OXYGEN SATURATION: 98 % | DIASTOLIC BLOOD PRESSURE: 87 MMHG

## 2019-11-21 VITALS
TEMPERATURE: 98.1 F | HEART RATE: 67 BPM | DIASTOLIC BLOOD PRESSURE: 92 MMHG | SYSTOLIC BLOOD PRESSURE: 170 MMHG | OXYGEN SATURATION: 99 %

## 2019-11-21 PROCEDURE — G0157 HHC PT ASSISTANT EA 15: HCPCS

## 2019-11-21 PROCEDURE — G0158 HHC OT ASSISTANT EA 15: HCPCS

## 2019-11-21 PROCEDURE — 3331090001 HH PPS REVENUE CREDIT

## 2019-11-21 PROCEDURE — 3331090002 HH PPS REVENUE DEBIT

## 2019-11-22 ENCOUNTER — HOME CARE VISIT (OUTPATIENT)
Dept: SCHEDULING | Facility: HOME HEALTH | Age: 54
End: 2019-11-22
Payer: MEDICARE

## 2019-11-22 VITALS
TEMPERATURE: 97.6 F | RESPIRATION RATE: 18 BRPM | SYSTOLIC BLOOD PRESSURE: 148 MMHG | OXYGEN SATURATION: 98 % | DIASTOLIC BLOOD PRESSURE: 82 MMHG | HEART RATE: 100 BPM

## 2019-11-22 PROCEDURE — 3331090001 HH PPS REVENUE CREDIT

## 2019-11-22 PROCEDURE — G0299 HHS/HOSPICE OF RN EA 15 MIN: HCPCS

## 2019-11-22 PROCEDURE — 3331090002 HH PPS REVENUE DEBIT

## 2019-11-23 PROCEDURE — 3331090001 HH PPS REVENUE CREDIT

## 2019-11-23 PROCEDURE — 3331090002 HH PPS REVENUE DEBIT

## 2019-11-24 PROCEDURE — 3331090002 HH PPS REVENUE DEBIT

## 2019-11-24 PROCEDURE — 3331090001 HH PPS REVENUE CREDIT

## 2019-11-25 ENCOUNTER — HOME CARE VISIT (OUTPATIENT)
Dept: SCHEDULING | Facility: HOME HEALTH | Age: 54
End: 2019-11-25
Payer: MEDICARE

## 2019-11-25 VITALS
DIASTOLIC BLOOD PRESSURE: 82 MMHG | OXYGEN SATURATION: 99 % | SYSTOLIC BLOOD PRESSURE: 160 MMHG | TEMPERATURE: 96.9 F | HEART RATE: 81 BPM

## 2019-11-25 VITALS
RESPIRATION RATE: 16 BRPM | HEART RATE: 72 BPM | DIASTOLIC BLOOD PRESSURE: 80 MMHG | TEMPERATURE: 96.7 F | SYSTOLIC BLOOD PRESSURE: 140 MMHG | OXYGEN SATURATION: 98 %

## 2019-11-25 VITALS
HEART RATE: 78 BPM | DIASTOLIC BLOOD PRESSURE: 104 MMHG | OXYGEN SATURATION: 99 % | SYSTOLIC BLOOD PRESSURE: 182 MMHG | TEMPERATURE: 98.4 F

## 2019-11-25 PROCEDURE — G0157 HHC PT ASSISTANT EA 15: HCPCS

## 2019-11-25 PROCEDURE — G0152 HHCP-SERV OF OT,EA 15 MIN: HCPCS

## 2019-11-25 PROCEDURE — G0299 HHS/HOSPICE OF RN EA 15 MIN: HCPCS

## 2019-11-25 PROCEDURE — 3331090001 HH PPS REVENUE CREDIT

## 2019-11-25 PROCEDURE — 3331090002 HH PPS REVENUE DEBIT

## 2019-11-26 ENCOUNTER — HOME CARE VISIT (OUTPATIENT)
Dept: SCHEDULING | Facility: HOME HEALTH | Age: 54
End: 2019-11-26
Payer: MEDICARE

## 2019-11-26 ENCOUNTER — OFFICE VISIT (OUTPATIENT)
Dept: CARDIOLOGY CLINIC | Age: 54
End: 2019-11-26

## 2019-11-26 VITALS
OXYGEN SATURATION: 98 % | HEART RATE: 78 BPM | TEMPERATURE: 97.4 F | SYSTOLIC BLOOD PRESSURE: 140 MMHG | DIASTOLIC BLOOD PRESSURE: 80 MMHG

## 2019-11-26 DIAGNOSIS — I25.83 CORONARY ARTERY DISEASE DUE TO LIPID RICH PLAQUE: Primary | ICD-10-CM

## 2019-11-26 DIAGNOSIS — E78.00 PURE HYPERCHOLESTEROLEMIA: ICD-10-CM

## 2019-11-26 DIAGNOSIS — I10 ESSENTIAL HYPERTENSION WITH GOAL BLOOD PRESSURE LESS THAN 140/90: ICD-10-CM

## 2019-11-26 DIAGNOSIS — I25.10 CORONARY ARTERY DISEASE DUE TO LIPID RICH PLAQUE: Primary | ICD-10-CM

## 2019-11-26 PROCEDURE — 3331090002 HH PPS REVENUE DEBIT

## 2019-11-26 PROCEDURE — 3331090001 HH PPS REVENUE CREDIT

## 2019-11-26 PROCEDURE — G0151 HHCP-SERV OF PT,EA 15 MIN: HCPCS

## 2019-11-26 NOTE — PROGRESS NOTES
[facility]    Ms. Joanne Mendosa is 48 y.o. female with history of coronary artery disease, coronary stent, peripheral artery disease, tobacco abuse disorder and other medical problem    Patient is here today for follow-up appointment. She has undergone left lower extremity bypass since last time. She continues to have some lower extremity pain. She has appointment with vascular team tomorrow. She denies any use of nitroglycerin since last time. She denies any chest pain or chest tightness. She denies PND. Overall she has no cardiac symptoms to report. She denies palpitation, presyncope or syncope  Denies any nausea, vomiting, abdominal pain, fever, chills, sputum production. No hematuria or other bleeding complaints    Past Medical History:   Diagnosis Date    Arthritis     Asthma     CAD (coronary artery disease)     S/P LAD 2.25 x 38 mm and 2.5 x 12 mm SYNERGY (), OM 2.25 X 18 mm BMS ()    [facility]omy[facility] [address]     Depression     Diabetes ([facility] [address]     Hypertension     [facility]peripheral [facility]) ([facility]     S/P LE angioplasty and stent, LLE bypass,     Tobacco abuse     Unspecified sleep apnea          Past Surgical History:   Procedure Laterality Date    HX  SECTION      HX HYSTERECTOMY      HX ORTHOPAEDIC      \"plate in R arm\"    HX SHOULDER ARTHROSCOPY Left        Current Outpatient Medications   Medication Sig    DULCOLAX, BISACODYL, PO Take 2 Tabs by mouth daily as needed for Other (constipation).  polyethylene glycol (MIRALAX) 17 gram packet Take 17 g by mouth daily.  vortioxetine (TRINTELLIX) 20 mg tablet Take 20 mg by mouth daily.  ergocalciferol (VITAMIN D2) 50,000 unit capsule Take 50,000 Units by mouth every seven (7) days.  carvedilol (COREG) 25 mg tablet Take 25 mg by mouth two (2) times daily (with meals).  clopidogrel (PLAVIX) 75 mg tab Take 75 mg by mouth daily.     gabapentin (NEURONTIN) 100 mg capsule Take 100 mg by mouth three (3) times daily.  ranolazine ER (RANEXA) 500 mg SR tablet Take 500 mg by mouth two (2) times a day.  traMADol (ULTRAM) 50 mg tablet Take 50 mg by mouth every six (6) hours as needed for Pain.  naloxone (NARCAN) 4 mg/actuation nasal spray Use 1 spray intranasally, then discard. Repeat with new spray every 2 min as needed for opioid overdose symptoms, alternating nostrils.  ticagrelor (BRILINTA) 90 mg tablet Take 1 Tab by mouth two (2) times a day.  nitroglycerin (NITROSTAT) 0.4 mg SL tablet 1 Tab by SubLINGual route every five (5) minutes as needed for Chest Pain. (Patient taking differently: 1 Tab by SubLINGual route every five (5) minutes as needed for Chest Pain. up to 3 doses then call 911)    losartan (COZAAR) 50 mg tablet Take 1 Tab by mouth daily.  isosorbide mononitrate ER (IMDUR) 60 mg CR tablet Take 1 Tab by mouth daily.  carvedilol (COREG) 6.25 mg tablet Take 1 Tab by mouth two (2) times daily (with meals).  atorvastatin (LIPITOR) 80 mg tablet Take 80 mg by mouth daily.  buPROPion SR (WELLBUTRIN SR) 150 mg SR tablet Take 150 mg by mouth daily.  QUEtiapine (SEROQUEL) 50 mg tablet Take 50 mg by mouth daily.  gabapentin (NEURONTIN) 300 mg capsule Take 300 mg by mouth three (3) times daily.  aspirin 81 mg chewable tablet Take 81 mg by mouth daily.  linagliptin (TRADJENTA) 5 mg PO tablet 5 mg.  clonazePAM (KLONOPIN) 1 mg tablet Take 1 mg by mouth two (2) times a day.  amLODIPine (NORVASC) 2.5 mg tablet 2.5 mg.    albuterol (PROVENTIL HFA, VENTOLIN HFA) 90 mcg/actuation inhaler Take 2 Puffs by inhalation every four (4) hours as needed for Shortness of Breath or Wheezing. No current facility-administered medications for this visit.         Allergies and Sensitivities:  No Known Allergies    Family History:  Family History   Problem Relation Age of Onset    Diabetes Mother     Hypertension Mother  Heart Disease Mother        Social History:  Social History     Tobacco Use    Smoking status: Current Every Day Smoker     Packs/day: 0.50    Smokeless tobacco: Current User   Substance Use Topics    Alcohol use: No    Drug use: No     She  reports that she has been smoking. She has been smoking about 0.50 packs per day. She uses smokeless tobacco.  She  reports no history of alcohol use. Review of Systems:  Cardiac symptoms as noted above in HPI. All others negative. Denies fatigue, malaise, skin rash, blurring vision, photophobia, neck pain, hemoptysis, chronic cough, nausea, vomiting, hematuria, burning micturition, BRBPR, chronic headaches. Physical Exam:  BP Readings from Last 3 Encounters:   11/25/19 (!) 182/104   11/25/19 160/82   11/25/19 140/80         Pulse Readings from Last 3 Encounters:   11/25/19 78   11/25/19 81   11/25/19 72          Wt Readings from Last 3 Encounters:   11/12/19 166 lb 8 oz (75.5 kg)   11/04/19 177 lb 11.1 oz (80.6 kg)   09/24/19 166 lb (75.3 kg)       Constitutional: Oriented to person, place, and time. HENT: Head: Normocephalic and atraumatic. Neck: No JVD present. Cardiovascular: Regular rhythm. No murmur, gallop or rubs appreciated  Lung: Breath sounds normal. No respiratory distress. No ronchi or rales appreciated  Abdominal: No tenderness. No rebound and no guarding. Musculoskeletal: There is no lower extremity edema. No cynosis    Review of Data  LABS:   Lab Results   Component Value Date/Time    Sodium 144 11/05/2019 03:30 AM    Potassium 3.5 11/05/2019 03:30 AM    Chloride 110 11/05/2019 03:30 AM    CO2 28 11/05/2019 03:30 AM    Glucose 94 11/05/2019 03:30 AM    BUN 12 11/05/2019 03:30 AM    Creatinine 0.70 11/05/2019 03:30 AM     Lipids Latest Ref Rng & Units 8/17/2019   Chol, Total <200 MG/   HDL 40 - 60 MG/DL 34(L)   LDL 0 - 100 MG/. 4(H)   Trig <150 MG/   Chol/HDL Ratio 0 - 5.0   5.1(H)   Some recent data might be hidden     Lab Results   Component Value Date/Time    ALT (SGPT) 21 08/06/2019 03:06 PM     Lab Results   Component Value Date/Time    Hemoglobin A1c 5.9 (H) 11/05/2019 03:30 AM       EKG    ECHO (08/19)  Left Ventricle Normal cavity size and systolic function (ejection fraction normal). Mildly to moderately increased wall thickness. The estimated ejection fraction is 56 - 60%. Visually measured ejection fraction. No regional wall motion abnormality noted. There is mild (grade 1) left ventricular diastolic dysfunction. Wall Scoring The left ventricular wall motion is normal.            Left Atrium Mildly dilated left atrium. Left Atrium volume index is 29 mL/m2. Right Ventricle Normal cavity size and global systolic function. Right Atrium Normal cavity size. Aortic Valve Trileaflet valve structure, no stenosis and no regurgitation. Mitral Valve Normal valve structure, no stenosis and no regurgitation. Tricuspid Valve Normal valve structure and no stenosis. Tricuspid regurgitation is inadequate for estimation of right ventricular systolic pressure. STRESS TEST (07/19)  · Baseline ECG: Normal sinus rhythm, non-specific ST-T wave abnormalities. · Gated SPECT: Left ventricular function post-stress was normal. Calculated ejection fraction is 59%. · Left ventricular perfusion is abnormal.  · Myocardial perfusion imaging defect 1: There is a defect that is moderate in size present in the apical to mid anterior and apex location(s) that is reversible. The defect appears to be ischemia. · Positive myocardial perfusion imaging. Myocardial perfusion imaging supports an intermediate risk stress test.    CATHETERIZATION  (08/19)  Left Main   The vessel is angiographically normal.   Left Anterior Descending   Mid and distal LAD has stent which has minimal 10-15% in-stent restenosis. Distal apical LAD has 40% narrowing after distal LAD stent. D1: 50% ostial and proximal narrowing   Ramus Intermedius   The vessel is small. Ostial and proximal 50% narrowing. Left Circumflex   Mid 40% narrowing after origin of OM1 branch. Otherwise normal OM1 branch has mid vessel eccentric 70-80% long disease. First Obtuse Marginal Branch   1st Mrg lesion 80% stenosed. . The pre-interventional distal flow is normal (LEONARDO 3). Lesion is the culprit lesion. The lesion is eccentric. The lesion was not previously treated. The stenosis was measured by a visual reading. Right Coronary Artery   Mid RCA has 40% tubular narrowing otherwise no significant obstructive disease. RPL branch has mid 60% narrowing however small vessel at this level RPDA has proximal-mid 40-50% narrowing however small vessel. Intervention     1st Mrg lesion   Angioplasty   Angioplasty was performed prior to stent deployment. The balloon used was a CATH BLLN RX MINI TREK [id number] -- . Balloon inflated using single inflation technique. Stent   A single stent was placed. Bare metal stent was successfully placed. The stent used was a STENT COR RX MINI [id number] -- MULTI-LINK MINI VISION. Angioplasty   Angioplasty using a standard balloon was performed following stent deployment. The balloon used was a CATH BLLN COR DIL [id number] -- NC TREK RAPID-EXCHANGE. Balloon inflated using single inflation technique. Post-Intervention Lesion Assessment   The intervention was successful. The guidewire crossed the lesion. Post-intervention LEONARDO flow is 3. There were no complications. There is a 0% residual stenosis post intervention. IMPRESSION & PLAN:  Patient is 48 y. o.female with coronary artery disease, coronary stent, peripheral artery disease, diabetes, hypertension and other multiple medical problem    Coronary artery disease:  Patient had anterior wall myocardial infarction in 2016 requiring to LAD stent. Cardiac stress test in July 2019, intermediate risk. Cardiac cath was performed in August 2019 and patient received OM1 stent, bare-metal stent  No angina.   No use of nitroglycerin since last time  Currently on aspirin and Brilinta. Would prefer Brilinta at least for a month, preferably for a year. How was taking Plavix as well up until 3 days ago. She has ran out of Plavix I told her she does not need to be on Plavix anymore. She will be only on aspirin and Brilinta. She understands  Continue with Coreg, Imdur, losartan, amlodipine, atorvastatin    Hypertension:  Blood pressure 121/86mmHg. Continue with current antihypertensive medications    Hyperlipidemia: This is being managed by primary care provider. Currently she is on high intensity atorvastatin 80 mg daily. Continue same    Diabetes:  Goal hemoglobin A1c less than 7 is recommended from [facility] vascular standpoint. Defer management to PCP    PAD:  Status post multiple lower extremity angioplasty and stenting. A  Defer to vascular team    Thank you for allowing me to participate in patient care. Please feel free to call me if you have any question or concern. Flory Good MD  Please note: This document has been produced using voice recognition software. Unrecognized errors in transcription may be present.

## 2019-11-27 PROCEDURE — 3331090002 HH PPS REVENUE DEBIT

## 2019-11-27 PROCEDURE — 3331090001 HH PPS REVENUE CREDIT

## 2019-11-28 PROCEDURE — 3331090002 HH PPS REVENUE DEBIT

## 2019-11-28 PROCEDURE — 3331090001 HH PPS REVENUE CREDIT

## 2019-11-29 PROCEDURE — 3331090002 HH PPS REVENUE DEBIT

## 2019-11-29 PROCEDURE — 3331090001 HH PPS REVENUE CREDIT

## 2019-11-30 PROCEDURE — 3331090002 HH PPS REVENUE DEBIT

## 2019-11-30 PROCEDURE — 3331090001 HH PPS REVENUE CREDIT

## 2019-12-01 PROCEDURE — 3331090001 HH PPS REVENUE CREDIT

## 2019-12-01 PROCEDURE — 3331090002 HH PPS REVENUE DEBIT

## 2019-12-02 PROCEDURE — 3331090001 HH PPS REVENUE CREDIT

## 2019-12-02 PROCEDURE — 3331090002 HH PPS REVENUE DEBIT

## 2019-12-03 PROCEDURE — 3331090001 HH PPS REVENUE CREDIT

## 2019-12-03 PROCEDURE — 3331090002 HH PPS REVENUE DEBIT

## 2019-12-04 PROCEDURE — 3331090002 HH PPS REVENUE DEBIT

## 2019-12-04 PROCEDURE — 3331090001 HH PPS REVENUE CREDIT

## 2019-12-05 PROCEDURE — 3331090002 HH PPS REVENUE DEBIT

## 2019-12-05 PROCEDURE — 3331090001 HH PPS REVENUE CREDIT

## 2019-12-06 PROCEDURE — 3331090002 HH PPS REVENUE DEBIT

## 2019-12-06 PROCEDURE — 3331090001 HH PPS REVENUE CREDIT

## 2019-12-07 ENCOUNTER — HOME CARE VISIT (OUTPATIENT)
Dept: SCHEDULING | Facility: HOME HEALTH | Age: 54
End: 2019-12-07
Payer: MEDICARE

## 2019-12-07 PROCEDURE — 3331090002 HH PPS REVENUE DEBIT

## 2019-12-07 PROCEDURE — 3331090001 HH PPS REVENUE CREDIT

## 2019-12-08 PROCEDURE — 3331090002 HH PPS REVENUE DEBIT

## 2019-12-08 PROCEDURE — 3331090001 HH PPS REVENUE CREDIT

## 2019-12-09 PROCEDURE — 3331090002 HH PPS REVENUE DEBIT

## 2019-12-09 PROCEDURE — 3331090001 HH PPS REVENUE CREDIT

## 2019-12-10 PROCEDURE — 3331090001 HH PPS REVENUE CREDIT

## 2019-12-10 PROCEDURE — 3331090002 HH PPS REVENUE DEBIT

## 2019-12-11 PROCEDURE — 3331090001 HH PPS REVENUE CREDIT

## 2019-12-11 PROCEDURE — 3331090002 HH PPS REVENUE DEBIT

## 2019-12-12 PROCEDURE — 3331090001 HH PPS REVENUE CREDIT

## 2019-12-12 PROCEDURE — 3331090002 HH PPS REVENUE DEBIT

## 2019-12-13 PROCEDURE — 3331090002 HH PPS REVENUE DEBIT

## 2019-12-13 PROCEDURE — 3331090001 HH PPS REVENUE CREDIT

## 2019-12-14 ENCOUNTER — HOME CARE VISIT (OUTPATIENT)
Dept: SCHEDULING | Facility: HOME HEALTH | Age: 54
End: 2019-12-14
Payer: MEDICARE

## 2019-12-14 PROCEDURE — 3331090002 HH PPS REVENUE DEBIT

## 2019-12-14 PROCEDURE — 3331090001 HH PPS REVENUE CREDIT

## 2019-12-14 PROCEDURE — G0299 HHS/HOSPICE OF RN EA 15 MIN: HCPCS

## 2019-12-15 PROCEDURE — 3331090001 HH PPS REVENUE CREDIT

## 2019-12-15 PROCEDURE — 3331090002 HH PPS REVENUE DEBIT

## 2019-12-16 PROCEDURE — 3331090001 HH PPS REVENUE CREDIT

## 2019-12-16 PROCEDURE — 3331090002 HH PPS REVENUE DEBIT

## 2019-12-17 VITALS
SYSTOLIC BLOOD PRESSURE: 147 MMHG | HEART RATE: 88 BPM | TEMPERATURE: 98.9 F | RESPIRATION RATE: 18 BRPM | DIASTOLIC BLOOD PRESSURE: 84 MMHG | OXYGEN SATURATION: 98 %

## 2019-12-17 PROCEDURE — 3331090001 HH PPS REVENUE CREDIT

## 2019-12-17 PROCEDURE — 3331090002 HH PPS REVENUE DEBIT

## 2019-12-18 PROCEDURE — 3331090001 HH PPS REVENUE CREDIT

## 2019-12-18 PROCEDURE — 3331090002 HH PPS REVENUE DEBIT

## 2019-12-19 PROCEDURE — 3331090001 HH PPS REVENUE CREDIT

## 2019-12-19 PROCEDURE — 3331090002 HH PPS REVENUE DEBIT

## 2019-12-20 PROCEDURE — 3331090002 HH PPS REVENUE DEBIT

## 2019-12-20 PROCEDURE — 3331090001 HH PPS REVENUE CREDIT

## 2019-12-21 PROCEDURE — 3331090002 HH PPS REVENUE DEBIT

## 2019-12-21 PROCEDURE — 3331090001 HH PPS REVENUE CREDIT

## 2019-12-22 PROCEDURE — 3331090001 HH PPS REVENUE CREDIT

## 2019-12-22 PROCEDURE — 3331090002 HH PPS REVENUE DEBIT

## 2019-12-23 PROCEDURE — 3331090002 HH PPS REVENUE DEBIT

## 2019-12-23 PROCEDURE — 3331090001 HH PPS REVENUE CREDIT

## 2019-12-24 PROCEDURE — 3331090001 HH PPS REVENUE CREDIT

## 2019-12-24 PROCEDURE — 3331090002 HH PPS REVENUE DEBIT

## 2019-12-25 PROCEDURE — 3331090002 HH PPS REVENUE DEBIT

## 2019-12-25 PROCEDURE — 3331090001 HH PPS REVENUE CREDIT

## 2019-12-26 PROCEDURE — 3331090001 HH PPS REVENUE CREDIT

## 2019-12-26 PROCEDURE — 3331090002 HH PPS REVENUE DEBIT

## 2019-12-27 PROCEDURE — 3331090002 HH PPS REVENUE DEBIT

## 2019-12-27 PROCEDURE — 3331090001 HH PPS REVENUE CREDIT

## 2019-12-28 PROCEDURE — 3331090001 HH PPS REVENUE CREDIT

## 2019-12-28 PROCEDURE — 3331090002 HH PPS REVENUE DEBIT

## 2019-12-29 PROCEDURE — 3331090002 HH PPS REVENUE DEBIT

## 2019-12-29 PROCEDURE — 3331090001 HH PPS REVENUE CREDIT

## 2019-12-30 PROCEDURE — 3331090002 HH PPS REVENUE DEBIT

## 2019-12-30 PROCEDURE — 3331090001 HH PPS REVENUE CREDIT

## 2019-12-31 PROCEDURE — 3331090001 HH PPS REVENUE CREDIT

## 2019-12-31 PROCEDURE — 3331090002 HH PPS REVENUE DEBIT

## 2020-01-01 PROCEDURE — 3331090002 HH PPS REVENUE DEBIT

## 2020-01-01 PROCEDURE — 3331090001 HH PPS REVENUE CREDIT

## 2020-01-02 ENCOUNTER — HOME CARE VISIT (OUTPATIENT)
Dept: HOME HEALTH SERVICES | Facility: HOME HEALTH | Age: 55
End: 2020-01-02
Payer: MEDICARE

## 2020-01-02 PROCEDURE — 3331090001 HH PPS REVENUE CREDIT

## 2020-01-02 PROCEDURE — 3331090002 HH PPS REVENUE DEBIT

## 2020-01-02 PROCEDURE — 3331090003 HH PPS REVENUE ADJ

## 2020-01-03 PROCEDURE — 3331090001 HH PPS REVENUE CREDIT

## 2020-01-03 PROCEDURE — 3331090002 HH PPS REVENUE DEBIT

## 2020-01-04 PROCEDURE — 3331090002 HH PPS REVENUE DEBIT

## 2020-01-04 PROCEDURE — 3331090001 HH PPS REVENUE CREDIT

## 2020-01-05 PROCEDURE — 3331090002 HH PPS REVENUE DEBIT

## 2020-01-05 PROCEDURE — 3331090001 HH PPS REVENUE CREDIT

## 2020-01-06 ENCOUNTER — HOME CARE VISIT (OUTPATIENT)
Dept: HOME HEALTH SERVICES | Facility: HOME HEALTH | Age: 55
End: 2020-01-06
Payer: MEDICARE

## 2020-01-15 ENCOUNTER — TELEPHONE (OUTPATIENT)
Dept: CARDIOLOGY CLINIC | Age: 55
End: 2020-01-15

## 2020-01-15 NOTE — TELEPHONE ENCOUNTER
Patient is scheduled for colonoscopy on 1/21/2020 - GI office called to get clearance to see if patient can hold Brilinta for 5 days prior     Advised I will check with Dr. Judy Kaufman for recommendation and call her back at [phone]

## 2020-06-24 ENCOUNTER — OFFICE VISIT (OUTPATIENT)
Dept: CARDIOLOGY CLINIC | Age: 55
End: 2020-06-24

## 2020-06-24 VITALS
DIASTOLIC BLOOD PRESSURE: 79 MMHG | HEIGHT: 65 IN | HEART RATE: 77 BPM | SYSTOLIC BLOOD PRESSURE: 128 MMHG | WEIGHT: 172 LBS | OXYGEN SATURATION: 98 % | BODY MASS INDEX: 28.66 KG/M2 | TEMPERATURE: 98.4 F

## 2020-06-24 DIAGNOSIS — I25.10 CORONARY ARTERY DISEASE DUE TO LIPID RICH PLAQUE: ICD-10-CM

## 2020-06-24 DIAGNOSIS — I73.9 PVD (PERIPHERAL VASCULAR DISEASE) (HCC): Primary | ICD-10-CM

## 2020-06-24 DIAGNOSIS — I25.83 CORONARY ARTERY DISEASE DUE TO LIPID RICH PLAQUE: ICD-10-CM

## 2020-06-24 DIAGNOSIS — E78.00 PURE HYPERCHOLESTEROLEMIA: ICD-10-CM

## 2020-06-24 DIAGNOSIS — I10 ESSENTIAL HYPERTENSION WITH GOAL BLOOD PRESSURE LESS THAN 140/90: ICD-10-CM

## 2020-06-24 NOTE — PROGRESS NOTES
[facility]    Ms. Sonya Currie is 50 y.o. female with history of coronary artery disease, coronary stent, peripheral artery disease, tobacco abuse disorder and other medical problem    Patient is here today for follow-up appointment. She denies any chest pain or chest tightness. She denies any use of nitroglycerin since last time. Overall her lower extremity discomfort after lower extremity revascularization procedure is better. She denies any PND. She denies presyncope or syncope  Denies any nausea, vomiting, abdominal pain, fever, chills, sputum production. No hematuria or other bleeding complaints    Past Medical History:   Diagnosis Date    Arthritis     Asthma     CAD (coronary artery disease)     S/P LAD 2.25 x 38 mm and 2.5 x 12 mm SYNERGY (), OM 2.25 X 18 mm BMS ()    Cardiomyopathy ([facility] [address]     Depression     Diabetes ([facility] [address]     Hypertension     PAD (peripheral artery disease) ([facility] [address]     S/P LE angioplasty and stent, LLE bypass,     Tobacco abuse     Unspecified sleep apnea          Past Surgical History:   Procedure Laterality Date    HX  SECTION      HX HYSTERECTOMY      HX ORTHOPAEDIC      \"plate in R arm\"    HX SHOULDER ARTHROSCOPY Left        Current Outpatient Medications   Medication Sig    HYDROcodone-acetaminophen (NORCO) 5-325 mg per tablet Take 1 Tab by mouth every six (6) hours as needed for Pain.  DULCOLAX, BISACODYL, PO Take 2 Tabs by mouth daily as needed for Other (constipation).  vortioxetine (TRINTELLIX) 20 mg tablet Take 20 mg by mouth daily.  ergocalciferol (VITAMIN D2) 50,000 unit capsule Take 50,000 Units by mouth every seven (7) days.  ranolazine ER (RANEXA) 500 mg SR tablet Take 500 mg by mouth two (2) times a day.  traMADol (ULTRAM) 50 mg tablet Take 50 mg by mouth every six (6) hours as needed for Pain.     naloxone (NARCAN) 4 mg/actuation nasal spray Use 1 spray intranasally, then discard. Repeat with new spray every 2 min as needed for opioid overdose symptoms, alternating nostrils.  ticagrelor (BRILINTA) 90 mg tablet Take 1 Tab by mouth two (2) times a day.  nitroglycerin (NITROSTAT) 0.4 mg SL tablet 1 Tab by SubLINGual route every five (5) minutes as needed for Chest Pain. (Patient taking differently: 1 Tab by SubLINGual route every five (5) minutes as needed for Chest Pain. up to 3 doses then call 911)    losartan (COZAAR) 50 mg tablet Take 1 Tab by mouth daily.  isosorbide mononitrate ER (IMDUR) 60 mg CR tablet Take 1 Tab by mouth daily.  carvedilol (COREG) 6.25 mg tablet Take 1 Tab by mouth two (2) times daily (with meals).  buPROPion SR (WELLBUTRIN SR) 150 mg SR tablet Take 150 mg by mouth daily.  QUEtiapine (SEROQUEL) 50 mg tablet Take 50 mg by mouth daily.  aspirin 81 mg chewable tablet Take 81 mg by mouth daily.  linagliptin (TRADJENTA) 5 mg PO tablet 5 mg.  amLODIPine (NORVASC) 2.5 mg tablet 2.5 mg.    albuterol (PROVENTIL HFA, VENTOLIN HFA) 90 mcg/actuation inhaler Take 2 Puffs by inhalation every four (4) hours as needed for Shortness of Breath or Wheezing. No current facility-administered medications for this visit. Allergies and Sensitivities:  No Known Allergies    Family History:  Family History   Problem Relation Age of Onset    Diabetes Mother     Hypertension Mother     Heart Disease Mother        Social History:  Social History     Tobacco Use    Smoking status: Current Every Day Smoker     Packs/day: 0.50    Smokeless tobacco: Current User   Substance Use Topics    Alcohol use: No    Drug use: No     She  reports that she has been smoking. She has been smoking about 0.50 packs per day. She uses smokeless tobacco.  She  reports no history of alcohol use. Review of Systems:  Cardiac symptoms as noted above in HPI. All others negative.   Denies fatigue, malaise, skin rash, blurring vision, photophobia, neck pain, hemoptysis, chronic cough, nausea, vomiting, hematuria, burning micturition, BRBPR, chronic headaches. Physical Exam:  BP Readings from Last 3 Encounters:   06/24/20 128/79   12/14/19 147/84   12/02/19 168/88         Pulse Readings from Last 3 Encounters:   06/24/20 77   12/14/19 88   12/02/19 71          Wt Readings from Last 3 Encounters:   06/24/20 172 lb (78 kg)   12/02/19 161 lb (73 kg)   11/12/19 166 lb 8 oz (75.5 kg)       Constitutional: Oriented to person, place, and time. HENT: Head: Normocephalic and atraumatic. Neck: No JVD present. Cardiovascular: Regular rhythm. No murmur, gallop or rubs appreciated  Lung: Breath sounds normal. No respiratory distress. No ronchi or rales appreciated  Abdominal: No tenderness. No rebound and no guarding. Musculoskeletal: There is trace lower extremity edema. No cynosis    Review of Data  LABS:   Lab Results   Component Value Date/Time    Sodium 144 11/05/2019 03:30 AM    Potassium 3.5 11/05/2019 03:30 AM    Chloride 110 11/05/2019 03:30 AM    CO2 28 11/05/2019 03:30 AM    Glucose 94 11/05/2019 03:30 AM    BUN 12 11/05/2019 03:30 AM    Creatinine 0.70 11/05/2019 03:30 AM     Lipids Latest Ref Rng & Units 8/17/2019   Chol, Total <200 MG/   HDL 40 - 60 MG/DL 34(L)   LDL 0 - 100 MG/. 4(H)   Trig <150 MG/   Chol/HDL Ratio 0 - 5.0   5.1(H)   Some recent data might be hidden     Lab Results   Component Value Date/Time    ALT (SGPT) 21 08/06/2019 03:06 PM     Lab Results   Component Value Date/Time    Hemoglobin A1c 5.9 (H) 11/05/2019 03:30 AM       EKG    ECHO (08/19)  Left Ventricle Normal cavity size and systolic function (ejection fraction normal). Mildly to moderately increased wall thickness. The estimated ejection fraction is 56 - 60%. Visually measured ejection fraction. No regional wall motion abnormality noted. There is mild (grade 1) left ventricular diastolic dysfunction.    Wall Scoring The left ventricular wall motion is normal. Left Atrium Mildly dilated left atrium. Left Atrium volume index is 29 mL/m2. Right Ventricle Normal cavity size and global systolic function. Right Atrium Normal cavity size. Aortic Valve Trileaflet valve structure, no stenosis and no regurgitation. Mitral Valve Normal valve structure, no stenosis and no regurgitation. Tricuspid Valve Normal valve structure and no stenosis. Tricuspid regurgitation is inadequate for estimation of right ventricular systolic pressure. STRESS TEST (07/19)  · Baseline ECG: Normal sinus rhythm, non-specific ST-T wave abnormalities. · Gated SPECT: Left ventricular function post-stress was normal. Calculated ejection fraction is 59%. · Left ventricular perfusion is abnormal.  · Myocardial perfusion imaging defect 1: There is a defect that is moderate in size present in the apical to mid anterior and apex location(s) that is reversible. The defect appears to be ischemia. · Positive myocardial perfusion imaging. Myocardial perfusion imaging supports an intermediate risk stress test.    CATHETERIZATION  (08/19)  Left Main   The vessel is angiographically normal.   Left Anterior Descending   Mid and distal LAD has stent which has minimal 10-15% in-stent restenosis. Distal apical LAD has 40% narrowing after distal LAD stent. D1: 50% ostial and proximal narrowing   Ramus Intermedius   The vessel is small. Ostial and proximal 50% narrowing. Left Circumflex   Mid 40% narrowing after origin of OM1 branch. Otherwise normal OM1 branch has mid vessel eccentric 70-80% long disease. First Obtuse Marginal Branch   1st Mrg lesion 80% stenosed. . The pre-interventional distal flow is normal (LEONARDO 3). Lesion is the culprit lesion. The lesion is eccentric. The lesion was not previously treated. The stenosis was measured by a visual reading. Right Coronary Artery   Mid RCA has 40% tubular narrowing otherwise no significant obstructive disease.  RPL branch has mid 60% narrowing however small vessel at this level RPDA has proximalmid 40-50% narrowing however small vessel. Intervention     1st Mrg lesion   Angioplasty   Angioplasty was performed prior to stent deployment. The balloon used was a CATH BLLN RX MINI TREK 2X15MM -- . Balloon inflated using single inflation technique. Stent   A single stent was placed. Bare metal stent was successfully placed. The stent used was a STENT COR RX MINI [id number] -- MULTI-LINK MINI VISION. Angioplasty   Angioplasty using a standard balloon was performed following stent deployment. The balloon used was a CATH BLLN COR DIL [id number] -- NC TREK RAPID-EXCHANGE. Balloon inflated using single inflation technique. Post-Intervention Lesion Assessment   The intervention was successful. The guidewire crossed the lesion. Post-intervention LEONARDO flow is 3. There were no complications. There is a 0% residual stenosis post intervention. IMPRESSION & PLAN:  Patient is 50 y. o.female with coronary artery disease, coronary stent, peripheral artery disease, diabetes, hypertension and other multiple medical problem    Coronary artery disease:  Patient had anterior wall myocardial infarction in 2016 requiring to LAD stent. Cardiac stress test in July 2019, intermediate risk. Cardiac cath was performed in August 2019 and patient received OM1 stent, bare-metal stent  No angina. No use of nitroglycerin since last time  Currently on aspirin and Brilinta. We will reduce prolonged to 60 mg twice daily maintenance dose  Continue with Coreg, Imdur, losartan, amlodipine, atorvastatin and Ranexa    Hypertension:  Blood pressure is normal..  Continue with current antihypertensive medications    Hyperlipidemia: This is being managed by primary care provider. Currently she is on high intensity atorvastatin 80 mg daily. Continue same    Diabetes:  Goal hemoglobin A1c less than 7 is recommended from [facility] vascular standpoint.   Defer management to PCP    PAD:  Status post multiple lower extremity angioplasty and stenting. Defer to vascular team    Thank you for allowing me to participate in patient care. Please feel free to call me if you have any question or concern. Salima Horton MD  Please note: This document has been produced using voice recognition software. Unrecognized errors in transcription may be present.

## 2020-06-24 NOTE — PROGRESS NOTES
Molly Mainphilippe presents today for   Chief Complaint   Patient presents with    Follow-up       Molly Gama preferred language for health care discussion is english/other. Is someone accompanying this pt? no    Is the patient using any DME equipment during OV? no    Depression Screening:  completed    Learning Assessment:  No flowsheet data found. Abuse Screening:  completed    Fall Risk  completed    Pt currently taking Anticoagulant therapy? no    Coordination of Care:  1. Have you been to the ER, urgent care clinic since your last visit? Hospitalized since your last visit? no    2. Have you seen or consulted any other health care providers outside of the [address] [place] [address] since your last visit? Include any pap smears or colon screening.  no

## 2020-07-27 RX ORDER — CARVEDILOL 6.25 MG/1
TABLET ORAL
Qty: 60 TAB | Refills: 0 | Status: SHIPPED | OUTPATIENT
Start: 2020-07-27 | End: 2020-09-23

## 2020-08-25 NOTE — ED NOTES
I have reviewed discharge instructions with the patient. The patient verbalized understanding. Patient armband removed and shredded PROBLEM DIAGNOSES  Problem: Neoplasm of uncertain behavior of skin  Assessment and Plan: Pt scheduled for excision scalp mass on 8/28/2020.  labs done results pending, ekg done  Preop covid instructions given  Preop teaching done, pt able to verbalize understanding.   meds day of surgery;  pepcid, levothyroxine

## 2020-09-23 RX ORDER — CARVEDILOL 6.25 MG/1
TABLET ORAL
Qty: 60 TAB | Refills: 0 | Status: SHIPPED | OUTPATIENT
Start: 2020-09-23 | End: 2020-10-26

## 2020-10-12 RX ORDER — ISOSORBIDE MONONITRATE 60 MG/1
TABLET, EXTENDED RELEASE ORAL
Qty: 30 TAB | Refills: 0 | Status: SHIPPED | OUTPATIENT
Start: 2020-10-12 | End: 2022-03-11

## 2020-10-26 RX ORDER — CARVEDILOL 6.25 MG/1
TABLET ORAL
Qty: 60 TAB | Refills: 0 | Status: SHIPPED | OUTPATIENT
Start: 2020-10-26 | End: 2020-11-27

## 2020-11-27 RX ORDER — CARVEDILOL 6.25 MG/1
TABLET ORAL
Qty: 60 TAB | Refills: 0 | Status: SHIPPED | OUTPATIENT
Start: 2020-11-27 | End: 2020-12-30

## 2020-12-30 RX ORDER — CARVEDILOL 6.25 MG/1
TABLET ORAL
Qty: 60 TAB | Refills: 0 | Status: SHIPPED | OUTPATIENT
Start: 2020-12-30 | End: 2021-02-11

## 2021-01-01 NOTE — Clinical Note
Baby Lavinia Bartlett      Procedure: 2021 5:08 PM    Procedure: PICC Line    A percutaneous central line was needed for long-term administration of total parenteral nutrition and lipids. The risks and benefits of the procedure were explained to the family. Their questions were answered. A time out was performed. The line was cut down to a length of 22 cm. After the infant was adequately positioned and the insertion site prepared and draped in the usual fashion, a soft catheter was inserted into the L Basilic and L cephalic  via a small gauge butterfly needle under strict sterile conditions. The catheter would not advanced, the butterfly needle was removed. There was minimal blood loss, no complications occurred and the infant tolerated the procedure well.      Electronically signed by BUNNY Guan CNP on 2021 at 5:08 PM Diagnostic wire inserted.

## 2021-01-26 NOTE — ANESTHESIA POSTPROCEDURE EVALUATION
LOWER 1 22 18 IT IS 4 - HAS VARIED IN PAST - TO FOLLOW. Procedure(s):  left common femoral artery to upper anterior tibial artery bypass with cryovein. general    Anesthesia Post Evaluation      Multimodal analgesia: multimodal analgesia used between 6 hours prior to anesthesia start to PACU discharge  Patient location during evaluation: bedside  Patient participation: complete - patient participated  Level of consciousness: awake  Pain management: adequate  Airway patency: patent  Anesthetic complications: no  Cardiovascular status: stable  Respiratory status: acceptable  Hydration status: acceptable  Post anesthesia nausea and vomiting:  controlled      Vitals Value Taken Time   /67 11/4/2019  1:11 PM   Temp 36.3 °C (97.4 °F) 11/4/2019  1:11 PM   Pulse 71 11/4/2019  1:20 PM   Resp 8 11/4/2019  1:20 PM   SpO2 94 % 11/4/2019  1:20 PM   Vitals shown include unvalidated device data.

## 2021-02-11 RX ORDER — CARVEDILOL 6.25 MG/1
TABLET ORAL
Qty: 60 TAB | Refills: 0 | Status: SHIPPED | OUTPATIENT
Start: 2021-02-11 | End: 2021-03-16

## 2021-02-25 ENCOUNTER — OFFICE VISIT (OUTPATIENT)
Dept: CARDIOLOGY CLINIC | Age: 56
End: 2021-02-25
Payer: MEDICARE

## 2021-02-25 VITALS
SYSTOLIC BLOOD PRESSURE: 125 MMHG | RESPIRATION RATE: 16 BRPM | HEIGHT: 65 IN | TEMPERATURE: 97.5 F | BODY MASS INDEX: 28.42 KG/M2 | WEIGHT: 170.6 LBS | DIASTOLIC BLOOD PRESSURE: 71 MMHG | HEART RATE: 69 BPM | OXYGEN SATURATION: 100 %

## 2021-02-25 DIAGNOSIS — I10 ESSENTIAL HYPERTENSION WITH GOAL BLOOD PRESSURE LESS THAN 140/90: ICD-10-CM

## 2021-02-25 DIAGNOSIS — I25.83 CORONARY ARTERY DISEASE DUE TO LIPID RICH PLAQUE: ICD-10-CM

## 2021-02-25 DIAGNOSIS — I73.9 PVD (PERIPHERAL VASCULAR DISEASE) (HCC): ICD-10-CM

## 2021-02-25 DIAGNOSIS — Z01.818 PRE-OP EVALUATION: Primary | ICD-10-CM

## 2021-02-25 DIAGNOSIS — I25.10 CORONARY ARTERY DISEASE DUE TO LIPID RICH PLAQUE: ICD-10-CM

## 2021-02-25 PROCEDURE — G8754 DIAS BP LESS 90: HCPCS | Performed by: INTERNAL MEDICINE

## 2021-02-25 PROCEDURE — 3017F COLORECTAL CA SCREEN DOC REV: CPT | Performed by: INTERNAL MEDICINE

## 2021-02-25 PROCEDURE — G8428 CUR MEDS NOT DOCUMENT: HCPCS | Performed by: INTERNAL MEDICINE

## 2021-02-25 PROCEDURE — G8419 CALC BMI OUT NRM PARAM NOF/U: HCPCS | Performed by: INTERNAL MEDICINE

## 2021-02-25 PROCEDURE — G8752 SYS BP LESS 140: HCPCS | Performed by: INTERNAL MEDICINE

## 2021-02-25 PROCEDURE — 99214 OFFICE O/P EST MOD 30 MIN: CPT | Performed by: INTERNAL MEDICINE

## 2021-02-25 PROCEDURE — G9717 DOC PT DX DEP/BP F/U NT REQ: HCPCS | Performed by: INTERNAL MEDICINE

## 2021-02-25 NOTE — PROGRESS NOTES
[facility]    Ms. Claudy Bennett is 50 y.o. female with history of coronary artery disease, coronary stent, peripheral artery disease, tobacco abuse disorder and other medical problem    Patient is here today for follow-up appointment. She denies any chest pain or chest tightness. She denies any use of nitroglycerin since last time. Overall her lower extremity discomfort after lower extremity revascularization procedure is better but not resolved. She is anticipating lower extremity bypass surgery. She denies any PND. She denies presyncope or syncope  Denies any use of nitroglycerin  Denies any nausea, vomiting, abdominal pain, fever, chills, sputum production. No hematuria or other bleeding complaints    Past Medical History:   Diagnosis Date    Arthritis     Asthma     CAD (coronary artery disease)     S/P LAD 2.25 x 38 mm and 2.5 x 12 mm SYNERGY (), OM 2.25 X 18 mm BMS ()    [facility]     Depression     Diabetes [facility] [facility]     Hypertension     PAD (peripheral artery disease) ([facility] [address] [facility]     S/P LE angioplasty and stent, LLE bypass,     Tobacco abuse     Unspecified sleep apnea          Past Surgical History:   Procedure Laterality Date    HX  SECTION      HX HYSTERECTOMY      HX ORTHOPAEDIC      \"plate in R arm\"    HX SHOULDER ARTHROSCOPY Left        Current Outpatient Medications   Medication Sig    carvediloL (COREG) 6.25 mg tablet TAKE 1 TABLET BY MOUTH TWICE DAILY WITH MEALS    isosorbide mononitrate ER (IMDUR) 60 mg CR tablet Take 1 tablet by mouth once daily    ticagrelor (BRILINTA) 60 mg tab tablet Take 1 Tab by mouth two (2) times a day.  HYDROcodone-acetaminophen (NORCO) 5-325 mg per tablet Take 1 Tab by mouth every six (6) hours as needed for Pain.  DULCOLAX, BISACODYL, PO Take 2 Tabs by mouth daily as needed for Other (constipation).     vortioxetine (TRINTELLIX) 20 mg tablet Take 20 mg by mouth daily.  ergocalciferol (VITAMIN D2) 50,000 unit capsule Take 50,000 Units by mouth every seven (7) days.  ranolazine ER (RANEXA) 500 mg SR tablet Take 500 mg by mouth two (2) times a day.  traMADol (ULTRAM) 50 mg tablet Take 50 mg by mouth every six (6) hours as needed for Pain.  naloxone (NARCAN) 4 mg/actuation nasal spray Use 1 spray intranasally, then discard. Repeat with new spray every 2 min as needed for opioid overdose symptoms, alternating nostrils.  nitroglycerin (NITROSTAT) 0.4 mg SL tablet 1 Tab by SubLINGual route every five (5) minutes as needed for Chest Pain. (Patient taking differently: 1 Tab by SubLINGual route every five (5) minutes as needed for Chest Pain. up to 3 doses then call 911)    losartan (COZAAR) 50 mg tablet Take 1 Tab by mouth daily.  buPROPion SR (WELLBUTRIN SR) 150 mg SR tablet Take 150 mg by mouth daily.  QUEtiapine (SEROQUEL) 50 mg tablet Take 50 mg by mouth daily.  aspirin 81 mg chewable tablet Take 81 mg by mouth daily.  linagliptin (TRADJENTA) 5 mg PO tablet 5 mg.  amLODIPine (NORVASC) 2.5 mg tablet 2.5 mg.    albuterol (PROVENTIL HFA, VENTOLIN HFA) 90 mcg/actuation inhaler Take 2 Puffs by inhalation every four (4) hours as needed for Shortness of Breath or Wheezing. No current facility-administered medications for this visit. Allergies and Sensitivities:  No Known Allergies    Family History:  Family History   Problem Relation Age of Onset    Diabetes Mother     Hypertension Mother     Heart Disease Mother        Social History:  Social History     Tobacco Use    Smoking status: Current Every Day Smoker     Packs/day: 0.50    Smokeless tobacco: Current User   Substance Use Topics    Alcohol use: No    Drug use: No     She  reports that she has been smoking. She has been smoking about 0.50 packs per day. She uses smokeless tobacco.  She  reports no history of alcohol use.     Review of Systems:  Cardiac symptoms as noted above in HPI. All others negative. Physical Exam:  BP Readings from Last 3 Encounters:   02/25/21 125/71   06/24/20 128/79   12/14/19 147/84         Pulse Readings from Last 3 Encounters:   02/25/21 69   06/24/20 77   12/14/19 88          Wt Readings from Last 3 Encounters:   02/25/21 170 lb 9.6 oz (77.4 kg)   06/24/20 172 lb (78 kg)   12/02/19 161 lb (73 kg)       Constitutional: Oriented to person, place, and time. HENT: Head: Normocephalic and atraumatic. Neck: No JVD present. Cardiovascular: Regular rhythm. No murmur, gallop or rubs appreciated  Lung: Breath sounds normal. No respiratory distress. No ronchi or rales appreciated  Abdominal: No tenderness. No rebound and no guarding. Musculoskeletal: There is trace lower extremity edema. No cynosis    Review of Data  LABS:   Lab Results   Component Value Date/Time    Sodium 144 11/05/2019 03:30 AM    Potassium 3.5 11/05/2019 03:30 AM    Chloride 110 11/05/2019 03:30 AM    CO2 28 11/05/2019 03:30 AM    Glucose 94 11/05/2019 03:30 AM    BUN 12 11/05/2019 03:30 AM    Creatinine 0.70 11/05/2019 03:30 AM     Lipids Latest Ref Rng & Units 8/17/2019   Chol, Total <200 MG/   HDL 40 - 60 MG/DL 34(L)   LDL 0 - 100 MG/. 4(H)   Trig <150 MG/   Chol/HDL Ratio 0 - 5.0   5.1(H)   Some recent data might be hidden     Lab Results   Component Value Date/Time    ALT (SGPT) 21 08/06/2019 03:06 PM     Lab Results   Component Value Date/Time    Hemoglobin A1c 5.9 (H) 11/05/2019 03:30 AM       EKG    ECHO (08/19)  Left Ventricle Normal cavity size and systolic function (ejection fraction normal). Mildly to moderately increased wall thickness. The estimated ejection fraction is 56 - 60%. Visually measured ejection fraction. No regional wall motion abnormality noted. There is mild (grade 1) left ventricular diastolic dysfunction. Wall Scoring The left ventricular wall motion is normal.            Left Atrium Mildly dilated left atrium.  Left Atrium volume index is 29 mL/m2. Right Ventricle Normal cavity size and global systolic function. Right Atrium Normal cavity size. Aortic Valve Trileaflet valve structure, no stenosis and no regurgitation. Mitral Valve Normal valve structure, no stenosis and no regurgitation. Tricuspid Valve Normal valve structure and no stenosis. Tricuspid regurgitation is inadequate for estimation of right ventricular systolic pressure. STRESS TEST (07/19)  · Baseline ECG: Normal sinus rhythm, non-specific ST-T wave abnormalities. · Gated SPECT: Left ventricular function post-stress was normal. Calculated ejection fraction is 59%. · Left ventricular perfusion is abnormal.  · Myocardial perfusion imaging defect 1: There is a defect that is moderate in size present in the apical to mid anterior and apex location(s) that is reversible. The defect appears to be ischemia. · Positive myocardial perfusion imaging. Myocardial perfusion imaging supports an intermediate risk stress test.    CATHETERIZATION  (08/19)  Left Main   The vessel is angiographically normal.   Left Anterior Descending   Mid and distal LAD has stent which has minimal 10-15% in-stent restenosis. Distal apical LAD has 40% narrowing after distal LAD stent. D1: 50% ostial and proximal narrowing   Ramus Intermedius   The vessel is small. Ostial and proximal 50% narrowing. Left Circumflex   Mid 40% narrowing after origin of OM1 branch. Otherwise normal OM1 branch has mid vessel eccentric 70-80% long disease. First Obtuse Marginal Branch   1st Mrg lesion 80% stenosed. . The pre-interventional distal flow is normal (LEONARDO 3). Lesion is the culprit lesion. The lesion is eccentric. The lesion was not previously treated. The stenosis was measured by a visual reading. Right Coronary Artery   Mid RCA has 40% tubular narrowing otherwise no significant obstructive disease.  RPL branch has mid 60% narrowing however small vessel at this level RPDA has proximalmid 40-50% narrowing however small vessel. Intervention     1st Mrg lesion   Angioplasty   Angioplasty was performed prior to stent deployment. The balloon used was a CATH BLLN RX MINI TREK 2X15MM -- . Balloon inflated using single inflation technique. Stent   A single stent was placed. Bare metal stent was successfully placed. The stent used was a STENT COR RX MINI [id number] -- MULTI-LINK MINI VISION. Angioplasty   Angioplasty using a standard balloon was performed following stent deployment. The balloon used was a CATH BLLN COR DIL [id number] -- NC TREK RAPID-EXCHANGE. Balloon inflated using single inflation technique. Post-Intervention Lesion Assessment   The intervention was successful. The guidewire crossed the lesion. Post-intervention LEONARDO flow is 3. There were no complications. There is a 0% residual stenosis post intervention. IMPRESSION & PLAN:  Patient is 50 y. o.female with coronary artery disease, coronary stent, peripheral artery disease, diabetes, hypertension and other multiple medical problem    Coronary artery disease:  Patient had anterior wall myocardial infarction in 2016 requiring to LAD stent. Cardiac stress test in July 2019, intermediate risk. Cardiac cath was performed in August 2019 and patient received OM1 stent, bare-metal stent  No angina. No use of nitroglycerin since last time  Currently on aspirin and Brilinta. Continue with Coreg, Imdur, losartan, amlodipine, atorvastatin and Ranexa    Hypertension:  Blood pressure is normal.. /70 1 mmHg continue with current antihypertensive medications    Hyperlipidemia: This is being managed by primary care provider. Currently she is on high intensity atorvastatin 80 mg daily. Continue same    Diabetes:  Goal hemoglobin A1c less than 7 is recommended from cardia vascular standpoint. Defer management to PCP    PAD and preoperative evaluation:  Status post multiple lower extremity angioplasty and stenting.     Patient is being considered for lower extremity bypass surgery. Currently patient does not appear to have decompensated heart failure or unstable coronary symptoms. She denies any use of nitroglycerin. She is on appropriate cardiac medications  She would be at intermediate risk from cardiovascular standpoint to undergo lower extremity bypass surgery  I recommend to continue cardiac medication perioperatively as tolerated. This was discussed with the patient    Thank you for allowing me to participate in patient care. Please feel free to call me if you have any question or concern. Estuardo Garcia MD  Please note: This document has been produced using voice recognition software. Unrecognized errors in transcription may be present.

## 2021-02-25 NOTE — PROGRESS NOTES
Ubaldo July presents today for   Chief Complaint   Patient presents with    Surgical Clearance       Ubaldo July preferred language for health care discussion is english/other. Personal Protective Equipment:   Personal Protective Equipment was used including: mask-surgical and hands-gloves. Patient was placed on no precaution(s). Patient was masked. Is someone accompanying this pt? No    Is the patient using any DME equipment during OV? No    Depression Screening:  No flowsheet data found. Learning Assessment:  No flowsheet data found. Abuse Screening:  Abuse Screening Questionnaire 6/24/2020   Do you ever feel afraid of your partner? N   Are you in a relationship with someone who physically or mentally threatens you? N   Is it safe for you to go home? Y       Fall Risk  Fall Risk Assessment, last 12 mths 6/24/2020   Able to walk? Yes   Fall in past 12 months? No       Pt currently taking Anticoagulant therapy? No    Coordination of Care:  1. Have you been to the ER, urgent care clinic since your last visit? Hospitalized since your last visit? No    2. Have you seen or consulted any other health care providers outside of the [address] [place] [address] since your last visit? Include any pap smears or colon screening.  No

## 2021-02-25 NOTE — LETTER
2/25/2021 Patient: Carmita Pang YOB: 1965 Date of Visit: 2/25/2021 Jane Mccoy MD 
[address] [place] [address] [phone] Via Fax: [phone] Dear Jane Mccoy MD, Thank you for referring Ms. Uriah Pham to [facility] AT [address] [place] [address] for evaluation. My notes for this consultation are attached. If you have questions, please do not hesitate to call me. I look forward to following your patient along with you. Sincerely, Lori Barrera MD

## 2021-03-02 ENCOUNTER — HOSPITAL ENCOUNTER (OUTPATIENT)
Dept: LAB | Age: 56
Discharge: HOME | End: 2021-03-02

## 2021-03-02 LAB — XX-LABCORP SPECIMEN COL,LCBCF: NORMAL

## 2021-03-02 PROCEDURE — 99001 SPECIMEN HANDLING PT-LAB: CPT

## 2021-03-16 RX ORDER — CARVEDILOL 6.25 MG/1
TABLET ORAL
Qty: 60 TAB | Refills: 0 | Status: SHIPPED | OUTPATIENT
Start: 2021-03-16 | End: 2021-04-15

## 2021-04-15 RX ORDER — CARVEDILOL 6.25 MG/1
TABLET ORAL
Qty: 60 TAB | Refills: 0 | Status: SHIPPED | OUTPATIENT
Start: 2021-04-15 | End: 2022-03-11

## 2021-05-20 PROBLEM — I70.90 ATHEROSCLEROSIS: Status: ACTIVE | Noted: 2021-05-20

## 2021-07-20 PROBLEM — I73.9 PAD (PERIPHERAL ARTERY DISEASE) (HCC): Status: ACTIVE | Noted: 2021-07-20

## 2021-07-20 PROBLEM — T82.898A VASCULAR GRAFT OCCLUSION (HCC): Status: ACTIVE | Noted: 2021-07-20

## 2021-07-20 PROBLEM — I99.8 ISCHEMIC LEG: Status: ACTIVE | Noted: 2021-07-20

## 2021-08-03 PROBLEM — I73.9 PVD (PERIPHERAL VASCULAR DISEASE) (HCC): Status: RESOLVED | Noted: 2019-11-04 | Resolved: 2021-08-03

## 2022-03-18 PROBLEM — D64.9 ANEMIA: Status: ACTIVE | Noted: 2018-12-22

## 2022-03-18 PROBLEM — E87.6 HYPOKALEMIA: Status: ACTIVE | Noted: 2018-12-20

## 2022-03-18 PROBLEM — T82.898A VASCULAR GRAFT OCCLUSION (HCC): Status: ACTIVE | Noted: 2021-07-20

## 2022-03-19 PROBLEM — I70.219 ATHEROSCLEROTIC PVD WITH INTERMITTENT CLAUDICATION (HCC): Status: ACTIVE | Noted: 2018-12-20

## 2022-03-19 PROBLEM — E11.9 DM (DIABETES MELLITUS) (HCC): Status: ACTIVE | Noted: 2018-12-20

## 2022-03-19 PROBLEM — F32.A DEPRESSION: Status: ACTIVE | Noted: 2018-12-20

## 2022-03-19 PROBLEM — I99.8 ISCHEMIC LEG: Status: ACTIVE | Noted: 2021-07-20

## 2022-03-19 PROBLEM — F17.200 CURRENT SMOKER: Status: ACTIVE | Noted: 2018-12-20

## 2022-03-19 PROBLEM — I73.9 PAD (PERIPHERAL ARTERY DISEASE) (HCC): Status: ACTIVE | Noted: 2021-07-20

## 2022-03-19 PROBLEM — I25.10 CAD (CORONARY ARTERY DISEASE): Status: ACTIVE | Noted: 2018-12-20

## 2022-03-19 PROBLEM — Z98.890 STATUS POST LEFT HEART CATHETERIZATION (LHC): Status: ACTIVE | Noted: 2019-08-16

## 2022-03-19 PROBLEM — I70.90 ATHEROSCLEROSIS: Status: ACTIVE | Noted: 2021-05-20

## 2022-03-20 PROBLEM — I10 HTN (HYPERTENSION): Status: ACTIVE | Noted: 2018-12-20

## 2022-05-02 PROBLEM — L03.116 CELLULITIS OF LEFT LEG WITHOUT FOOT: Status: ACTIVE | Noted: 2022-05-02

## 2022-05-02 PROBLEM — Z89.512 HX OF BKA, LEFT (HCC): Status: ACTIVE | Noted: 2022-05-02

## 2022-05-02 PROBLEM — T81.30XA WOUND DEHISCENCE: Status: ACTIVE | Noted: 2022-05-02

## 2022-05-02 PROBLEM — M86.9 OSTEOMYELITIS (HCC): Status: ACTIVE | Noted: 2022-05-02

## 2022-05-04 ENCOUNTER — TELEPHONE (OUTPATIENT)
Dept: CARDIOLOGY CLINIC | Age: 57
End: 2022-05-04

## 2022-05-04 NOTE — TELEPHONE ENCOUNTER
Beverly lugo [facility] needs clarification on her meds before she can be discharged. Has been on brilinta and coreg. Hospital says she needs to be on a blood thinner.  Recently had a leg amputation

## 2022-05-04 NOTE — TELEPHONE ENCOUNTER
Contacted pt at cell number. Two patient Identifiers confirmed. Advised pt pt our records showed she was taking brilinta and aspirin 81 mg daily but the hospitalist would reach out to the provider if clarification was needed. Pt verbalized understanding.

## 2022-07-12 ENCOUNTER — OFFICE VISIT (OUTPATIENT)
Dept: CARDIOLOGY CLINIC | Age: 57
End: 2022-07-12
Payer: MEDICARE

## 2022-07-12 VITALS
TEMPERATURE: 98.1 F | WEIGHT: 167 LBS | OXYGEN SATURATION: 97 % | SYSTOLIC BLOOD PRESSURE: 143 MMHG | BODY MASS INDEX: 27.79 KG/M2 | HEART RATE: 68 BPM | DIASTOLIC BLOOD PRESSURE: 77 MMHG

## 2022-07-12 DIAGNOSIS — I25.83 CORONARY ARTERY DISEASE DUE TO LIPID RICH PLAQUE: Primary | ICD-10-CM

## 2022-07-12 DIAGNOSIS — I25.10 CORONARY ARTERY DISEASE DUE TO LIPID RICH PLAQUE: Primary | ICD-10-CM

## 2022-07-12 DIAGNOSIS — I10 ESSENTIAL HYPERTENSION WITH GOAL BLOOD PRESSURE LESS THAN 140/90: ICD-10-CM

## 2022-07-12 PROCEDURE — G8753 SYS BP > OR = 140: HCPCS | Performed by: INTERNAL MEDICINE

## 2022-07-12 PROCEDURE — 99214 OFFICE O/P EST MOD 30 MIN: CPT | Performed by: INTERNAL MEDICINE

## 2022-07-12 PROCEDURE — G8419 CALC BMI OUT NRM PARAM NOF/U: HCPCS | Performed by: INTERNAL MEDICINE

## 2022-07-12 PROCEDURE — 3017F COLORECTAL CA SCREEN DOC REV: CPT | Performed by: INTERNAL MEDICINE

## 2022-07-12 PROCEDURE — G8428 CUR MEDS NOT DOCUMENT: HCPCS | Performed by: INTERNAL MEDICINE

## 2022-07-12 PROCEDURE — G8754 DIAS BP LESS 90: HCPCS | Performed by: INTERNAL MEDICINE

## 2022-07-12 PROCEDURE — G9717 DOC PT DX DEP/BP F/U NT REQ: HCPCS | Performed by: INTERNAL MEDICINE

## 2022-07-12 RX ORDER — CARVEDILOL 12.5 MG/1
25 TABLET ORAL 2 TIMES DAILY
COMMUNITY
Start: 2022-06-23

## 2022-07-12 RX ORDER — GUAIFENESIN 100 MG/5ML
81 LIQUID (ML) ORAL DAILY
COMMUNITY

## 2022-07-12 NOTE — PATIENT INSTRUCTIONS
Testing   Echo (limited)  **call office 3-5 days after testing is completed for results**     Other Testing  US carotid-scheduling [phone]

## 2022-07-12 NOTE — PROGRESS NOTES
[facility]    Ms. Manuel Coker is 56 y.o. female with history of coronary artery disease, coronary stent, peripheral artery disease, tobacco abuse disorder and other medical problem    Patient is here today for follow-up appointment for her CAD, hypertension, PAD  She denies any chest pain or chest tightness. She denies any use of nitroglycerin since last time. Continues to have lower extremity pain. 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 Denies any use of nitroglycerin  She occasionally has dyspnea on moderate exertion. Feels like this is relatively new. Denies any nausea, vomiting, abdominal pain, fever, chills, sputum production. No hematuria or other bleeding complaints    Past Medical History:   Diagnosis Date    Arthritis     Asthma     Atheroscler of native artery of left leg with intermit claudication (HCC)     Atherosclerosis     left leg    CAD (coronary artery disease)     S/P LAD 2.25 x 38 mm and 2.5 x 12 mm SYNERGY (), OM 2.25 X 18 mm BMS ()    Cardiomy[facility] [address]     Depression     Diabetes ([facility] [address]     Heart attack ([facility] [address] 2018    Hypertension     Migraine     PAD (peripheral artery disease) (HCC)     S/P LE angioplasty and stent, LLE bypass,     SOB (shortness of breath)     [facility]     TIA in / no deficits    Tobacco abuse     Unspecified sleep apnea     no cpap         Past Surgical History:   Procedure Laterality Date    HX  SECTION      HX HYSTERECTOMY      HX ORTHOPAEDIC      \"plate in R arm\"    HX SHOULDER ARTHROSCOPY Left     VASCULAR SURGERY PROCEDURE UNLIST      LE angioplasty and stent, LLE bypass       Current Outpatient Medications   Medication Sig    carvediloL (COREG) 12.5 mg tablet Take 25 mg by mouth two (2) times a day.  aspirin 81 mg chewable tablet Take 81 mg by mouth daily.     losartan (COZAAR) 100 mg tablet Take 100 mg by mouth daily.  atorvastatin (LIPITOR) 80 mg tablet Take 60 mg by mouth daily. night     amLODIPine (NORVASC) 2.5 mg tablet Take 2.5 mg by mouth daily.  ergocalciferol (VITAMIN D2) 50,000 unit capsule Take 50,000 Units by mouth every seven (7) days. sundays    linagliptin (TRADJENTA) 5 mg PO tablet Take 5 mg by mouth daily.  albuterol (PROVENTIL HFA, VENTOLIN HFA) 90 mcg/actuation inhaler Take 2 Puffs by inhalation every four (4) hours as needed for Shortness of Breath or Wheezing. No current facility-administered medications for this visit. Allergies and Sensitivities:  Allergies   Allergen Reactions    Mustard Hives    Onion Hives       Family History:  Family History   Problem Relation Age of Onset    Diabetes Mother     Hypertension Mother     Heart Disease Mother        Social History:  Social History     Tobacco Use    Smoking status: Former Smoker     Packs/day: 0.50     Years: 38.00     Pack years: 19.00     Quit date: 2021     Years since quittin.2    Smokeless tobacco: Current User   Substance Use Topics    Alcohol use: No    Drug use: No     She  reports that she quit smoking about 15 months ago. She has a 19.00 pack-year smoking history. She uses smokeless tobacco.  She  reports no history of alcohol use. Review of Systems:  Cardiac symptoms as noted above in HPI. All others negative. Physical Exam:  BP Readings from Last 3 Encounters:   22 (!) 143/77   22 (!) 163/72   22 (!) 149/69         Pulse Readings from Last 3 Encounters:   22 68   22 84   22 91          Wt Readings from Last 3 Encounters:   22 75.8 kg (167 lb)   22 82.1 kg (181 lb)   22 87.5 kg (192 lb 14.4 oz)       Constitutional: Oriented to person, place, and time. HENT: Head: Normocephalic and atraumatic. Neck: No JVD present. Cardiovascular: Regular rhythm.   Faint aortic systolic murmur appreciated  Lung: Breath sounds normal. No respiratory distress. No ronchi or rales appreciated  Abdominal: No tenderness. No rebound and no guarding. Musculoskeletal: There is trace lower extremity edema. No cynosis    Review of Data  LABS:   Lab Results   Component Value Date/Time    Sodium 136 05/04/2022 01:56 AM    Potassium 3.6 05/04/2022 01:56 AM    Chloride 102 05/04/2022 01:56 AM    CO2 25 05/04/2022 01:56 AM    Glucose 108 (H) 05/04/2022 01:56 AM    BUN 17 05/04/2022 01:56 AM    Creatinine 0.68 05/05/2022 01:58 AM     Lipids Latest Ref Rng & Units 8/17/2019   Chol, Total <200 MG/   HDL 40 - 60 MG/DL 34(L)   LDL 0 - 100 MG/. 4(H)   Trig <150 MG/   Chol/HDL Ratio 0 - 5.0   5.1(H)   Some recent data might be hidden     Lab Results   Component Value Date/Time    ALT (SGPT) 13 05/03/2022 07:00 AM     Lab Results   Component Value Date/Time    Hemoglobin A1c 6.3 (H) 05/22/2021 04:00 AM       EKG    ECHO (08/19)  Left Ventricle Normal cavity size and systolic function (ejection fraction normal). Mildly to moderately increased wall thickness. The estimated ejection fraction is 56 - 60%. Visually measured ejection fraction. No regional wall motion abnormality noted. There is mild (grade 1) left ventricular diastolic dysfunction. Wall Scoring The left ventricular wall motion is normal.            Left Atrium Mildly dilated left atrium. Left Atrium volume index is 29 mL/m2. Right Ventricle Normal cavity size and global systolic function. Right Atrium Normal cavity size. Aortic Valve Trileaflet valve structure, no stenosis and no regurgitation. Mitral Valve Normal valve structure, no stenosis and no regurgitation. Tricuspid Valve Normal valve structure and no stenosis. Tricuspid regurgitation is inadequate for estimation of right ventricular systolic pressure. STRESS TEST (07/19)  · Baseline ECG: Normal sinus rhythm, non-specific ST-T wave abnormalities.   · Gated SPECT: Left ventricular function post-stress was normal. Calculated ejection fraction is 59%. · Left ventricular perfusion is abnormal.  · Myocardial perfusion imaging defect 1: There is a defect that is moderate in size present in the apical to mid anterior and apex location(s) that is reversible. The defect appears to be ischemia. · Positive myocardial perfusion imaging. Myocardial perfusion imaging supports an intermediate risk stress test.    CATHETERIZATION  (08/19)  Left Main   The vessel is angiographically normal.   Left Anterior Descending   Mid and distal LAD has stent which has minimal 10-15% in-stent restenosis. Distal apical LAD has 40% narrowing after distal LAD stent. D1: 50% ostial and proximal narrowing   Ramus Intermedius   The vessel is small. Ostial and proximal 50% narrowing. Left Circumflex   Mid 40% narrowing after origin of OM1 branch. Otherwise normal OM1 branch has mid vessel eccentric 70-80% long disease. First Obtuse Marginal Branch   1st Mrg lesion 80% stenosed. . The pre-interventional distal flow is normal (LEONARDO 3). Lesion is the culprit lesion. The lesion is eccentric. The lesion was not previously treated. The stenosis was measured by a visual reading. Right Coronary Artery   Mid RCA has 40% tubular narrowing otherwise no significant obstructive disease. RPL branch has mid 60% narrowing however small vessel at this level RPDA has proximal-mid 40-50% narrowing however small vessel. Intervention     1st Mrg lesion   Angioplasty   Angioplasty was performed prior to stent deployment. The balloon used was a CATH BLLN RX MINI TREK 2X15MM -- . Balloon inflated using single inflation technique. Stent   A single stent was placed. Bare metal stent was successfully placed. The stent used was a STENT COR RX MINI [id number] -- MULTI-LINK MINI VISION. Angioplasty   Angioplasty using a standard balloon was performed following stent deployment.  The balloon used was a CATH BLLN COR DIL [id number] -- NC TREK RAPID-EXCHANGE. Balloon inflated using single inflation technique. Post-Intervention Lesion Assessment   The intervention was successful. The guidewire crossed the lesion. Post-intervention LEONARDO flow is 3. There were no complications. There is a 0% residual stenosis post intervention. IMPRESSION & PLAN:  Patient is 56 y. o.female with coronary artery disease, coronary stent, peripheral artery disease, diabetes, hypertension and other multiple medical problem    Coronary artery disease:  Patient had anterior wall myocardial infarction in 2016 requiring to LAD stent. Cardiac stress test in July 2019, intermediate risk. Cardiac cath was performed in August 2019 and patient received OM1 stent, bare-metal stent  Currently on aspirin  Continue with losartan, atorvastatin, amlodipine and Coreg. Hypertension:  Blood pressure is normal.. /82 mmHg continue with current antihypertensive medications  Repeat blood pressure was 142/77. Salt restriction advised  Will order echo to rule out hypertensive cardiovascular heart disease or LV dysfunction because of dyspnea. Hyperlipidemia: This is being managed by primary care provider. Currently she is on high intensity atorvastatin 80 mg daily. Continue same    Diabetes:  Goal hemoglobin A1c less than 7 is recommended from cardia vascular standpoint. Defer management to PCP    PAD   Status post multiple lower extremity angioplasty and stenting and LLE bypass in 2021. Eventually had to go to LLE BKA in 03/2022    -Randall potential left carotid bruit on exam.  Will order ultrasound of carotid artery to rule out any carotid stenosis. Thank you for allowing me to participate in patient care. Please feel free to call me if you have any question or concern. Merle Baca MD  Please note: This document has been produced using voice recognition software. Unrecognized errors in transcription may be present.

## 2022-07-12 NOTE — PROGRESS NOTES
Identified pt with two pt identifiers(name and ). Reviewed record in preparation for visit and have obtained necessary documentation. Alexis Melton presents today for hospital f/u  Chief Complaint   Patient presents with   [facility] Follow Up       Pt c/o Barrera Klein preferred language for health care discussion is english/other. Personal Protective Equipment:   Personal Protective Equipment was used including: mask-surgical and hands-gloves. Patient was placed on no precaution(s). Patient was masked. Precautions:   Patient currently on None  Patient currently roomed with door closed. Is someone accompanying this pt? Yes, Shamar Christina (mother)    Is the patient using any DME equipment during OV? Yes, wheelchair    Depression Screening:  3 most recent PHQ Screens 2022   Little interest or pleasure in doing things Not at all   Feeling down, depressed, irritable, or hopeless Nearly every day   Total Score PHQ 2 3   Trouble falling or staying asleep, or sleeping too much Nearly every day   Feeling tired or having little energy Nearly every day   Poor appetite, weight loss, or overeating Nearly every day   Feeling bad about yourself - or that you are a failure or have let yourself or your family down Nearly every day   Trouble concentrating on things such as school, work, reading, or watching TV Not at all   Moving or speaking so slowly that other people could have noticed; or the opposite being so fidgety that others notice Several days   Thoughts of being better off dead, or hurting yourself in some way Not at all   PHQ 9 Score 16   How difficult have these problems made it for you to do your work, take care of your home and get along with others Extremely difficult       Learning Assessment:  No flowsheet data found. Abuse Screening:  Abuse Screening Questionnaire 2020   Do you ever feel afraid of your partner?  N   Are you in a relationship with someone who physically or mentally threatens you? N   Is it safe for you to go home? Y       Fall Risk  Fall Risk Assessment, last 12 mths 6/24/2020   Able to walk? Yes   Fall in past 12 months? No         Coordination of Care:  1. Have you been to the ER, urgent care clinic since your last visit? Hospitalized since your last visit? Yes, [facility] 4/23/22    2. Have you seen or consulted any other health care providers outside of the [address] [place] [address] since your last visit? Include any pap smears or colon screening. no      Please see Red banners under Allergies and Med Rec to remove outside inquires. All correct information has been verified with patient and added to chart.      Medication's patient's would liked removed has been marked not taking to be removed per Verbal order and read back per Ramírez Lincoln MD

## 2022-07-13 ENCOUNTER — TELEPHONE (OUTPATIENT)
Dept: CARDIOLOGY CLINIC | Age: 57
End: 2022-07-13

## 2022-07-13 NOTE — TELEPHONE ENCOUNTER
Patient scheduled to have carotid duplex on 07/19. Authorization dept says it needs to go to peer to peer.      [phone]  Case Number: [id number]    Needs to be done by July 22nd or risk that it wont be able to be done for another 60 days    Marnie: [phone]

## 2022-07-14 NOTE — TELEPHONE ENCOUNTER
Attempted to contact scheduling dept at number, no answer. Lvm on secure line per Dr Rudy Jean-Baptiste advised to call office if further clarification was needed.

## 2022-07-14 NOTE — TELEPHONE ENCOUNTER
Contacted pt at [facility] number. Two patient Identifiers confirmed. Advised pt per Dr Jazmin Mabry. Pt verbalized understanding.

## 2022-07-28 ENCOUNTER — HOSPITAL ENCOUNTER (OUTPATIENT)
Dept: VASCULAR SURGERY | Age: 57
Discharge: HOME | End: 2022-07-28
Attending: INTERNAL MEDICINE
Payer: MEDICARE

## 2022-07-28 DIAGNOSIS — I10 ESSENTIAL HYPERTENSION WITH GOAL BLOOD PRESSURE LESS THAN 140/90: ICD-10-CM

## 2022-07-28 DIAGNOSIS — I25.83 CORONARY ARTERY DISEASE DUE TO LIPID RICH PLAQUE: ICD-10-CM

## 2022-07-28 DIAGNOSIS — I25.10 CORONARY ARTERY DISEASE DUE TO LIPID RICH PLAQUE: ICD-10-CM

## 2022-07-28 LAB
LEFT CCA DIST DIAS: 17.2 CM/S
LEFT CCA DIST SYS: 78.7 CM/S
LEFT CCA MID DIAS: 20.44 CM/S
LEFT CCA MID SYS: 79.8 CM/S
LEFT CCA PROX DIAS: 18.3 CM/S
LEFT CCA PROX SYS: 83.1 CM/S
LEFT ECA DIAS: 21.31 CM/S
LEFT ECA SYS: 185.7 CM/S
LEFT ICA DIST DIAS: 38.1 CM/S
LEFT ICA DIST SYS: 121.1 CM/S
LEFT ICA MID DIAS: 41.2 CM/S
LEFT ICA MID SYS: 143 CM/S
LEFT ICA PROX DIAS: 19.6 CM/S
LEFT ICA PROX SYS: 65.2 CM/S
LEFT ICA/CCA SYS: 1.82 NO UNITS
LEFT VERTEBRAL DIAS: 18.35 CM/S
LEFT VERTEBRAL SYS: 54.3 CM/S
RIGHT CCA DIST DIAS: 12.7 CM/S
RIGHT CCA DIST SYS: 55.5 CM/S
RIGHT CCA MID DIAS: 18.84 CM/S
RIGHT CCA MID SYS: 71.19 CM/S
RIGHT CCA PROX DIAS: 18 CM/S
RIGHT CCA PROX SYS: 72.1 CM/S
RIGHT ECA DIAS: 24.24 CM/S
RIGHT ECA SYS: 160.2 CM/S
RIGHT ICA DIST DIAS: 39.7 CM/S
RIGHT ICA DIST SYS: 106 CM/S
RIGHT ICA MID DIAS: 41.4 CM/S
RIGHT ICA MID SYS: 151.2 CM/S
RIGHT ICA PROX DIAS: 21.5 CM/S
RIGHT ICA PROX SYS: 66 CM/S
RIGHT ICA/CCA SYS: 2.7 NO UNITS
RIGHT VERTEBRAL DIAS: 14.62 CM/S
RIGHT VERTEBRAL SYS: 65.2 CM/S
VAS LEFT SUBCLAVIAN PROX EDV: 0 CM/S
VAS LEFT SUBCLAVIAN PROX PSV: 77.4 CM/S
VAS RIGHT SUBCLAVIAN PROX EDV: 0 CM/S
VAS RIGHT SUBCLAVIAN PROX PSV: 73.5 CM/S

## 2022-07-28 PROCEDURE — 93880 EXTRACRANIAL BILAT STUDY: CPT

## 2022-08-01 ENCOUNTER — TELEPHONE (OUTPATIENT)
Dept: CARDIOLOGY CLINIC | Age: 57
End: 2022-08-01

## 2022-08-01 NOTE — TELEPHONE ENCOUNTER
Verbal order and read back per Destiney Sharpe MD  DUPLEX CAROTID BILATERAL    She needs to be seen by vascular and primary team regarding this. Thanks       Result Text   · Moderate heterogenous plaque at the bifurcations and internal carotid arteries bilaterally, with 50-69% stenosis. · Significant stenosis in the external carotid arteries. · Antegrade vertebral arteries bilaterally. · Normal velocities in the the subclavian arteries bilaterally.

## 2022-08-02 NOTE — TELEPHONE ENCOUNTER
Contacted pt a  number. Two patient Identifiers confirmed. Advised pt per Dr Feng Staff. Pt verbalized understanding.

## 2022-08-30 ENCOUNTER — TELEPHONE (OUTPATIENT)
Dept: CARDIOLOGY CLINIC | Age: 57
End: 2022-08-30

## 2023-01-12 ENCOUNTER — OFFICE VISIT (OUTPATIENT)
Dept: CARDIOLOGY CLINIC | Age: 58
End: 2023-01-12

## 2023-02-14 ENCOUNTER — OFFICE VISIT (OUTPATIENT)
Age: 58
End: 2023-02-14
Payer: MEDICARE

## 2023-02-14 VITALS
BODY MASS INDEX: 29.12 KG/M2 | OXYGEN SATURATION: 99 % | HEART RATE: 98 BPM | WEIGHT: 175 LBS | DIASTOLIC BLOOD PRESSURE: 84 MMHG | SYSTOLIC BLOOD PRESSURE: 138 MMHG

## 2023-02-14 DIAGNOSIS — E78.00 PURE HYPERCHOLESTEROLEMIA: ICD-10-CM

## 2023-02-14 DIAGNOSIS — I25.10 ATHEROSCLEROSIS OF NATIVE CORONARY ARTERY OF NATIVE HEART WITHOUT ANGINA PECTORIS: Primary | ICD-10-CM

## 2023-02-14 DIAGNOSIS — I73.9 PERIPHERAL VASCULAR DISEASE, UNSPECIFIED (HCC): ICD-10-CM

## 2023-02-14 DIAGNOSIS — I10 ESSENTIAL HYPERTENSION WITH GOAL BLOOD PRESSURE LESS THAN 140/90: ICD-10-CM

## 2023-02-14 PROCEDURE — 99214 OFFICE O/P EST MOD 30 MIN: CPT | Performed by: INTERNAL MEDICINE

## 2023-02-14 PROCEDURE — 3075F SYST BP GE 130 - 139MM HG: CPT | Performed by: INTERNAL MEDICINE

## 2023-02-14 PROCEDURE — 3079F DIAST BP 80-89 MM HG: CPT | Performed by: INTERNAL MEDICINE

## 2023-02-14 ASSESSMENT — PATIENT HEALTH QUESTIONNAIRE - PHQ9
SUM OF ALL RESPONSES TO PHQ QUESTIONS 1-9: 0
SUM OF ALL RESPONSES TO PHQ QUESTIONS 1-9: 0
1. LITTLE INTEREST OR PLEASURE IN DOING THINGS: 0
SUM OF ALL RESPONSES TO PHQ9 QUESTIONS 1 & 2: 0
SUM OF ALL RESPONSES TO PHQ QUESTIONS 1-9: 0
SUM OF ALL RESPONSES TO PHQ QUESTIONS 1-9: 0
2. FEELING DOWN, DEPRESSED OR HOPELESS: 0

## 2023-02-14 NOTE — PATIENT INSTRUCTIONS
Call office at 4 pm to day to review meds with clinical staff    New Location Address- projected for the month of May 2023    [address], [place] [address]

## 2023-02-14 NOTE — PROGRESS NOTES
[facility]    Mayuri Ordaz is 56 y.o. female with history of coronary artery disease, coronary stent, peripheral artery disease, tobacco abuse disorder and  other medical problem      Patient is here today for follow-up appointment for her CAD, hypertension, PAD   She denies any chest pain or chest tightness. She denies any use of nitroglycerin since last time. Patient is using lower extremity prosthetic limb on the left side depending on the pain situation. Denies any use of nitroglycerin. No chest pain or chest tightness. Denies any nausea, vomiting, abdominal pain, fever, chills, sputum production. No hematuria or other bleeding complaints    Past Medical History:   Diagnosis Date    Arthritis     Asthma     Atheroscler of native artery of left leg with intermit claudication ([facility] [address]     Atherosclerosis     left leg    CAD (coronary artery disease)     S/P LAD 2.25 x 38 mm and 2.5 x 12 mm SYNERGY (2016), OM 2.25 X 18 mm BMS (08/19)    Cardiomyopathy ([facility] [address]     Depression     Diabetes ([facility] [address]     Heart attack ([facility] [address] 2018    Hypertension     Migraine     PAD (peripheral artery disease) ([facility])     S/P LE angioplasty and stent, LLE bypass,     SOB (shortness of breath)     Stroke ([facility])     TIA in 2011/ no deficits    Tobacco abuse     Unspecified sleep apnea     no cpap       Review of Systems:  Cardiac symptoms as noted above in HPI. All others negative. Denies fatigue, malaise, skin rash, joint pain, blurring vision, photophobia, neck pain, hemoptysis, chronic cough, nausea, vomiting, hematuria, burning micturition, BRBPR, chronic headaches.     Current Outpatient Medications   Medication Sig    amLODIPine (NORVASC) 2.5 MG tablet Take 2.5 mg by mouth daily    aspirin 81 MG chewable tablet Take 81 mg by mouth daily    atorvastatin (LIPITOR) 80 MG tablet Take 60 mg by mouth daily    carvedilol (COREG) 12.5 MG tablet Take 25 mg by mouth 2 times daily    losartan (COZAAR) 100 MG tablet Take 100 mg by mouth daily    albuterol sulfate HFA (PROVENTIL;VENTOLIN;PROAIR) 108 (90 Base) MCG/ACT inhaler Inhale 2 puffs into the lungs every 4 hours as needed    ergocalciferol (ERGOCALCIFEROL) 1.25 MG (70712 UT) capsule Take 50,000 Units by mouth every 7 days     No current facility-administered medications for this visit. Past Surgical History:   Procedure Laterality Date     SECTION      HYSTERECTOMY (CERVIX STATUS UNKNOWN)      ORTHOPEDIC SURGERY      \"plate in R arm\"    SHOULDER ARTHROSCOPY Left     VASCULAR SURGERY      LE angioplasty and stent, LLE bypass       Allergies and Sensitivities:  Allergies   Allergen Reactions    Allyl Isothiocyanate Hives    Onion Hives       Family History:  Family History   Problem Relation Age of Onset    Hypertension Mother     Heart Disease Mother     Diabetes Mother        Social History:  Social History     Tobacco Use    Smoking status: Former     Packs/day: 0.50     Types: Cigarettes     Quit date: 2021     Years since quittin.8    Smokeless tobacco: Current   Substance Use Topics    Alcohol use: No    Drug use: No     She  reports that she quit smoking about 22 months ago. Her smoking use included cigarettes. She smoked an average of .5 packs per day. She uses smokeless tobacco.  She  reports no history of alcohol use. Physical Exam:  BP Readings from Last 3 Encounters:   23 138/84   22 (!) 143/77         Pulse Readings from Last 3 Encounters:   23 98   22 68          Wt Readings from Last 3 Encounters:   23 175 lb (79.4 kg)   22 167 lb (75.8 kg)   22 167 lb (75.8 kg)       Constitutional: Oriented to person, place, and time. HENT: Head: Normocephalic and atraumatic. Eyes: Conjunctivae and extraocular motions are normal.   Neck: No JVD present. Carotid bruit is not appreciated. Cardiovascular: Regular rhythm.    No murmur, gallop or rubs appreciated  Lung: Breath sounds normal. No respiratory distress. No ronchi or rales appreciated  Abdominal: No tenderness. No rebound and no guarding. Musculoskeletal: There is no lower extremity edema. No cynosis   Left below-knee amputation    LABS:   @  Lab Results   Component Value Date/Time    WBC 3.5 05/04/2022 01:56 AM    HGB 9.6 05/04/2022 01:56 AM    HCT 32.9 05/04/2022 01:56 AM     05/04/2022 01:56 AM    MCV 70.6 05/04/2022 01:56 AM     Lab Results   Component Value Date/Time     05/04/2022 01:56 AM    K 3.6 05/04/2022 01:56 AM     05/04/2022 01:56 AM    CO2 25 05/04/2022 01:56 AM    BUN 17 05/04/2022 01:56 AM     Lipids Latest Ref Rng & Units 5/3/2022 3/17/2022 5/21/2021 4/22/2021   ALT 12 - 78 U/L 13 34 12 20   AST 15 - 37 U/L 43(H) 136(H) 14(L) 16   Some recent data might be hidden     Lab Results   Component Value Date/Time    ALT 13 05/03/2022 07:00 AM       No results found for: TSHFT4, TSH, TSHREFLEX, YKL2KLB    ECHO     TRANSTHORACIC ECHOCARDIOGRAM (TTE) LIMITED (CONTRAST/BUBBLE/3D PRN) 09/01/2022 12:41 PM, 09/01/2022 12:00 AM (Final)      Left Ventricle: Normal left ventricular systolic function with a visually estimated EF of 55 - 60%. Left ventricle size is normal. Mildly increased wall thickness. Normal wall motion. Right Ventricle: Right ventricle size is normal. Normal systolic function. TAPSE is normal.    Aortic Valve: No regurgitation. No stenosis. Mitral Valve: Findings consistent with myxomatous degeneration. Mildly thickened leaflet, at the anterior leaflet. Mild regurgitation. No stenosis noted. Tricuspid Valve: Trace regurgitation. No stenosis noted. STRESS TEST (07/19)    Baseline ECG: Normal sinus rhythm, non-specific ST-T wave abnormalities. Gated SPECT: Left ventricular function post-stress was normal. Calculated ejection fraction is 59%.     Left ventricular perfusion is abnormal.    Myocardial perfusion imaging defect 1: There is a defect that is moderate in size present in the apical to mid anterior and apex location(s) that is reversible. The defect appears to be ischemia. Positive myocardial perfusion imaging. Myocardial perfusion imaging supports an intermediate risk stress test.      CATHETERIZATION  (08/19)       Left Main     The vessel is angiographically normal.     Left Anterior Descending     Mid and distal LAD has stent which has minimal 10-15% in-stent restenosis. Distal apical LAD has 40% narrowing after distal LAD stent. D1: 50% ostial  and proximal narrowing     Ramus Intermedius     The vessel is small. Ostial and proximal 50% narrowing. Left Circumflex     Mid 40% narrowing after origin of OM1 branch. Otherwise normal OM1 branch has mid vessel eccentric 70-80% long disease. First Obtuse Marginal Branch     1st Mrg lesion 80% stenosed. . The pre-interventional distal flow is normal (ELVIN 3). Lesion is the culprit lesion. The lesion is eccentric. The lesion  was not previously treated. The stenosis was measured by a visual reading. Right Coronary Artery     Mid RCA has 40% tubular narrowing otherwise no significant obstructive disease. RPL branch has mid 60% narrowing however small vessel at this level RPDA  has proximal-mid 40-50% narrowing however small vessel. Intervention      1st Mrg lesion     Angioplasty     Angioplasty was performed prior to stent deployment. The balloon used was a CATH BLLN RX MINI TREK 2X15MM -- . Balloon inflated using single inflation  technique. Stent     A single stent was placed. Bare metal stent was successfully placed. The stent used was a STENT COR RX MINI [id number] -- MULTI-LINK MINI VISION. Angioplasty       Angioplasty using a standard balloon was performed following stent deployment. The balloon used was a CATH BLLN COR DIL [id number] -- NC TREK RAPID-EXCHANGE. Balloon inflated using single inflation technique. Post-Intervention Lesion Assessment     The intervention was successful. The guidewire crossed the lesion. Post-intervention ELVIN flow is 3. There were no complications. There is a 0% residual stenosis post intervention. IMPRESSION & PLAN:  Arleth Stager  is 56 y.o. female with     Coronary artery disease:   Patient had anterior wall myocardial infarction in 2016 requiring to LAD stent. Cardiac stress test in July 2019, intermediate risk. Cardiac cath was performed in August 2019 and patient received OM1 stent, bare-metal stent  [id number] -- MULTI-LINK MINI VISION. Currently on aspirin. Continue with losartan, atorvastatin, amlodipine and Coreg. Denies any chest pain or chest tightness. Hypertension:   /84 mmHg continue with current antihypertensive medications     Hyperlipidemia: This is being managed by primary care provider. Currently she is on high intensity atorvastatin 80 mg daily. Continue same      Diabetes:  Goal hemoglobin A1c less than 7 is recommended from [facility] vascular standpoint. Defer management to PCP      PAD   Status post multiple lower extremity angioplasty and stenting and LLE bypass in 2021. Eventually had to go to LLE BKA in 03/2022     This plan was discussed with patient who is in agreement. Thank you for allowing me to participate in patient care. Please feel free to call me if you have any question or concern. Boby Mroan MD  Please note: This document has been produced using voice recognition software. Unrecognized errors in transcription may be present.

## 2023-02-14 NOTE — PROGRESS NOTES
Identified pt with two pt identifiers(name and ). Reviewed record in preparation for visit and have obtained necessary documentation. Dennise Hair presents today for   Chief Complaint   Patient presents with    Results     Post US and echo       Pt denies DIZZINESS, SOB, CHEST PAIN/ PRESSURE, FATIGUE/WEAKNESS, HEADACHES, SWELLING. Dennise Hair preferred language for health care discussion is english/other. Personal Protective Equipment:   Personal Protective Equipment was used including: mask-surgical and hands-gloves. Patient was placed on no precaution(s). Patient was masked. Precautions:   Patient currently on None  Patient currently roomed with door closed. Is someone accompanying this pt? no    Is the patient using any DME equipment during [address]? Yes.wheelchair     Depression Screening:  Completed         Pt currently taking Anticoagulant therapy? no  Pt currently taking Antiplatelet therapy? yes    Coordination of Care:  1. Have you been to the ER, urgent care clinic since your last visit? Hospitalized since your last visit? no    2. Have you seen or consulted any other health care providers outside of the [address] [place] [address] since your last visit? Include any pap smears or colon screening.  no

## 2023-09-20 NOTE — PROGRESS NOTES
BP Readings from Last 3 Encounters:   09/20/23 132/67   05/19/23 (!) 154/74   05/16/23 130/72     The current medical regimen is effective;  continue present plan and medications.     Problem: Discharge Planning  Goal: *Discharge to safe environment  Outcome: resloved/met    home with home health    [facility], [place] & [address] Provider list has been given to the patient and/or patient representative. Patient and/or patient representative has signed the [place] of Choice selecting [facility]as their preference agency and a copy given. Both Home Health Provider list and Freedom of Choice have been placed on the chart. Referral in [facility]. fax'd info and order to [facility] for walker    Left message for [facility] on ans service. Care Management Interventions  PCP Verified by CM: Yes  Palliative Care Criteria Met (RRAT>21 & CHF Dx)?: No  Mode of Transport at Discharge: Other (see comment)  Transition of Care Consult (CM Consult): [address]: Yes  MyChart Signup: No  Discharge Durable Medical Equipment: Yes  Physical Therapy Consult: Yes  Occupational Therapy Consult: Yes  Speech Therapy Consult: No  Current Support Network:  Other  Confirm Follow Up Transport: Family  Plan discussed with Pt/Family/Caregiver: Yes  Freedom of Choice Offered: Yes  Discharge Location  Discharge Placement: Home with home health

## (undated) DEVICE — GLIDESHEATH SLENDER STAINLESS STEEL KIT: Brand: GLIDESHEATH SLENDER

## (undated) DEVICE — SUTURE PROL SZ 5-0 L24IN NONABSORBABLE BLU L9.3MM BV-1 3/8 9702H

## (undated) DEVICE — BAND RADIAL COMPR ARTERY 24CM -- REG BX/10

## (undated) DEVICE — CLIP INT SM WIDE RED TI TRNSVRS GRV CHEVRON SHP W PRECIS

## (undated) DEVICE — 8FR FRAZIER SUCTION HANDLE: Brand: CARDINAL HEALTH

## (undated) DEVICE — SPONGE GZ W4XL4IN COT 12 PLY TYP VII WVN C FLD DSGN

## (undated) DEVICE — SUT PROL 6-0 24IN BV1 DA BLU --

## (undated) DEVICE — CATHETER ANGIO 5FR L100CM GRY S STL NYL JR4 3 SEG BRAID L

## (undated) DEVICE — Device

## (undated) DEVICE — DEVICE INFL W/ HEM VLV TORQ

## (undated) DEVICE — SUT SLK 2-0SH 30IN BLK --

## (undated) DEVICE — CATHETER ANGIO JL4 0.045 INX5 FRX100 CM THRULUMEN EXPO

## (undated) DEVICE — SLIM BODY SKIN STAPLER: Brand: APPOSE ULC

## (undated) DEVICE — PERCUTANEOUS ENTRY THINWALL NEEDLE  ONE-PART: Brand: COOK

## (undated) DEVICE — TOWEL SURG W16XL26IN BLU NONFENESTRATED DLX ST 2 PER PK

## (undated) DEVICE — SUTURE PROL SZ 7-0 L24IN NONABSORBABLE BLU BV-1 L9.3MM 3/8 8304H

## (undated) DEVICE — TAPE UMB 1/8X30IN MP COT WHT --

## (undated) DEVICE — VESSEL LOOPS,MAXI, RED: Brand: DEVON

## (undated) DEVICE — BANDAGE COMPR W4INXL5YD BGE COHESIVE SELF ADH ADBAN CBN1104] AVCOR HEALTHCARE PRODUCTS INC]

## (undated) DEVICE — VESSEL LOOPS,MAXI, BLUE: Brand: DEVON

## (undated) DEVICE — SURGIFOAM SPNG SZ 100

## (undated) DEVICE — FLOSEAL MATRIX IS INDICATED IN SURGICAL PROCEDURES (OTHER THAN IN OPHTHALMIC) AS AN ADJUNCT TO HEMOSTASIS WHEN CONTROL OF BLEEDING BY LIGATURE OR CONVENTIONALPROCEDURES IS INEFFECTIVE OR IMPRACTICAL.: Brand: FLOSEAL HEMOSTATIC MATRIX

## (undated) DEVICE — SET ANGIO L6.5IN L BOR 3 W STPCOCK SPIK TBNG

## (undated) DEVICE — 3M™ IOBAN™ 2 ANTIMICROBIAL INCISE DRAPE 6650EZ: Brand: IOBAN™ 2

## (undated) DEVICE — SUTURE VCRL SZ 3-0 L27IN ABSRB VLT L26MM SH 1/2 CIR J316H

## (undated) DEVICE — INSULATED BLADE ELECTRODE: Brand: EDGE

## (undated) DEVICE — GAUZE,SPONGE,8"X4",12PLY,XRAY,STRL,LF: Brand: MEDLINE

## (undated) DEVICE — 3M™ UNIVERSAL ELECTROSURGICAL PLATE, SPLIT, UNCORDED 9160: Brand: 3M™

## (undated) DEVICE — STAPLER SKIN H3.9MM WIRE DIA0.58MM CRWN 6.9MM 35 STPL FIX

## (undated) DEVICE — DRAIN SURG L0.75IN TRCR

## (undated) DEVICE — INTRODUCER SHTH 6FR CANN L11CM W/ MINI GWIRE UNIQUE SLIX

## (undated) DEVICE — ANGIO-SEAL STS PLUS VASCULAR CLOSURE DEVICE: Brand: ANGIO-SEAL

## (undated) DEVICE — DEPAUL MAJOR VASCULAR CDS: Brand: MEDLINE INDUSTRIES, INC.

## (undated) DEVICE — SOLUTION IV 1000ML 0.9% SOD CHL

## (undated) DEVICE — ZINACTIVE USE 2641837 CLIP LIG M BLU TI HRT SHP WIRE HORZ 600 PER BX

## (undated) DEVICE — GOWN,SIRUS,FABRNF,XL,20/CS: Brand: MEDLINE

## (undated) DEVICE — SHEATH INTRO 5FR L11CM EVAR S STL FLX AND KINK RESIST CANN

## (undated) DEVICE — NC TREK™ CORONARY DILATATION CATHETER 2.25 MM X 12 MM / RAPID-EXCHANGE: Brand: NC TREK™

## (undated) DEVICE — SUTURE MCRYL SZ 4-0 L18IN ABSRB UD L19MM PS-2 3/8 CIR PRIM Y496G

## (undated) DEVICE — SKIN MARKER,REGULAR TIP WITH RULER AND LABELS: Brand: DEVON

## (undated) DEVICE — SUTURE PERMAHAND SZ 3-0 L18IN NONABSORBABLE BLK SILK BRAID A184H

## (undated) DEVICE — RADIFOCUS OPTITORQUE ANGIOGRAPHIC CATHETER: Brand: OPTITORQUE

## (undated) DEVICE — NEEDLE COUNTER: Brand: DEROYAL

## (undated) DEVICE — SUT SLK 4-0 18IN TIE MP BLK --

## (undated) DEVICE — COPILOT BLEEDBACK CONTROL VALVE: Brand: COPILOT

## (undated) DEVICE — 3M™ TEGADERM™ TRANSPARENT FILM DRESSING FRAME STYLE, 1626W, 4 IN X 4-3/4 IN (10 CM X 12 CM), 50/CT 4CT/CASE: Brand: 3M™ TEGADERM™

## (undated) DEVICE — MINI TREK CORONARY DILATATION CATHETER 2.0 MM X 15 MM / RAPID-EXCHANGE: Brand: MINI TREK

## (undated) DEVICE — CATHETER GUID EXTRA BACKUP 3.5 0.070IN 6FR 100CM VISTA BRITE TIP

## (undated) DEVICE — SUTURE PERMAHAND SZ 2-0 L12X18IN NONABSORBABLE BLK SILK A185H

## (undated) DEVICE — X-RAY SPONGES,12 PLY: Brand: DERMACEA

## (undated) DEVICE — SUTURE ABSORBABLE BRAIDED 2-0 CT-1 27 IN UD VICRYL J259H

## (undated) DEVICE — SPONGE LAP 18X18IN STRL -- 5/PK

## (undated) DEVICE — SMALL TITANIUM CLIP

## (undated) DEVICE — SOL IRRIGATION INJ NACL 0.9% 500ML BTL

## (undated) DEVICE — STAPLER SKIN H3.9MM WIRE DIA0.58MM CRWN 6.9MM 35 STPL ROT

## (undated) DEVICE — GLOVE SURG SZ 7.5 L11.73IN FNGR THK7.5MIL STRW LTX POLYMER

## (undated) DEVICE — REM POLYHESIVE ADULT PATIENT RETURN ELECTRODE: Brand: VALLEYLAB

## (undated) DEVICE — APPLICATOR BNDG 1MM ADH PREMIERPRO EXOFIN

## (undated) DEVICE — CLIP LIG ADH PD W SLOT HORZ --

## (undated) DEVICE — INTENDED TO BE USED TO OCCLUDE, RETRACT AND IDENTIFY ARTERIES, VEINS, TENDONS AND NERVES IN SURGICAL PROCEDURES: Brand: STERION®  VESSEL LOOP

## (undated) DEVICE — KIT PROC VASC MAJ CUST LTX STR --

## (undated) DEVICE — KENDALL SCD EXPRESS SLEEVES, KNEE LENGTH, MEDIUM: Brand: KENDALL SCD

## (undated) DEVICE — (D)PREP SKN CHLRAPRP APPL 26ML -- CONVERT TO ITEM 371833

## (undated) DEVICE — SYR 50ML LR LCK 1ML GRAD NSAF --

## (undated) DEVICE — KIT CATH OD16FR 5ML BLLN SIL URIN INDWL STR TIP INF CTRL

## (undated) DEVICE — SURGICAL PROCEDURE KIT LT HRT CUST

## (undated) DEVICE — O.R TOWEL, X-RAY DETECTABLE: Brand: DEROYAL

## (undated) DEVICE — GUIDEWIRE VASC L190CM DIA0.014IN ELASTINITE NIT HYDRPHLC

## (undated) DEVICE — SUTURE VCRL SZ 3-0 L27IN ABSRB UD L26MM SH 1/2 CIR J416H

## (undated) DEVICE — HEAD CUT VEIN SLV VLVTOM 2- --

## (undated) DEVICE — SOLUTION IV 500ML 0.9% SOD CHL FLX CONT

## (undated) DEVICE — PREP SKN CHLRAPRP 26ML TNT -- CONVERT TO ITEM 373320

## (undated) DEVICE — ROCKER SWITCH PENCIL HOLSTER: Brand: VALLEYLAB

## (undated) DEVICE — COVER LT HNDL BLU PLAS

## (undated) DEVICE — PRESSURE MONITORING SET: Brand: TRUWAVE

## (undated) DEVICE — TRAY F 16F URIN M

## (undated) DEVICE — SUT VCRL + 3-0 27IN CT2 UD --